# Patient Record
Sex: FEMALE | Race: WHITE | NOT HISPANIC OR LATINO | Employment: OTHER | URBAN - METROPOLITAN AREA
[De-identification: names, ages, dates, MRNs, and addresses within clinical notes are randomized per-mention and may not be internally consistent; named-entity substitution may affect disease eponyms.]

---

## 2017-01-06 DIAGNOSIS — E11.9 TYPE 2 DIABETES MELLITUS WITHOUT COMPLICATION: ICD-10-CM

## 2017-03-08 ENCOUNTER — TELEPHONE (OUTPATIENT)
Dept: INTERNAL MEDICINE | Facility: CLINIC | Age: 82
End: 2017-03-08

## 2017-03-08 NOTE — TELEPHONE ENCOUNTER
----- Message from Swati Joshi sent at 3/8/2017  1:56 PM CST -----  Contact: Ms Bert Lama/   son  Patient is calling for a RX refill of Atorvastatin 80mg called into [facility] pharmacy at [phone]

## 2017-03-29 ENCOUNTER — TELEPHONE (OUTPATIENT)
Dept: INTERNAL MEDICINE | Facility: CLINIC | Age: 82
End: 2017-03-29

## 2017-03-29 RX ORDER — ATORVASTATIN CALCIUM 40 MG/1
40 TABLET, FILM COATED ORAL DAILY
Qty: 90 TABLET | Refills: 3 | Status: SHIPPED | OUTPATIENT
Start: 2017-03-29 | End: 2018-10-11 | Stop reason: SDUPTHER

## 2017-03-29 NOTE — TELEPHONE ENCOUNTER
----- Message from Tonia Austin sent at 3/29/2017 10:34 AM CDT -----  Contact: Bert/ son  [phone]  Pt's son called and is very upset because his mother has been out of Lipitor x 2 weeks and said that he has left three messages and also sent a text to . Pt was told to start getting Lipitor from [facility]'s [address] [phone]. Please take care of this today and notify son when this has been ordered.

## 2017-03-29 NOTE — TELEPHONE ENCOUNTER
Spoke to patients son, Tori. Wants to know why patient was taken off of Lipitor.   If this was a mistake, can we send in a refill to [facility] pharmacy, please advise. Thank you.

## 2017-06-18 RX ORDER — LOSARTAN POTASSIUM 100 MG/1
TABLET ORAL
Qty: 90 TABLET | Refills: 1 | Status: SHIPPED | OUTPATIENT
Start: 2017-06-18 | End: 2017-12-26 | Stop reason: SDUPTHER

## 2017-07-05 RX ORDER — METFORMIN HYDROCHLORIDE 500 MG/1
TABLET, EXTENDED RELEASE ORAL
Qty: 90 TABLET | Refills: 1 | Status: SHIPPED | OUTPATIENT
Start: 2017-07-05 | End: 2018-05-31 | Stop reason: SDUPTHER

## 2017-08-13 ENCOUNTER — TELEPHONE (OUTPATIENT)
Dept: INTERNAL MEDICINE | Facility: CLINIC | Age: 82
End: 2017-08-13

## 2017-08-13 RX ORDER — AMLODIPINE BESYLATE 10 MG/1
TABLET ORAL
Qty: 90 TABLET | Refills: 1 | Status: SHIPPED | OUTPATIENT
Start: 2017-08-13 | End: 2018-10-11 | Stop reason: SDUPTHER

## 2017-08-13 RX ORDER — MIRTAZAPINE 15 MG/1
TABLET, FILM COATED ORAL
Qty: 90 TABLET | Refills: 1 | Status: SHIPPED | OUTPATIENT
Start: 2017-08-13 | End: 2018-06-18 | Stop reason: SDUPTHER

## 2017-08-14 NOTE — TELEPHONE ENCOUNTER
----- Message from Tonia Austin sent at 8/14/2017  2:00 PM CDT -----  Contact: SHARON  Someone called to advise pt she should come in for a visit but she is feeling fine and doesn't want to schedule at this time.

## 2017-12-26 RX ORDER — LOSARTAN POTASSIUM 100 MG/1
TABLET ORAL
Qty: 90 TABLET | Refills: 3 | Status: SHIPPED | OUTPATIENT
Start: 2017-12-26 | End: 2018-10-11 | Stop reason: SDUPTHER

## 2017-12-26 RX ORDER — METOPROLOL SUCCINATE 50 MG/1
TABLET, EXTENDED RELEASE ORAL
Qty: 90 TABLET | Refills: 3 | Status: SHIPPED | OUTPATIENT
Start: 2017-12-26 | End: 2018-10-11 | Stop reason: SDUPTHER

## 2018-02-06 ENCOUNTER — OFFICE VISIT (OUTPATIENT)
Dept: DERMATOLOGY | Facility: CLINIC | Age: 83
End: 2018-02-06
Payer: MEDICARE

## 2018-02-06 VITALS — BODY MASS INDEX: 23.86 KG/M2 | WEIGHT: 139 LBS

## 2018-02-06 DIAGNOSIS — D48.5 NEOPLASM OF UNCERTAIN BEHAVIOR OF SKIN: Primary | ICD-10-CM

## 2018-02-06 DIAGNOSIS — Z85.828 HISTORY OF SKIN CANCER: ICD-10-CM

## 2018-02-06 DIAGNOSIS — L57.0 ACTINIC KERATOSIS: ICD-10-CM

## 2018-02-06 PROCEDURE — 99213 OFFICE O/P EST LOW 20 MIN: CPT | Mod: 25,S$PBB,, | Performed by: DERMATOLOGY

## 2018-02-06 PROCEDURE — 1159F MED LIST DOCD IN RCRD: CPT | Mod: ,,, | Performed by: DERMATOLOGY

## 2018-02-06 PROCEDURE — 17000 DESTRUCT PREMALG LESION: CPT | Mod: PBBFAC,PO | Performed by: DERMATOLOGY

## 2018-02-06 PROCEDURE — 88305 TISSUE EXAM BY PATHOLOGIST: CPT | Mod: 26,,, | Performed by: PATHOLOGY

## 2018-02-06 PROCEDURE — 88305 TISSUE EXAM BY PATHOLOGIST: CPT | Performed by: PATHOLOGY

## 2018-02-06 PROCEDURE — 17000 DESTRUCT PREMALG LESION: CPT | Mod: S$PBB,,, | Performed by: DERMATOLOGY

## 2018-02-06 PROCEDURE — 11100 PR BIOPSY OF SKIN LESION: CPT | Mod: 59,S$PBB,, | Performed by: DERMATOLOGY

## 2018-02-06 PROCEDURE — 99213 OFFICE O/P EST LOW 20 MIN: CPT | Mod: PBBFAC,PO | Performed by: DERMATOLOGY

## 2018-02-06 PROCEDURE — 11100 PR BIOPSY OF SKIN LESION: CPT | Mod: 59,PBBFAC,PO | Performed by: DERMATOLOGY

## 2018-02-06 PROCEDURE — 99999 PR PBB SHADOW E&M-EST. PATIENT-LVL III: CPT | Mod: PBBFAC,,, | Performed by: DERMATOLOGY

## 2018-02-06 PROCEDURE — 1125F AMNT PAIN NOTED PAIN PRSNT: CPT | Mod: ,,, | Performed by: DERMATOLOGY

## 2018-02-06 NOTE — PROGRESS NOTES
Subjective:       Patient ID:  Audrey Lama is a 89 y.o. female who presents for   Chief Complaint   Patient presents with    Lesion     right hand     History of Present Illness: The patient presents with chief complaint of spot.  Location: right hand  Duration: several years  Signs/Symptoms: painful, uses cane in her right hand and is sore    Prior treatments: none    See previous note 2016           Review of Systems     Objective:    Physical Exam   Constitutional: She appears well-developed and well-nourished. No distress.   Neurological: She is alert and oriented to person, place, and time. She is not disoriented.   Psychiatric: She has a normal mood and affect.   Skin:   Areas Examined (abnormalities noted in diagram):   Head / Face Inspection Performed  Neck Inspection Performed  Chest / Axilla Inspection Performed  RUE Inspected  LUE Inspection Performed  Nails and Digits Inspection Performed                   Diagram Legend     Erythematous scaling macule/papule c/w actinic keratosis       Vascular papule c/w angioma      Pigmented verrucoid papule/plaque c/w seborrheic keratosis      Yellow umbilicated papule c/w sebaceous hyperplasia      Irregularly shaped tan macule c/w lentigo     1-2 mm smooth white papules consistent with Milia      Movable subcutaneous cyst with punctum c/w epidermal inclusion cyst      Subcutaneous movable cyst c/w pilar cyst      Firm pink to brown papule c/w dermatofibroma      Pedunculated fleshy papule(s) c/w skin tag(s)      Evenly pigmented macule c/w junctional nevus     Mildly variegated pigmented, slightly irregular-bordered macule c/w mildly atypical nevus      Flesh colored to evenly pigmented papule c/w intradermal nevus       Pink pearly papule/plaque c/w basal cell carcinoma      Erythematous hyperkeratotic cursted plaque c/w SCC      Surgical scar with no sign of skin cancer recurrence      Open and closed comedones      Inflammatory papules and pustules       Verrucoid papule consistent consistent with wart     Erythematous eczematous patches and plaques     Dystrophic onycholytic nail with subungual debris c/w onychomycosis     Umbilicated papule    Erythematous-base heme-crusted tan verrucoid plaque consistent with inflamed seborrheic keratosis     Erythematous Silvery Scaling Plaque c/w Psoriasis     See annotation      Assessment / Plan:      Pathology Orders:     Normal Orders This Visit    Tissue Specimen To Pathology, Dermatology     Questions:    Directional Terms:  Other(comment)    Clinical information:  r/o scc    Specific Site:  right hand        Neoplasm of uncertain behavior of skin  -     Tissue Specimen To Pathology, Dermatology  Shave biopsy performed after verbal consent including risk of infection, scar, recurrence, need for additional treatment of site. Area prepped with alcohol, anesthetized with approximately 1.0cc of 1% lidocaine with epinephrine. . Hemostasis achieved with  hyfrecation. No complications. Dressing applied. Wound care explained. Will need further rx if + ca  See photo on chart 8/2016:      Actinic keratosi     s Cryosurgery Procedure Note    Verbal consent from the patient is obtained and the patient is aware of the precancerous quality and need for treatment of these lesions. Liquid nitrogen cryosurgery is applied to the 1 actinic keratoses, as detailed in the physical exam, to produce a freeze injury.      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

## 2018-04-11 ENCOUNTER — PROCEDURE VISIT (OUTPATIENT)
Dept: DERMATOLOGY | Facility: CLINIC | Age: 83
End: 2018-04-11
Payer: MEDICARE

## 2018-04-11 VITALS
HEART RATE: 59 BPM | DIASTOLIC BLOOD PRESSURE: 56 MMHG | HEIGHT: 64 IN | SYSTOLIC BLOOD PRESSURE: 152 MMHG | WEIGHT: 140 LBS | BODY MASS INDEX: 23.9 KG/M2

## 2018-04-11 DIAGNOSIS — C44.622 SQUAMOUS CELL CARCINOMA OF SKIN OF RIGHT HAND AND FINGERS: Primary | ICD-10-CM

## 2018-04-11 PROCEDURE — 17312 MOHS ADDL STAGE: CPT | Mod: PBBFAC | Performed by: DERMATOLOGY

## 2018-04-11 PROCEDURE — 17311 MOHS 1 STAGE H/N/HF/G: CPT | Mod: PBBFAC | Performed by: DERMATOLOGY

## 2018-04-11 PROCEDURE — 17312 MOHS ADDL STAGE: CPT | Mod: S$PBB,,, | Performed by: DERMATOLOGY

## 2018-04-11 PROCEDURE — 17311 MOHS 1 STAGE H/N/HF/G: CPT | Mod: S$PBB,,, | Performed by: DERMATOLOGY

## 2018-04-11 PROCEDURE — 13132 CMPLX RPR F/C/C/M/N/AX/G/H/F: CPT | Mod: PBBFAC | Performed by: DERMATOLOGY

## 2018-04-11 PROCEDURE — 99499 UNLISTED E&M SERVICE: CPT | Mod: S$PBB,,, | Performed by: DERMATOLOGY

## 2018-04-11 PROCEDURE — 13132 CMPLX RPR F/C/C/M/N/AX/G/H/F: CPT | Mod: 51,S$PBB,, | Performed by: DERMATOLOGY

## 2018-04-11 RX ORDER — OXYCODONE AND ACETAMINOPHEN 5; 325 MG/1; MG/1
1 TABLET ORAL
Qty: 20 TABLET | Refills: 0 | Status: SHIPPED | OUTPATIENT
Start: 2018-04-11 | End: 2018-04-30

## 2018-04-11 RX ORDER — CEPHALEXIN 500 MG/1
500 CAPSULE ORAL 3 TIMES DAILY
Qty: 30 CAPSULE | Refills: 0 | Status: SHIPPED | OUTPATIENT
Start: 2018-04-11 | End: 2018-04-21

## 2018-04-11 NOTE — PROGRESS NOTES
PROCEDURE: Mohs' Micrographic Surgery    INDICATION: Biopsy-proven skin cancer of cosmetically and functionally important areas, including head, neck, genital, hand, foot, or areas known for having difficulty in healing, such as the lower anterior legs. Tumors with aggressive clinical behavior (rapidly growing, greater than 1 cm in diameter). Tumor with ill-defined borders.    REFERRING MD: Imelda Schmidt M.D.    CASE NUMBER:     ANESTHETIC: 8 cc 0.5% Lidocaine with Epi 1:200,000 mixed 1:1 with 0.5% Bupivacaine    SURGICAL PREP: Hibiclens    SURGEON: Otis Loaiza MD    ASSISTANTS: Kierra Teran PA-C, Edilia Jain MA and Hafsa Babin, Surg Tech    PREOPERATIVE DIAGNOSIS: squamous cell carcinoma    POSTOPERATIVE DIAGNOSIS: squamous cell carcinoma    PATHOLOGIC DIAGNOSIS: squamous cell carcinoma- invasive, infiltrating, moderately differentiated; SCCIS    HISTOLOGY OF SPECIMENS IN FIRST STAGE:   Tumor Type: Tumor seen. Squamous cell carcinoma in situ: Epidermis with full-thickness atypia and variable epidermal maturation.  Invasive squamous cell carcinoma: Proliferation of squamous cells exhibiting atypia and infiltrating within the dermis.  Moderately-differentiated squamous cell carcinoma: Proliferation of squamous cells exhibiting atypia of moderate differentiation and infiltrating within the dermis.   Depth of Invasion: epidermis and dermis  Perineural Invasion: No    HISTOLOGY OF SPECIMENS IN SUBSEQUENT STAGES:  Tumor Type: Tumor seen. Same as in first stage.   Depth of Invasion: epidermis and dermis  Perineural Invasion: No    STAGES OF MOHS' SURGERY PERFORMED: 3    TUMOR-FREE PLANE ACHIEVED: Yes    HEMOSTASIS: electrocoagulation     SPECIMENS: 7 (5 in stage A, 1 in stage B and 1 in stage C)    LOCATION: right hand. Patient verified location. (Web space between thumb and index finger)    INITIAL LESION SIZE: 1.5 x 1.7 cm    FINAL DEFECT SIZE: 2.0 x 3.6 cm    WOUND REPAIR/DISPOSITION: The patient tolerated  "Mohs' Micrographic Surgery for a squamous cell carcinoma very well. When the tumor was completely removed, a repair of the surgical defect was undertaken.      PROCEDURE: Complex Linear Repair    INDICATION: Status post Mohs' Micrographic Surgery for squamous cell carcinoma.    CASE NUMBER:     SURGEON: Otis Loaiza MD    ASSISTANTS: Kierra Teran PA-C and Hafsa Babin Surg Tech    ANESTHETIC: 3 cc 1% Lidocaine with Epinephrine 1:100,000    SURGICAL PREP: Hibiclens    LOCATION: right hand    DEFECT SIZE: 2.0 x 3.6 cm    WOUND REPAIR/DISPOSITION:  After the patient's carcinoma had been completely removed with Mohs' Micrographic Surgery, a repair of the surgical defect was undertaken. The patient was returned to the operating suite where the area of right hand was prepped, draped, and anesthetized in the usual sterile fashion. The wound was widely undermined in all directions. Then, electrocoagulation was used to obtain meticulous hemostasis. 4-0 Vicryl buried vertical mattress sutures were placed into the subcutaneous and dermal plane to close the wound and armaan the cutaneous wound edge. Bilateral dog ears were identified and were removed by a standard Burow's triangle technique. The cutaneous wound edges were closed using interrupted 4-0 Prolene suture.    The patient tolerated the procedure well.    The area was cleaned and dressed appropriately and the patient was given wound care instructions, as well as appointment for follow-up evaluation. Patient was placed on Percocet 5 prn postop pain and Keflex 500 mg TID x 10 days.    LENGTH OF REPAIR: 5.5 cm    Vitals:    04/11/18 0729 04/11/18 1155   BP: 129/67 (!) 152/56   BP Location: Left arm    Patient Position: Sitting    BP Method: Medium (Automatic)    Pulse: 68 (!) 59   Weight: 63.5 kg (140 lb)    Height: 5' 4" (1.626 m)          "

## 2018-04-24 ENCOUNTER — TELEPHONE (OUTPATIENT)
Dept: DERMATOLOGY | Facility: CLINIC | Age: 83
End: 2018-04-24

## 2018-04-24 NOTE — TELEPHONE ENCOUNTER
Called pt who had surgery on R hand for SCC on 4/11 with CLC, pt's daughter called stating the area looks white with a foul odor. I called the number she left on my answering machine and I spoke to the pt who denied any pain or swelling in the area. I am assuming because the area looks white/grey with odor it is getting too moist. I advised the patient to continue wound care but leave the bandage off to let the area dry somewhat and to call if she has any issues. She expressed verbal understanding.

## 2018-04-25 ENCOUNTER — TELEPHONE (OUTPATIENT)
Dept: DERMATOLOGY | Facility: CLINIC | Age: 83
End: 2018-04-25

## 2018-04-25 NOTE — TELEPHONE ENCOUNTER
Spoke with Melina and she stated she removed the bandage and it does not look like it is infected. As I stated before when I spoke to Ms Ventura the patient to remove the bandage, clean with soap and water and keep moist with a thin layer of Aquaphor or vaseline. She expressed verbal understanding.

## 2018-04-25 NOTE — TELEPHONE ENCOUNTER
----- Message from Lisa Fuentes sent at 4/25/2018  8:21 AM CDT -----  Contact:    [phone]-Melina-please call patient friend said patient can not talk area from procedure stitches not looking good please call number in message need to speak with the nurse

## 2018-04-30 ENCOUNTER — OFFICE VISIT (OUTPATIENT)
Dept: DERMATOLOGY | Facility: CLINIC | Age: 83
End: 2018-04-30
Payer: MEDICARE

## 2018-04-30 DIAGNOSIS — Z09 POSTOP CHECK: Primary | ICD-10-CM

## 2018-04-30 PROCEDURE — 99213 OFFICE O/P EST LOW 20 MIN: CPT | Mod: PBBFAC | Performed by: DERMATOLOGY

## 2018-04-30 PROCEDURE — 99999 PR PBB SHADOW E&M-EST. PATIENT-LVL III: CPT | Mod: PBBFAC,,, | Performed by: DERMATOLOGY

## 2018-04-30 PROCEDURE — 99024 POSTOP FOLLOW-UP VISIT: CPT | Mod: POP,,, | Performed by: DERMATOLOGY

## 2018-04-30 NOTE — PROGRESS NOTES
89 y.o. female patient is here for suture removal following Mohs' surgery.    Patient reports no problems with right hand and fingers.    WOUND PE:  The right hand and fingers sutures intact. Wound healing well. Good skin edges. No signs or symptoms of infection.    IMPRESSION:  Healing operative site from Mohs' surgery SCC, right hand and fingers s/p Mohs with CLC, postop day # 19..    PLAN:  Sutures removed today. Steri-strips applied.  Continue wound care.  Keep moist with Aquaphor.    RTC:  In 3-6 months with Imelda Schmidt M.D. for skin check or sooner if new concern arises.

## 2018-05-31 RX ORDER — METFORMIN HYDROCHLORIDE 500 MG/1
500 TABLET, EXTENDED RELEASE ORAL NIGHTLY
Qty: 90 TABLET | Refills: 3 | Status: SHIPPED | OUTPATIENT
Start: 2018-05-31 | End: 2018-10-11 | Stop reason: SDUPTHER

## 2018-05-31 NOTE — TELEPHONE ENCOUNTER
----- Message from Kimberly Tai sent at 5/31/2018 12:24 PM CDT -----  Contact: Son/Bert [phone]  Is this a refill or new RX:  Refill    RX name and strength: metformin (GLUCOPHAGE-XR) 500 MG 24 hr tablet     Pharmacy name and phone # [facility] - [address] [phone] (Phone) [phone] (Fax)

## 2018-06-05 ENCOUNTER — NURSE TRIAGE (OUTPATIENT)
Dept: ADMINISTRATIVE | Facility: CLINIC | Age: 83
End: 2018-06-05

## 2018-06-06 NOTE — TELEPHONE ENCOUNTER
"  Reason for Disposition   [1] Shingles rash (matches SYMPTOMS) AND [2] weak immune system (e.g., HIV positive,  cancer chemotherapy, chronic steroid treatment, splenectomy) AND [3] NOT taking antiviral medication    Answer Assessment - Initial Assessment Questions  1. APPEARANCE of RASH: "Describe the rash."       Pinkish color with clear blisters, she has been scratching  2. LOCATION: "Where is the rash located?"       Middle of the lower back  3. ONSET: "When did the rash start?"       today  4. ITCHING: "Does the rash itch?" If so, ask: "How bad is the itch?"  (Scale 1-10; or mild, moderate, severe)      no  5. PAIN: "Does the rash hurt?" If so, ask: "How bad is the pain?"  (Scale 1-10; or mild, moderate, severe)      Burns, 5/10  6. OTHER SYMPTOMS: "Do you have any other symptoms?" (e.g., fever)      no  7. PREGNANCY: "Is there any chance you are pregnant?" "When was your last menstrual period?"      na    Protocols used: [facility]INGLES-MARTHA    "

## 2018-06-18 RX ORDER — MIRTAZAPINE 15 MG/1
TABLET, FILM COATED ORAL
Qty: 90 TABLET | Refills: 1 | Status: SHIPPED | OUTPATIENT
Start: 2018-06-18 | End: 2018-10-11 | Stop reason: SDUPTHER

## 2018-09-27 ENCOUNTER — DOCUMENTATION ONLY (OUTPATIENT)
Dept: AUDIOLOGY | Facility: CLINIC | Age: 83
End: 2018-09-27

## 2018-09-27 NOTE — PROGRESS NOTES
[employer]  Purchase date: 11/12/15     [id number]  : M2L  L/D Exp 12/12/16  Repair Exp 12/12/17

## 2018-10-04 ENCOUNTER — HOSPITAL ENCOUNTER (EMERGENCY)
Facility: HOSPITAL | Age: 83
Discharge: HOME | End: 2018-10-04
Attending: EMERGENCY MEDICINE
Payer: MEDICARE

## 2018-10-04 VITALS
DIASTOLIC BLOOD PRESSURE: 81 MMHG | TEMPERATURE: 98 F | WEIGHT: 140 LBS | SYSTOLIC BLOOD PRESSURE: 127 MMHG | HEIGHT: 64 IN | OXYGEN SATURATION: 100 % | HEART RATE: 71 BPM | RESPIRATION RATE: 16 BRPM | BODY MASS INDEX: 23.9 KG/M2

## 2018-10-04 DIAGNOSIS — S61.412A LACERATION OF LEFT HAND WITHOUT COMPLICATION, EXCLUDING FINGERS, INITIAL ENCOUNTER: ICD-10-CM

## 2018-10-04 DIAGNOSIS — M79.639 FOREARM PAIN: ICD-10-CM
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
## 2018-10-04 DIAGNOSIS — M25.539 WRIST PAIN: ICD-10-CM

## 2018-10-04 DIAGNOSIS — S50.819A FOREARM ABRASION: ICD-10-CM

## 2018-10-04 DIAGNOSIS — S52.501A CLOSED FRACTURE OF DISTAL END OF RIGHT RADIUS, UNSPECIFIED FRACTURE MORPHOLOGY, INITIAL ENCOUNTER: ICD-10-CM

## 2018-10-04 PROCEDURE — 12001 RPR S/N/AX/GEN/TRNK 2.5CM/<: CPT

## 2018-10-04 PROCEDURE — 99284 EMERGENCY DEPT VISIT MOD MDM: CPT | Mod: 25

## 2018-10-04 PROCEDURE — 25000003 PHARM REV CODE 250: Performed by: NURSE PRACTITIONER

## 2018-10-04 RX ORDER — CEPHALEXIN 500 MG/1
500 CAPSULE ORAL 4 TIMES DAILY
Qty: 28 CAPSULE | Refills: 0 | Status: SHIPPED | OUTPATIENT
Start: 2018-10-04 | End: 2018-10-11

## 2018-10-04 RX ORDER — HYDROCODONE BITARTRATE AND ACETAMINOPHEN 5; 325 MG/1; MG/1
1 TABLET ORAL
Status: COMPLETED | OUTPATIENT
Start: 2018-10-04 | End: 2018-10-04

## 2018-10-04 RX ORDER — LIDOCAINE HYDROCHLORIDE 10 MG/ML
10 INJECTION INFILTRATION; PERINEURAL
Status: COMPLETED | OUTPATIENT
Start: 2018-10-04 | End: 2018-10-04

## 2018-10-04 RX ORDER — TRAMADOL HYDROCHLORIDE 50 MG/1
50 TABLET ORAL EVERY 6 HOURS PRN
Qty: 12 TABLET | Refills: 0 | Status: SHIPPED | OUTPATIENT
Start: 2018-10-04 | End: 2018-10-14

## 2018-10-04 RX ADMIN — LIDOCAINE HYDROCHLORIDE 10 ML: 10 INJECTION, SOLUTION INFILTRATION; PERINEURAL at 03:10

## 2018-10-04 RX ADMIN — HYDROCODONE BITARTRATE AND ACETAMINOPHEN 1 TABLET: 5; 325 TABLET ORAL at 01:10

## 2018-10-04 NOTE — ED NOTES
"Patient presents to ED after falling from the front steps of her home. Daughter at bedside. Patient is A/O, answers questions appropriately. Patient states that she "isn't sure if she hit her head or not." Denies LOC. Skin abrasions/tears noted to posterior left hand and posterior left forearm. Small amounts of blood noted to be coming from wounds, bleeding controlled. Bruising and small skin tear noted to right elbow- no active bleeding noted. Small skin tear noted to frontal part of head- no active bleeding noted. Patient states that her grandson and grandson's girlfriend live at home with her full time but were not at her residence at time of fall. Patient is ambulatory with rolling walker at home. Patient denies any pain or discomfort at this time. No other apparent acute distress noted at this time. Will continue to monitor patient.   "

## 2018-10-04 NOTE — DISCHARGE INSTRUCTIONS
Please f/u with ortho in one week. Keep splint on until you follow-up with ortho. Return to ED or follow up with PCP in 7-10 days for suture removal.  Keep area dry.  Return to ED sooner if symptoms worsen or change, such as any fever, redness, swelling, warmth, or drainage from wound.

## 2018-10-04 NOTE — ED PROVIDER NOTES
"Encounter Date: 10/4/2018       History     Chief Complaint   Patient presents with    Fall     Fell at home off porch, Laceration to L hand, tear to L forearm, tear and bruising to R forearm.      Patient is a 89-year-old female past medical history of diabetes, hypertension, macular degeneration, and squamous cell carcinoma who presents to ED s/p unwitnessed fall earlier today.  Patient reports that she fell from the front steps of her home and is not sure "if I hit my head or not."  Takes daily aspirin.  Patient complain of pain and abrasions/skin tears to bilateral forearms and left hand.  Bleeding controlled.  Patient up-to-date on tetanus.  Patient reports that she normally gets around with a rolling walker at home.  Daughter reports that patient also fell about 3 weeks ago, injured right wrist but did not want to go to the hospital get it looked at.  Patient denies any alleviating or exacerbating factors.  Denies fever, chills, neck pain/stiffness, dizziness, SOB, chest pain, nausea, vomiting, diarrhea, and abdominal pain. Denies any other complaints at this time.      The history is provided by the patient and a relative.     Review of patient's allergies indicates:   Allergen Reactions    Celexa [citalopram]      Past Medical History:   Diagnosis Date    Diabetes mellitus     Hypertension     Macular degeneration     Squamous cell carcinoma      Past Surgical History:   Procedure Laterality Date    CATARACT EXTRACTION       Family History   Problem Relation Age of Onset    Amblyopia Sister     Hypertension Sister     Cancer Son     Heart attack Son     Blindness Neg Hx     Cataracts Neg Hx     Diabetes Neg Hx     Glaucoma Neg Hx     Macular degeneration Neg Hx     Retinal detachment Neg Hx     Strabismus Neg Hx     Stroke Neg Hx     Thyroid disease Neg Hx      Social History     Tobacco Use    Smoking status: Never Smoker    Smokeless tobacco: Never Used   Substance Use Topics    " Alcohol use: Yes    Drug use: Not on file     Review of Systems   Constitutional: Negative for fever.   Respiratory: Negative for shortness of breath.    Cardiovascular: Negative for chest pain.   Gastrointestinal: Negative for abdominal pain, diarrhea, nausea and vomiting.   Genitourinary: Negative for dysuria.   Musculoskeletal: Positive for arthralgias (left forearm, left hand, and right forearm). Negative for back pain, gait problem, neck pain and neck stiffness.   Skin: Positive for wound (skin tears/abrasions to left hand, left forearm, and right forearm ).   Neurological: Negative for dizziness, syncope, facial asymmetry, speech difficulty, weakness, light-headedness, numbness and headaches.   Hematological: Does not bruise/bleed easily.   Psychiatric/Behavioral: The patient is nervous/anxious.    All other systems reviewed and are negative.      Physical Exam     Initial Vitals [10/04/18 1124]   BP Pulse Resp Temp SpO2   133/62 93 16 98.4 °F (36.9 °C) 96 %      MAP       --         Physical Exam    Vitals reviewed.  Constitutional: She appears well-developed and well-nourished. She is not diaphoretic. She is cooperative.  Non-toxic appearance. She does not have a sickly appearance.   Ambulates with rolling walker at home.   HENT:   Head: Atraumatic.   No signs of basilar fracture.            Neck: Normal range of motion and full passive range of motion without pain. Neck supple.   Cardiovascular: Regular rhythm.   No swelling or tender calves noted.  Negative Hair's sign.     Pulmonary/Chest: No respiratory distress.   Abdominal: Soft. Bowel sounds are normal.   Musculoskeletal:   Non-tender midline CTL.  Pelvis stable.  TTP to right wrist with moderate swelling.  No redness or drainage.   Sensory-motor equal bilaterally.  Radial pulses equal bilaterally.             Neurological: She is alert and oriented to person, place, and time. She has normal strength. No sensory deficit. GCS eye subscore is 4. GCS  verbal subscore is 5. GCS motor subscore is 6.   At neurological baseline per daughter at bedside.    Skin: Skin is warm and dry. Abrasion and laceration noted.   Skin tears noted to left and right forearms and to frontal aspect of head with ecchymosis noted to right forearm.   Jagged laceration noted to left hand.   Bleeding controlled.     Psychiatric: Her mood appears anxious.         ED Course   Lac Repair  Date/Time: 10/4/2018 9:15 PM  Performed by: Chevy Valdez NP  Authorized by: Kemal Golden MD   Body area: upper extremity  Location details: left hand  Laceration length: 2 cm  Foreign bodies: no foreign bodies  Tendon involvement: none  Nerve involvement: none    Anesthesia:  Local Anesthetic: lidocaine 1% without epinephrine  Irrigation solution: saline  Amount of cleaning: standard  Debridement: none  Degree of undermining: none  Skin closure: 5-0 Prolene  Number of sutures: 7  Technique: simple  Approximation: close  Approximation difficulty: simple  Dressing: dressing applied  Patient tolerance: Patient tolerated the procedure well with no immediate complications        Labs Reviewed - No data to display       Imaging Results          X-Ray Wrist Complete Right (Final result)  Result time 10/04/18 14:36:35    Final result by Lloyd Ferrer Jr., MD (10/04/18 14:36:35)                 Impression:      Impacted comminuted fracture of the distal radius extending into the joint space.  There does appear to be some DISI of the lunate.      Electronically signed by: Lloyd Ferrer MD  Date:    10/04/2018  Time:    14:36             Narrative:    EXAMINATION:  XR WRIST COMPLETE 3 VIEWS RIGHT    CLINICAL HISTORY:  Pain in unspecified wrist    TECHNIQUE:  PA, lateral, and oblique views of the right wrist were performed.    COMPARISON:  None    FINDINGS:  Bones are significantly demineralized.  There is an impacted comminuted fracture of the distal radius extending into the radiocarpal joint.  Mild  DISI of the lunate.  Soft tissue swelling noted.  Degenerative change at the 1st carpometacarpal joint.                               CT Cervical Spine Without Contrast (Final result)  Result time 10/04/18 13:46:27    Final result by Vikash Wynn MD (10/04/18 13:46:27)                 Impression:      1. Degenerative changes throughout the cervical spine noting limited evaluation given motion artifact.  Vacuum disc phenomenon at C6-C7 is suspected to be on the basis of degenerative change noting no adjacent edema.  No convincing acute displaced fracture or dislocation, please see above for details.  2. Hypoattenuating left thyroid lesion, nonspecific, ultrasound could be performed if not previously performed as warranted.  3. Additional findings above.      Electronically signed by: Vikash Wynn MD  Date:    10/04/2018  Time:    13:46             Narrative:    EXAMINATION:  CT CERVICAL SPINE WITHOUT CONTRAST    CLINICAL HISTORY:  Polytrauma, critical, head/C-spine inj suspected;    TECHNIQUE:  Low dose axial images, sagittal and coronal reformations were performed though the cervical spine.  Contrast was not administered.    COMPARISON:  None    FINDINGS:  Examination is limited secondary to motion artifact.    The visualized lung apices are grossly clear.  There is a hypoattenuating lesion within the left thyroid measuring 2 cm, this could be further evaluated with ultrasound as warranted.  There are several additional subcentimeter low attenuating lesions scattered throughout the thyroid.  The soft tissues of the neck are otherwise grossly unremarkable.  No significant paraspinous or hypopharyngeal soft tissue abnormality.    Lateral imaging demonstrates grossly adequate alignment of the cervical spine.  There is osseous fusion of C2 and C3, with partial fusion of C1 with the skullbase.  There is multilevel severe disc space height loss, primarily involving C5-C6 and C6-C7.  There is vacuum disc  phenomenon at C6-C7.  There are multiple anterior bridging osteophytes involving the proximal thoracic spine as well as C4, C5, and C6.  No definite osteophyte fracture.  Gas within the disc space of C6-C7 is suspected to be on the basis of degenerative change noting no adjacent edema.  The facet joints are aligned.  There is multilevel facet arthropathy.  There is multilevel moderate to severe neuroforaminal narrowing and spinal canal stenosis.                               CT Head Without Contrast (Final result)  Result time 10/04/18 13:39:33    Final result by Vikash Wynn MD (10/04/18 13:39:33)                 Impression:      1. No acute intracranial abnormalities.  2. Sinus disease.  3. Stable sequela of chronic microvascular ischemic change and senescent change.      Electronically signed by: Vikash Wynn MD  Date:    10/04/2018  Time:    13:39             Narrative:    EXAMINATION:  CT HEAD WITHOUT CONTRAST    CLINICAL HISTORY:  Ataxia, after head trauma (<24 hours);    TECHNIQUE:  Low dose axial images were obtained through the head.  Coronal and sagittal reformations were also performed. Contrast was not administered.    COMPARISON:  04/25/2015    FINDINGS:  There is generalized cerebral volume loss.  There is hypoattenuation in a periventricular fashion, likely sequela of chronic microvascular ischemic change.  There is no evidence of acute major vascular territory infarct, hemorrhage, or mass.  There is no hydrocephalus.  There are no abnormal extra-axial fluid collections.  There is mild mucous membrane thickening of the left maxillary sinus, and mucous retention cysts or polyps within the left sphenoid sinus, otherwise the visualized paranasal sinuses and mastoid air cells are clear, and there is no evidence of calvarial fracture.  The visualized soft tissues are unremarkable.                               X-Ray Hand 3 View Left (Final result)  Result time 10/04/18 13:29:30    Final result by  Vikash Wynn MD (10/04/18 13:29:30)                 Impression:      1. No convincing acute displaced fracture or dislocation of the hand, noting limitation related to osteopenia and degenerative change.      Electronically signed by: Vikash Wynn MD  Date:    10/04/2018  Time:    13:29             Narrative:    EXAMINATION:  XR HAND COMPLETE 3 VIEW LEFT    CLINICAL HISTORY:  hand pain;.    TECHNIQUE:  PA, lateral, and oblique views of the left hand were performed.    COMPARISON:  None    FINDINGS:  Three views.    There is osteopenia.  Degenerative changes are noted of the PIP and DIP joints.  No convincing acute displaced fracture or dislocation of the hand.  No significant edema.                               X-Ray Forearm Left (Final result)  Result time 10/04/18 13:30:41    Final result by Vikash Wynn MD (10/04/18 13:30:41)                 Impression:      1. No acute displaced fracture or dislocation of the forearm noting possible soft tissue injury involving the mid to distal forearm.      Electronically signed by: Vikash Wynn MD  Date:    10/04/2018  Time:    13:30             Narrative:    EXAMINATION:  XR FOREARM LEFT    CLINICAL HISTORY:  Abrasion of unspecified forearm, initial encounter    TECHNIQUE:  AP and lateral views of the left forearm were performed.    COMPARISON:  None    FINDINGS:  Two views.    Degenerative changes are noted of the wrist and elbow.  There is osteopenia.  No convincing acute displaced fracture or dislocation of the forearm.  No radiopaque foreign body.  There may be soft tissue injury involving the proximal anterior aspect of the forearm.                               X-Ray Forearm Right (Final result)  Result time 10/04/18 13:31:48    Final result by Vikash Wynn MD (10/04/18 13:31:48)                 Impression:      1. Irregularity of the distal radius, noting there is some edema in the region, dedicated wrist radiography is recommended for further  evaluation.      Electronically signed by: Vikash Wynn MD  Date:    10/04/2018  Time:    13:31             Narrative:    EXAMINATION:  XR FOREARM RIGHT    CLINICAL HISTORY:  Pain in unspecified forearm    TECHNIQUE:  AP and lateral views of the right forearm were performed.    COMPARISON:  None    FINDINGS:  Two views.    The elbow appears intact.  Degenerative changes are noted of the wrist.  There is osteopenia.  There is some cortical irregularity involving the distal radius, fracture not excluded, consider dedicated right wrist radiography.  No radiopaque foreign body.                                 Medical Decision Making:   History:   I obtained history from: someone other than patient.       <> Summary of History: Daughter   Initial Assessment:   Patient presents to ED s/p unwitnessed fall earlier today.  Appears anxious and nontoxic.  Afebrile.  At neurological baseline per daughter at bedside. Nontender midline CTL. Pelvis stable.    Differential Diagnosis:   Sprain, strain, fracture, intracranial bleed  Clinical Tests:   Radiological Study: Ordered and Reviewed  ED Management:  CT head and neck, Xrays, PO norco, sutures, velcro splint  Other:   I have discussed this case with another health care provider.       <> Summary of the Discussion: 1450- Called Dr. Lopez, ortho for consult and he said to that the fracture looked old and is trying to heal and to place patient velcro splint and have her f/u with him or hand surgeon of choice within one week.                7 sutures placed, see notes. CT head and neck normal. Xray wrist reveals impacted comminuted fracture of the distal radius extending into the joint space.  There does appear to be some DISI of the lunate. Dr. Lopez, orthopedic consulted. No signs of CVA.  Patient is stable will be DC home.  Patient placed in Velcro splint and instructed to follow up with Ortho within the week.  Patient instructed to keep splint on until she does follow-up  with Ortho.  Patient instructed to return here or to go to PCP in 7-10 days for suture removal and to keep area dry.  Patient instructed to return to ED for any concerns, such as fever, warmth, redness, swelling, or drainage from wound.  Patient and daughter verbalize DC instructions and are in compliance and agreement with treatment plan.                      Clinical Impression:   The primary encounter diagnosis was Fall, initial encounter. Diagnoses of Forearm pain, Forearm abrasion, Wrist pain, Closed fracture of distal end of right radius, unspecified fracture morphology, initial encounter, and Laceration of left hand without complication, excluding fingers, initial encounter were also pertinent to this visit.                             Chevy Valdez, JADEN  10/04/18 5319

## 2018-10-08 ENCOUNTER — PATIENT OUTREACH (OUTPATIENT)
Dept: ADMINISTRATIVE | Facility: HOSPITAL | Age: 83
End: 2018-10-08

## 2018-10-08 DIAGNOSIS — E11.69 CONTROLLED TYPE 2 DIABETES MELLITUS WITH OTHER SPECIFIED COMPLICATION, UNSPECIFIED WHETHER LONG TERM INSULIN USE: Primary | ICD-10-CM

## 2018-10-08 DIAGNOSIS — E11.8 CONTROLLED DIABETES MELLITUS TYPE 2 WITH COMPLICATIONS, UNSPECIFIED WHETHER LONG TERM INSULIN USE: ICD-10-CM

## 2018-10-08 NOTE — PROGRESS NOTES
VM left for son as reminder of pt upcoming PCP appt 10-11-18 and inst to call our office to schedule her Diabetic Eye Photo Exam after her PCP visit. Unable to discuss vaccines.

## 2018-10-11 ENCOUNTER — TELEPHONE (OUTPATIENT)
Dept: INTERNAL MEDICINE | Facility: CLINIC | Age: 83
End: 2018-10-11

## 2018-10-11 ENCOUNTER — OFFICE VISIT (OUTPATIENT)
Dept: INTERNAL MEDICINE | Facility: CLINIC | Age: 83
End: 2018-10-11
Payer: MEDICARE

## 2018-10-11 ENCOUNTER — IMMUNIZATION (OUTPATIENT)
Dept: INTERNAL MEDICINE | Facility: CLINIC | Age: 83
End: 2018-10-11
Payer: MEDICARE

## 2018-10-11 ENCOUNTER — LAB VISIT (OUTPATIENT)
Dept: LAB | Facility: HOSPITAL | Age: 83
End: 2018-10-11
Attending: INTERNAL MEDICINE
Payer: MEDICARE

## 2018-10-11 VITALS
SYSTOLIC BLOOD PRESSURE: 144 MMHG | HEART RATE: 59 BPM | HEIGHT: 64 IN | DIASTOLIC BLOOD PRESSURE: 58 MMHG | WEIGHT: 157.19 LBS | OXYGEN SATURATION: 97 % | BODY MASS INDEX: 26.84 KG/M2

## 2018-10-11 DIAGNOSIS — E78.00 PURE HYPERCHOLESTEROLEMIA: ICD-10-CM

## 2018-10-11 DIAGNOSIS — I10 ESSENTIAL HYPERTENSION: ICD-10-CM

## 2018-10-11 DIAGNOSIS — E11.22 CONTROLLED TYPE 2 DIABETES MELLITUS WITH STAGE 2 CHRONIC KIDNEY DISEASE, WITHOUT LONG-TERM CURRENT USE OF INSULIN: ICD-10-CM

## 2018-10-11 DIAGNOSIS — E11.22 CONTROLLED TYPE 2 DIABETES MELLITUS WITH STAGE 2 CHRONIC KIDNEY DISEASE, WITHOUT LONG-TERM CURRENT USE OF INSULIN: Primary | ICD-10-CM

## 2018-10-11 DIAGNOSIS — N18.2 CONTROLLED TYPE 2 DIABETES MELLITUS WITH STAGE 2 CHRONIC KIDNEY DISEASE, WITHOUT LONG-TERM CURRENT USE OF INSULIN: ICD-10-CM

## 2018-10-11 DIAGNOSIS — F32.0 CURRENT MILD EPISODE OF MAJOR DEPRESSIVE DISORDER, UNSPECIFIED WHETHER RECURRENT: ICD-10-CM

## 2018-10-11 DIAGNOSIS — S41.112D LACERATION OF LEFT UPPER EXTREMITY, SUBSEQUENT ENCOUNTER: ICD-10-CM

## 2018-10-11 DIAGNOSIS — N18.2 CONTROLLED TYPE 2 DIABETES MELLITUS WITH STAGE 2 CHRONIC KIDNEY DISEASE, WITHOUT LONG-TERM CURRENT USE OF INSULIN: Primary | ICD-10-CM

## 2018-10-11 LAB
ALBUMIN SERPL BCP-MCNC: 3.4 G/DL
ALP SERPL-CCNC: 106 U/L
ALT SERPL W/O P-5'-P-CCNC: 12 U/L
ANION GAP SERPL CALC-SCNC: 8 MMOL/L
AST SERPL-CCNC: 14 U/L
BASOPHILS # BLD AUTO: 0.02 K/UL
BASOPHILS NFR BLD: 0.2 %
BILIRUB SERPL-MCNC: 0.6 MG/DL
BUN SERPL-MCNC: 17 MG/DL
CALCIUM SERPL-MCNC: 9.7 MG/DL
CHLORIDE SERPL-SCNC: 106 MMOL/L
CHOLEST SERPL-MCNC: 224 MG/DL
CHOLEST/HDLC SERPL: 5.1 {RATIO}
CO2 SERPL-SCNC: 25 MMOL/L
CREAT SERPL-MCNC: 0.9 MG/DL
DIFFERENTIAL METHOD: ABNORMAL
EOSINOPHIL # BLD AUTO: 0 K/UL
EOSINOPHIL NFR BLD: 0.2 %
ERYTHROCYTE [DISTWIDTH] IN BLOOD BY AUTOMATED COUNT: 14.2 %
EST. GFR  (AFRICAN AMERICAN): >60 ML/MIN/1.73 M^2
EST. GFR  (NON AFRICAN AMERICAN): 55 ML/MIN/1.73 M^2
ESTIMATED AVG GLUCOSE: 105 MG/DL
GLUCOSE SERPL-MCNC: 148 MG/DL
HBA1C MFR BLD HPLC: 5.3 %
HCT VFR BLD AUTO: 40.3 %
HDLC SERPL-MCNC: 44 MG/DL
HDLC SERPL: 19.6 %
HGB BLD-MCNC: 13.4 G/DL
LDLC SERPL CALC-MCNC: 139.8 MG/DL
LYMPHOCYTES # BLD AUTO: 1.6 K/UL
LYMPHOCYTES NFR BLD: 15.4 %
MCH RBC QN AUTO: 29.4 PG
MCHC RBC AUTO-ENTMCNC: 33.3 G/DL
MCV RBC AUTO: 88 FL
MONOCYTES # BLD AUTO: 0.7 K/UL
MONOCYTES NFR BLD: 6.6 %
NEUTROPHILS # BLD AUTO: 8.2 K/UL
NEUTROPHILS NFR BLD: 77.3 %
NONHDLC SERPL-MCNC: 180 MG/DL
PLATELET # BLD AUTO: 211 K/UL
PMV BLD AUTO: 10.3 FL
POTASSIUM SERPL-SCNC: 4.6 MMOL/L
PROT SERPL-MCNC: 6.8 G/DL
RBC # BLD AUTO: 4.56 M/UL
SODIUM SERPL-SCNC: 139 MMOL/L
TRIGL SERPL-MCNC: 201 MG/DL
WBC # BLD AUTO: 10.55 K/UL

## 2018-10-11 PROCEDURE — 80061 LIPID PANEL: CPT

## 2018-10-11 PROCEDURE — 99213 OFFICE O/P EST LOW 20 MIN: CPT | Mod: PBBFAC,25 | Performed by: INTERNAL MEDICINE

## 2018-10-11 PROCEDURE — 90732 PPSV23 VACC 2 YRS+ SUBQ/IM: CPT | Mod: PBBFAC

## 2018-10-11 PROCEDURE — 99999 PR PBB SHADOW E&M-EST. PATIENT-LVL III: CPT | Mod: PBBFAC,,, | Performed by: INTERNAL MEDICINE

## 2018-10-11 PROCEDURE — 99215 OFFICE O/P EST HI 40 MIN: CPT | Mod: S$PBB,,, | Performed by: INTERNAL MEDICINE

## 2018-10-11 PROCEDURE — 90662 IIV NO PRSV INCREASED AG IM: CPT | Mod: PBBFAC

## 2018-10-11 PROCEDURE — 80053 COMPREHEN METABOLIC PANEL: CPT

## 2018-10-11 PROCEDURE — 36415 COLL VENOUS BLD VENIPUNCTURE: CPT

## 2018-10-11 PROCEDURE — 85025 COMPLETE CBC W/AUTO DIFF WBC: CPT

## 2018-10-11 PROCEDURE — 83036 HEMOGLOBIN GLYCOSYLATED A1C: CPT

## 2018-10-11 RX ORDER — LOSARTAN POTASSIUM 100 MG/1
100 TABLET ORAL DAILY
Qty: 90 TABLET | Refills: 3 | Status: SHIPPED | OUTPATIENT
Start: 2018-10-11 | End: 2019-05-28 | Stop reason: SDUPTHER

## 2018-10-11 RX ORDER — MIRTAZAPINE 15 MG/1
15 TABLET, FILM COATED ORAL NIGHTLY
Qty: 90 TABLET | Refills: 3 | Status: SHIPPED | OUTPATIENT
Start: 2018-10-11 | End: 2019-05-28 | Stop reason: SDUPTHER

## 2018-10-11 RX ORDER — METOPROLOL SUCCINATE 50 MG/1
50 TABLET, EXTENDED RELEASE ORAL DAILY
Qty: 90 TABLET | Refills: 3 | Status: SHIPPED | OUTPATIENT
Start: 2018-10-11 | End: 2019-05-28 | Stop reason: SDUPTHER

## 2018-10-11 RX ORDER — AMLODIPINE BESYLATE 10 MG/1
10 TABLET ORAL DAILY
Qty: 90 TABLET | Refills: 3 | Status: SHIPPED | OUTPATIENT
Start: 2018-10-11 | End: 2019-05-28 | Stop reason: SDUPTHER

## 2018-10-11 RX ORDER — ATORVASTATIN CALCIUM 40 MG/1
40 TABLET, FILM COATED ORAL DAILY
Qty: 90 TABLET | Refills: 3 | Status: SHIPPED | OUTPATIENT
Start: 2018-10-11 | End: 2019-05-28 | Stop reason: SDUPTHER

## 2018-10-11 RX ORDER — METFORMIN HYDROCHLORIDE 500 MG/1
500 TABLET, EXTENDED RELEASE ORAL NIGHTLY
Qty: 90 TABLET | Refills: 3 | Status: SHIPPED | OUTPATIENT
Start: 2018-10-11 | End: 2019-05-28

## 2018-10-11 NOTE — PROGRESS NOTES
Subjective:      Patient ID: Audrey Lama is a 89 y.o. female.    Chief Complaint: Wound Check (left arm and hand) and Follow-up (diabetes, hypertension)    HPI:  HPI   Patient comes in and has had a fall resulting in a tear to the left arm and 7 stitches to the left 3rd finger. The area has not been cleaned well but there is not evidence of infection      She is under considerable stress since the death of her son.    Diabetes Management Status    Statin: Taking  ACE/ARB: Taking    Screening or Prevention Patient's value Goal Complete/Controlled?   HgA1C Testing and Control   Lab Results   Component Value Date    HGBA1C 5.3 10/11/2018      Annually/Less than 8% No   Lipid profile : 06/29/2016 Annually No   LDL control Lab Results   Component Value Date    LDLCALC 139.8 10/11/2018    Annually/Less than 100 mg/dl  No   Nephropathy screening Lab Results   Component Value Date    LABMICR 3.9 04/23/2009     Lab Results   Component Value Date    PROTEINUA Negative 04/15/2010    Annually No   Blood pressure BP Readings from Last 1 Encounters:   10/11/18 (!) 144/58    Less than 140/90 No   Dilated retinal exam : 11/15/2016 Annually Yes   Foot exam   Most Recent Foot Exam Date: Not Found Annually No       Patient Active Problem List   Diagnosis    Macular degeneration - Both Eyes    Type II diabetes mellitus    Hypertension    Hyperlipidemia    Ovarian cancer    Pseudophakia    Posterior vitreous detachment    Depression    Diabetes type 2, controlled     Past Medical History:   Diagnosis Date    Diabetes mellitus     Hypertension     Macular degeneration     Squamous cell carcinoma      Past Surgical History:   Procedure Laterality Date    CATARACT EXTRACTION       Family History   Problem Relation Age of Onset    Amblyopia Sister     Hypertension Sister     Cancer Son     Heart attack Son     Blindness Neg Hx     Cataracts Neg Hx     Diabetes Neg Hx     Glaucoma Neg Hx     Macular degeneration  "Neg Hx     Retinal detachment Neg Hx     Strabismus Neg Hx     Stroke Neg Hx     Thyroid disease Neg Hx      Review of Systems   Constitutional: Negative for chills, fever and unexpected weight change.   HENT: Negative for trouble swallowing.    Respiratory: Negative for cough, shortness of breath and wheezing.    Cardiovascular: Negative for chest pain and palpitations.   Gastrointestinal: Negative for abdominal distention, abdominal pain, blood in stool and vomiting.   Musculoskeletal: Negative for back pain.     Objective:     Vitals:    10/11/18 1032   BP: (!) 144/58   Pulse: (!) 59   SpO2: 97%   Weight: 71.3 kg (157 lb 3 oz)   Height: 5' 4" (1.626 m)   PainSc: 0-No pain     Body mass index is 26.98 kg/m².  Physical Exam   Constitutional: She appears well-developed and well-nourished.   Neck: No JVD present. No thyromegaly present.   Cardiovascular: Normal rate, normal heart sounds and intact distal pulses.   Pulmonary/Chest: Effort normal and breath sounds normal. No respiratory distress.   Musculoskeletal:   Left arm: no evidence of infection, there is a skin tear which is still bleeding    Left hand: 7 stitches in a V about the 3rd finger     Assessment:     1. Controlled type 2 diabetes mellitus with stage 2 chronic kidney disease, without long-term current use of insulin    2. Laceration of left upper extremity, subsequent encounter    3. Current mild episode of major depressive disorder, unspecified whether recurrent    4. Pure hypercholesterolemia    5. Essential hypertension      Plan:   Audrey was seen today for wound check and follow-up.    Diagnoses and all orders for this visit:    Controlled type 2 diabetes mellitus with stage 2 chronic kidney disease, without long-term current use of insulin  Comments:  On meds but no lab  Labs  Orders:  -     CBC auto differential; Future  -     Comprehensive metabolic panel; Future  -     Hemoglobin A1c; Future  -     Lipid panel; Future    Laceration of left " upper extremity, subsequent encounter  Comments:  No evidence of infection, too early for stitches removal, cleaned  FU next week    Current mild episode of major depressive disorder, unspecified whether recurrent  Comments:  Symptoms worse since death of son  Same meds    Pure hypercholesterolemia  Comments:  Monitored  Labs requested of patient    Essential hypertension  Comments:  Well controlled  Same meds    Other orders  -     atorvastatin (LIPITOR) 40 MG tablet; Take 1 tablet (40 mg total) by mouth once daily.  -     amLODIPine (NORVASC) 10 MG tablet; Take 1 tablet (10 mg total) by mouth once daily.  -     losartan (COZAAR) 100 MG tablet; Take 1 tablet (100 mg total) by mouth once daily.  -     metFORMIN (GLUCOPHAGE-XR) 500 MG 24 hr tablet; Take 1 tablet (500 mg total) by mouth every evening.  -     mirtazapine (REMERON) 15 MG tablet; Take 1 tablet (15 mg total) by mouth every evening.  -     metoprolol succinate (TOPROL XL) 50 MG 24 hr tablet; Take 1 tablet (50 mg total) by mouth once daily.        Problem List Items Addressed This Visit     Hypertension    Hyperlipidemia    Depression    Diabetes type 2, controlled - Primary    Relevant Medications    metFORMIN (GLUCOPHAGE-XR) 500 MG 24 hr tablet    Other Relevant Orders    CBC auto differential (Completed)    Comprehensive metabolic panel (Completed)    Hemoglobin A1c (Completed)    Lipid panel (Completed)      Other Visit Diagnoses     Laceration of left upper extremity, subsequent encounter        No evidence of infection, too early for stitches removal, cleaned  FU next week        Orders Placed This Encounter   Procedures    CBC auto differential     Standing Status:   Future     Number of Occurrences:   1     Standing Expiration Date:   12/10/2018    Comprehensive metabolic panel     Standing Status:   Future     Number of Occurrences:   1     Standing Expiration Date:   12/10/2018    Hemoglobin A1c     Standing Status:   Future     Number of  Occurrences:   1     Standing Expiration Date:   12/10/2018    Lipid panel     Standing Status:   Future     Number of Occurrences:   1     Standing Expiration Date:   12/10/2018     Follow-up in about 1 week (around 10/18/2018) for Follow up laceration.     Medication List           Accurate as of 10/11/18  6:18 PM. If you have any questions, ask your nurse or doctor.               CHANGE how you take these medications    amLODIPine 10 MG tablet  Commonly known as:  NORVASC  Take 1 tablet (10 mg total) by mouth once daily.  What changed:  See the new instructions.  Changed by:  Jia Borjas MD     losartan 100 MG tablet  Commonly known as:  COZAAR  Take 1 tablet (100 mg total) by mouth once daily.  What changed:  See the new instructions.  Changed by:  Jia Borjas MD     metoprolol succinate 50 MG 24 hr tablet  Commonly known as:  TOPROL XL  Take 1 tablet (50 mg total) by mouth once daily.  What changed:  See the new instructions.  Changed by:  Jia Borjas MD     mirtazapine 15 MG tablet  Commonly known as:  REMERON  Take 1 tablet (15 mg total) by mouth every evening.  What changed:  See the new instructions.  Changed by:  Jia Borjas MD        CONTINUE taking these medications    ANTIOXIDANT ORAL     aspirin 81 mg Tab     atorvastatin 40 MG tablet  Commonly known as:  LIPITOR  Take 1 tablet (40 mg total) by mouth once daily.     FLUCELVAX QUAD [id number] (PF) 60 mcg (15 mcg x 4)/0.5 mL Syrg vaccine  Generic drug:  influenza     fluorouracil 5 % cream  Commonly known as:  EFUDEX  AAA on left arm bid x 2-4 weeks     LUTEIN ORAL     metFORMIN 500 MG 24 hr tablet  Commonly known as:  GLUCOPHAGE-XR  Take 1 tablet (500 mg total) by mouth every evening.     traMADol 50 mg tablet  Commonly known as:  ULTRAM  Take 1 tablet (50 mg total) by mouth every 6 (six) hours as needed for Pain.        STOP taking these medications    cephALEXin 500 MG capsule  Commonly known as:  KEFLEX  Stopped by:  Jia Borjas  MD           Where to Get Your Medications      These medications were sent to [facility] - [place], [place] - [address]  [address] [address]    Phone:  [phone]   · amLODIPine 10 MG tablet  · atorvastatin 40 MG tablet  · losartan 100 MG tablet  · metFORMIN 500 MG 24 hr tablet  · metoprolol succinate 50 MG 24 hr tablet  · mirtazapine 15 MG tablet

## 2018-10-18 ENCOUNTER — OFFICE VISIT (OUTPATIENT)
Dept: INTERNAL MEDICINE | Facility: CLINIC | Age: 83
End: 2018-10-18
Payer: MEDICARE

## 2018-10-18 VITALS — HEIGHT: 64 IN | OXYGEN SATURATION: 97 % | HEART RATE: 63 BPM | BODY MASS INDEX: 26.8 KG/M2 | WEIGHT: 157 LBS

## 2018-10-18 DIAGNOSIS — E78.00 PURE HYPERCHOLESTEROLEMIA: ICD-10-CM

## 2018-10-18 DIAGNOSIS — S51.812D SKIN TEAR OF LEFT FOREARM WITHOUT COMPLICATION, SUBSEQUENT ENCOUNTER: Primary | ICD-10-CM

## 2018-10-18 DIAGNOSIS — E11.22 CONTROLLED TYPE 2 DIABETES MELLITUS WITH STAGE 3 CHRONIC KIDNEY DISEASE, WITHOUT LONG-TERM CURRENT USE OF INSULIN: ICD-10-CM

## 2018-10-18 DIAGNOSIS — N18.30 CONTROLLED TYPE 2 DIABETES MELLITUS WITH STAGE 3 CHRONIC KIDNEY DISEASE, WITHOUT LONG-TERM CURRENT USE OF INSULIN: ICD-10-CM

## 2018-10-18 DIAGNOSIS — I10 ESSENTIAL HYPERTENSION: ICD-10-CM

## 2018-10-18 DIAGNOSIS — T14.8XXA LACERATION INVOLVING TENDON: ICD-10-CM

## 2018-10-18 PROCEDURE — 99213 OFFICE O/P EST LOW 20 MIN: CPT | Mod: PBBFAC | Performed by: INTERNAL MEDICINE

## 2018-10-18 PROCEDURE — 99214 OFFICE O/P EST MOD 30 MIN: CPT | Mod: S$PBB,,, | Performed by: INTERNAL MEDICINE

## 2018-10-18 PROCEDURE — 99999 PR PBB SHADOW E&M-EST. PATIENT-LVL III: CPT | Mod: PBBFAC,,, | Performed by: INTERNAL MEDICINE

## 2018-10-19 NOTE — PROGRESS NOTES
Subjective:      Patient ID: Audrey Lama is a 89 y.o. female.    Chief Complaint: Follow-up    HPI:  HPI   Follow up left forearm tear and will need removal of stitches 7 from the middle finger that it was determined to be too soon.    Labs reviewed    Diabetes Management Status    Statin: Taking  ACE/ARB: Taking    Screening or Prevention Patient's value Goal Complete/Controlled?   HgA1C Testing and Control   Lab Results   Component Value Date    HGBA1C 5.3 10/11/2018      Annually/Less than 8% Yes   Lipid profile : 10/11/2018 Annually Yes   LDL control Lab Results   Component Value Date    LDLCALC 139.8 10/11/2018    Annually/Less than 100 mg/dl  No   Nephropathy screening Lab Results   Component Value Date    LABMICR 3.9 04/23/2009     Lab Results   Component Value Date    PROTEINUA Negative 04/15/2010    Annually No   Blood pressure BP Readings from Last 1 Encounters:   10/11/18 (!) 144/58    Less than 140/90 No   Dilated retinal exam : 11/15/2016 Annually Yes   Foot exam   Most Recent Foot Exam Date: Not Found Annually No       Patient Active Problem List   Diagnosis    Macular degeneration - Both Eyes    Type II diabetes mellitus    Hypertension    Hyperlipidemia    Ovarian cancer    Pseudophakia    Posterior vitreous detachment    Depression    Diabetes type 2, controlled     Past Medical History:   Diagnosis Date    Diabetes mellitus     Hypertension     Macular degeneration     Squamous cell carcinoma      Past Surgical History:   Procedure Laterality Date    CATARACT EXTRACTION       Family History   Problem Relation Age of Onset    Amblyopia Sister     Hypertension Sister     Cancer Son     Heart attack Son     Blindness Neg Hx     Cataracts Neg Hx     Diabetes Neg Hx     Glaucoma Neg Hx     Macular degeneration Neg Hx     Retinal detachment Neg Hx     Strabismus Neg Hx     Stroke Neg Hx     Thyroid disease Neg Hx      Review of Systems  Objective:     Vitals:    10/18/18  "1316   Pulse: 63   SpO2: 97%   Weight: 71.2 kg (157 lb)   Height: 5' 4" (1.626 m)   PainSc: 0-No pain     Body mass index is 26.95 kg/m².  Physical Exam   Constitutional: She appears well-developed and well-nourished.   Neck: No JVD present. No thyromegaly present.   Cardiovascular: Normal rate, normal heart sounds and intact distal pulses.   Pulmonary/Chest: Effort normal and breath sounds normal. No respiratory distress.   Musculoskeletal:   Left forearm healing  Stitches removed     Assessment:     1. Skin tear of left forearm without complication, subsequent encounter    2. Laceration involving tendon    3. Controlled type 2 diabetes mellitus with stage 3 chronic kidney disease, without long-term current use of insulin    4. Pure hypercholesterolemia    5. Essential hypertension      Plan:   Audrey was seen today for follow-up.    Diagnoses and all orders for this visit:    Skin tear of left forearm without complication, subsequent encounter: improving, medihoney and wrap    Laceration involving tendon: stitches remove finger    Controlled type 2 diabetes mellitus with stage 3 chronic kidney disease, without long-term current use of insulin: review of lab: same meds    Pure hypercholesterolemia: review of labs same meds    Essential hypertension: well controlled same labs        Problem List Items Addressed This Visit     Hypertension    Hyperlipidemia    Diabetes type 2, controlled      Other Visit Diagnoses     Skin tear of left forearm without complication, subsequent encounter    -  Primary    Laceration involving tendon            No orders of the defined types were placed in this encounter.    No Follow-up on file.     Medication List           Accurate as of 10/18/18 11:59 PM. If you have any questions, ask your nurse or doctor.               CONTINUE taking these medications    amLODIPine 10 MG tablet  Commonly known as:  NORVASC  Take 1 tablet (10 mg total) by mouth once daily.     ANTIOXIDANT ORAL   "   aspirin 81 mg Tab     atorvastatin 40 MG tablet  Commonly known as:  LIPITOR  Take 1 tablet (40 mg total) by mouth once daily.     FLUCELVAX QUAD 7892-9893 (PF) 60 mcg (15 mcg x 4)/0.5 mL Syrg vaccine  Generic drug:  influenza     fluorouracil 5 % cream  Commonly known as:  EFUDEX  AAA on left arm bid x 2-4 weeks     losartan 100 MG tablet  Commonly known as:  COZAAR  Take 1 tablet (100 mg total) by mouth once daily.     LUTEIN ORAL     metFORMIN 500 MG 24 hr tablet  Commonly known as:  GLUCOPHAGE-XR  Take 1 tablet (500 mg total) by mouth every evening.     metoprolol succinate 50 MG 24 hr tablet  Commonly known as:  TOPROL XL  Take 1 tablet (50 mg total) by mouth once daily.     mirtazapine 15 MG tablet  Commonly known as:  REMERON  Take 1 tablet (15 mg total) by mouth every evening.

## 2018-10-30 ENCOUNTER — TELEPHONE (OUTPATIENT)
Dept: INTERNAL MEDICINE | Facility: CLINIC | Age: 83
End: 2018-10-30

## 2018-10-30 DIAGNOSIS — H91.90 DECREASED HEARING, UNSPECIFIED LATERALITY: Primary | ICD-10-CM

## 2018-10-30 NOTE — TELEPHONE ENCOUNTER
----- Message from Carri Lester sent at 10/30/2018 10:42 AM CDT -----  Contact: Noemy/Daughter/[phone]  Patient would like to get a referral.  Does the patient already have the specialty clinic appointment scheduled:    If yes, what date is the appointment scheduled:   no  Referral to what specialty:  Hearing aid  Reason (be specific):  Hearing aid broke.  Did you confirm the insurance currently in Epic is correct (important for a referral):    Does the patient want the referral with a specific physician:  SANDY Madsen  [facility]  Comments:  Thank you

## 2018-10-30 NOTE — TELEPHONE ENCOUNTER
I put a consult in to Audiology:    There also may also be more of a store where you don't need a consult.

## 2018-12-10 ENCOUNTER — OFFICE VISIT (OUTPATIENT)
Dept: INTERNAL MEDICINE | Facility: CLINIC | Age: 83
End: 2018-12-10
Payer: MEDICARE

## 2018-12-10 VITALS
DIASTOLIC BLOOD PRESSURE: 60 MMHG | WEIGHT: 160.94 LBS | BODY MASS INDEX: 27.48 KG/M2 | OXYGEN SATURATION: 98 % | TEMPERATURE: 98 F | SYSTOLIC BLOOD PRESSURE: 124 MMHG | HEART RATE: 70 BPM | HEIGHT: 64 IN

## 2018-12-10 DIAGNOSIS — M79.602 LEFT ARM PAIN: Primary | ICD-10-CM

## 2018-12-10 PROCEDURE — 99213 OFFICE O/P EST LOW 20 MIN: CPT | Mod: S$PBB,,, | Performed by: NURSE PRACTITIONER

## 2018-12-10 PROCEDURE — 99215 OFFICE O/P EST HI 40 MIN: CPT | Mod: PBBFAC | Performed by: NURSE PRACTITIONER

## 2018-12-10 PROCEDURE — 99999 PR PBB SHADOW E&M-EST. PATIENT-LVL V: CPT | Mod: PBBFAC,,, | Performed by: NURSE PRACTITIONER

## 2018-12-10 NOTE — PROGRESS NOTES
Subjective:       Patient ID: Audrey Lama is a 89 y.o. female.    Chief Complaint: Back Pain and Arm Pain    Ms. Lama presents today for pain in her left arm and left upper back x 2 weeks. Prior to onset of the pain, she reports that she quickly folded 6 loads of laundry. Her daughter gave her 2 doses of aleve last week, which helped a lot with the pain. Heating pad has also helped.       Review of Systems   Constitutional: Negative for fever.   HENT: Negative for facial swelling.    Eyes: Negative for visual disturbance.   Respiratory: Negative for cough, chest tightness and shortness of breath.    Cardiovascular: Negative for chest pain.   Gastrointestinal: Negative for constipation.   Genitourinary: Negative for dysuria.   Musculoskeletal: Positive for arthralgias and back pain.   Skin: Negative for rash.   Neurological: Negative for headaches.   Psychiatric/Behavioral: Negative for confusion.       Objective:      Physical Exam   Constitutional: She appears well-developed and well-nourished. No distress.   Cardiovascular: Normal rate, regular rhythm and normal heart sounds.   Pulmonary/Chest: Effort normal and breath sounds normal. No stridor. No respiratory distress. She has no wheezes. She has no rales.   Musculoskeletal:        Left shoulder: She exhibits pain. She exhibits normal range of motion, no tenderness, no bony tenderness, no swelling, no effusion, no crepitus, no deformity, no laceration, no spasm, normal pulse and normal strength.        Left elbow: She exhibits normal range of motion, no swelling, no effusion, no deformity and no laceration. No tenderness found.        Back:    Skin: She is not diaphoretic.   Nursing note and vitals reviewed.      Assessment:       1. Left arm pain        Plan:   1. Left arm pain  - Tylenol Arthritis q 8 PRN.  - Warm moist heat.   - Avoid Aleve unless Tylenol is ineffective due to stage 3 CKD.   - Defer imaging. If no improvement with Tylenol with get  shoulder/thoracic back xrays      Pt has been given instructions populated from [employer] database and has verbalized understanding of the after visit summary and information contained wherein.    Follow up with a primary care provider. May go to ER for acute shortness of breath, lightheadedness, fever, or any other emergent complaints or changes in condition.

## 2019-01-28 ENCOUNTER — TELEPHONE (OUTPATIENT)
Dept: INTERNAL MEDICINE | Facility: CLINIC | Age: 84
End: 2019-01-28

## 2019-01-28 DIAGNOSIS — R54 AGE-RELATED PHYSICAL DEBILITY: Primary | ICD-10-CM

## 2019-01-28 DIAGNOSIS — Z79.899 MEDICATION MANAGEMENT: ICD-10-CM

## 2019-01-28 NOTE — TELEPHONE ENCOUNTER
----- Message from Meryl Gloria sent at 1/28/2019  9:18 AM CST -----  Contact: PT daughter Zoya [phone]  PT daughter is requesting a call back in reference to getting pt set up with Home Health. Pt daughter states pt can not care for her self properly with out assistance. Please call and advise.

## 2019-01-28 NOTE — TELEPHONE ENCOUNTER
We will need to clarify how to help this patient in order to send an order    Questions  Nurse for medication management  PT for deconditioning  Aide to help bather    Is this what they feel is necessary or any other concerns.

## 2019-01-28 NOTE — TELEPHONE ENCOUNTER
Dr Borjas ,pt's daughter is requesting Home Health to help     assess pt's medical status( she has some swelling in her     lower extremity, decreased diet intake, little hygiene     care. Pt may need social service or case management     assistance.  A family member and girlfriend lives with pt.     Pt is alert  And able to receive assistance. Please advise.

## 2019-01-30 NOTE — TELEPHONE ENCOUNTER
Please tell the caller that I have put in orders for home health but if MS. Ventura refuses they will not initiate the service. Dr. Borjas

## 2019-02-04 ENCOUNTER — TELEPHONE (OUTPATIENT)
Dept: INTERNAL MEDICINE | Facility: CLINIC | Age: 84
End: 2019-02-04

## 2019-02-04 NOTE — TELEPHONE ENCOUNTER
----- Message from Hardy Mercado sent at 1/31/2019  4:02 PM CST -----  Contact: Abraham/[facility]/[phone]  Nurse is stating that they've received home health orders. Pt schedule to be seen on tomorrow but pt's daughter wants to be present so she will be there for admissions on Sunday. Please advise.          Thank You

## 2019-02-04 NOTE — TELEPHONE ENCOUNTER
----- Message from Louisa Murillo sent at 2/4/2019 12:48 PM CST -----  Contact: daughter Noemy  [phone]  Patient;s daughter is requesting a letter stating that patient is both Hard hearing and macular degenerative; she's trying to get a free phone for her mother. She's asking if you can email it to her email  [email or web address]    Thank you

## 2019-02-06 ENCOUNTER — TELEPHONE (OUTPATIENT)
Dept: AUDIOLOGY | Facility: CLINIC | Age: 84
End: 2019-02-06

## 2019-02-07 ENCOUNTER — CLINICAL SUPPORT (OUTPATIENT)
Dept: AUDIOLOGY | Facility: CLINIC | Age: 84
End: 2019-02-07
Payer: MEDICARE

## 2019-02-07 DIAGNOSIS — H90.3 SENSORINEURAL HEARING LOSS, BILATERAL: Primary | ICD-10-CM

## 2019-02-07 PROCEDURE — 99499 NO LOS: ICD-10-PCS | Mod: S$GLB,,, | Performed by: OTOLARYNGOLOGY

## 2019-02-07 PROCEDURE — 92557 COMPREHENSIVE HEARING TEST: CPT | Mod: PBBFAC | Performed by: AUDIOLOGIST-HEARING AID FITTER

## 2019-02-07 PROCEDURE — 99499 UNLISTED E&M SERVICE: CPT | Mod: S$GLB,,, | Performed by: OTOLARYNGOLOGY

## 2019-02-07 NOTE — PROGRESS NOTES
Audrey Lama was seen in the clinic today for a hearing aid follow-up. She currently wears a left Widex Unique 220 fusion hearing aid.     The hearing aid was programmed based on the results of today's hearing test. There was no significant decrease in hearing since her last hearing test in 2015. Her dome was switched to a small power. Acclimatization was set to 2. User preference was decreased two clicks towards comfort to help with ambient noise. She was counseled on the importance of daily, consistent hearing aid use. Ms. Lama's daughter decided to order a matching hearing aid for the right ear. The contract was signed.     An appointment was scheduled for Ms. Lama to return to the clinic to  the right hearing aid.

## 2019-02-07 NOTE — PROGRESS NOTES
Audrey Lama was seen in the clinic today for a hearing evaluation. Ms. Lama was accompanied by her daughter. She currently wears a [employer]ster Unique 220 fusion hearing aid in the left ear. Her last hearing test was in 2015.     Otoscopy was unremarkable. Audiological testing revealed a moderate to moderately severe sensorineural hearing loss in the right ear and a moderate to severe sensorineural hearing loss in the left ear. A speech reception threshold was obtained at 55 dBHL, bilaterally. Speech recognition was 52% in the right ear and 56% in the left ear.    Recommendations:  1. Otologic evaluation  2. Annual hearing evaluation  3. Continued use of hearing aid/Hearing aid for the right ear

## 2019-02-08 ENCOUNTER — TELEPHONE (OUTPATIENT)
Dept: INTERNAL MEDICINE | Facility: CLINIC | Age: 84
End: 2019-02-08

## 2019-02-08 NOTE — TELEPHONE ENCOUNTER
----- Message from Jordyn Madsen sent at 2/8/2019  1:34 PM CST -----  Contact: Kimberly/[facility]/ [phone]  Would like a nurse to go out tomorrow to collect a urine specimen. Patient daughter called to say the her mother urine dark with a slight odor to it. Please call and advise. Fax # [phone], or call for verbal orders.

## 2019-02-09 ENCOUNTER — TELEPHONE (OUTPATIENT)
Dept: INTERNAL MEDICINE | Facility: CLINIC | Age: 84
End: 2019-02-09

## 2019-02-09 ENCOUNTER — LAB VISIT (OUTPATIENT)
Dept: LAB | Facility: HOSPITAL | Age: 84
End: 2019-02-09
Attending: INTERNAL MEDICINE
Payer: MEDICARE

## 2019-02-09 DIAGNOSIS — E11.9 DIABETES MELLITUS WITHOUT COMPLICATION: Primary | ICD-10-CM

## 2019-02-09 DIAGNOSIS — N39.0 URINARY TRACT INFECTION, SITE NOT SPECIFIED: ICD-10-CM

## 2019-02-09 LAB
BILIRUB UR QL STRIP: NEGATIVE
CLARITY UR REFRACT.AUTO: CLEAR
COLOR UR AUTO: YELLOW
GLUCOSE UR QL STRIP: NEGATIVE
HGB UR QL STRIP: NEGATIVE
KETONES UR QL STRIP: NEGATIVE
LEUKOCYTE ESTERASE UR QL STRIP: ABNORMAL
MICROSCOPIC COMMENT: ABNORMAL
NITRITE UR QL STRIP: NEGATIVE
PH UR STRIP: 7 [PH] (ref 5–8)
PROT UR QL STRIP: NEGATIVE
RBC #/AREA URNS AUTO: 1 /HPF (ref 0–4)
SP GR UR STRIP: 1.02 (ref 1–1.03)
SQUAMOUS #/AREA URNS AUTO: 2 /HPF
URN SPEC COLLECT METH UR: ABNORMAL
WBC #/AREA URNS AUTO: 28 /HPF (ref 0–5)

## 2019-02-09 PROCEDURE — 87086 URINE CULTURE/COLONY COUNT: CPT

## 2019-02-09 PROCEDURE — 87186 SC STD MICRODIL/AGAR DIL: CPT

## 2019-02-09 PROCEDURE — 87088 URINE BACTERIA CULTURE: CPT

## 2019-02-09 PROCEDURE — 87077 CULTURE AEROBIC IDENTIFY: CPT

## 2019-02-09 PROCEDURE — 81001 URINALYSIS AUTO W/SCOPE: CPT

## 2019-02-12 ENCOUNTER — TELEPHONE (OUTPATIENT)
Dept: INTERNAL MEDICINE | Facility: CLINIC | Age: 84
End: 2019-02-12

## 2019-02-12 LAB — BACTERIA UR CULT: NORMAL

## 2019-02-12 RX ORDER — NITROFURANTOIN 25; 75 MG/1; MG/1
100 CAPSULE ORAL 2 TIMES DAILY
Qty: 14 CAPSULE | Refills: 0 | Status: SHIPPED | OUTPATIENT
Start: 2019-02-12 | End: 2019-02-26

## 2019-02-12 NOTE — TELEPHONE ENCOUNTER
----- Message from Nisha Guzmán sent at 2/11/2019  5:08 PM CST -----  Contact: DALY Styles [phone]  Requesting order for Lab due to altered mental status and endemia. Depression screen requested.    Please call and advise  Thank you

## 2019-02-12 NOTE — TELEPHONE ENCOUNTER
Does the nurse do a depression screen: if not she needs to come in or see Neurology    They can draw a cbc and cmp, B12 and tsh: dementia code

## 2019-02-12 NOTE — TELEPHONE ENCOUNTER
----- Message from Kimberly Tai sent at 2/12/2019  2:11 PM CST -----  Contact: Daughter/ Noemy [phone]  Patient is returning a phone call.  Who left a message for the patient: Fiordaliza  Does patient know what this is regarding:  No

## 2019-02-12 NOTE — TELEPHONE ENCOUNTER
Please tell responsible family member that phoned in the concerns of [employer] that macrobid was sent to the [facility].

## 2019-02-13 ENCOUNTER — DOCUMENTATION ONLY (OUTPATIENT)
Dept: AUDIOLOGY | Facility: CLINIC | Age: 84
End: 2019-02-13

## 2019-02-13 ENCOUNTER — TELEPHONE (OUTPATIENT)
Dept: AUDIOLOGY | Facility: CLINIC | Age: 84
End: 2019-02-13

## 2019-02-13 NOTE — PROGRESS NOTES
Patient decided on returning hearing aids due to not being able to remove them easily. Patient is aware of the $250 charge for restocking fee.

## 2019-02-26 ENCOUNTER — LAB VISIT (OUTPATIENT)
Dept: LAB | Facility: HOSPITAL | Age: 84
End: 2019-02-26
Attending: INTERNAL MEDICINE
Payer: MEDICARE

## 2019-02-26 ENCOUNTER — OFFICE VISIT (OUTPATIENT)
Dept: INTERNAL MEDICINE | Facility: CLINIC | Age: 84
End: 2019-02-26
Payer: MEDICARE

## 2019-02-26 ENCOUNTER — TELEPHONE (OUTPATIENT)
Dept: INTERNAL MEDICINE | Facility: CLINIC | Age: 84
End: 2019-02-26

## 2019-02-26 VITALS
WEIGHT: 160 LBS | HEIGHT: 64 IN | SYSTOLIC BLOOD PRESSURE: 138 MMHG | DIASTOLIC BLOOD PRESSURE: 74 MMHG | HEART RATE: 69 BPM | OXYGEN SATURATION: 96 % | BODY MASS INDEX: 27.31 KG/M2

## 2019-02-26 DIAGNOSIS — E11.22 CONTROLLED TYPE 2 DIABETES MELLITUS WITH STAGE 2 CHRONIC KIDNEY DISEASE, WITHOUT LONG-TERM CURRENT USE OF INSULIN: ICD-10-CM

## 2019-02-26 DIAGNOSIS — I10 ESSENTIAL HYPERTENSION: Primary | ICD-10-CM

## 2019-02-26 DIAGNOSIS — N18.2 CONTROLLED TYPE 2 DIABETES MELLITUS WITH STAGE 2 CHRONIC KIDNEY DISEASE, WITHOUT LONG-TERM CURRENT USE OF INSULIN: ICD-10-CM

## 2019-02-26 DIAGNOSIS — E78.00 PURE HYPERCHOLESTEROLEMIA: ICD-10-CM

## 2019-02-26 DIAGNOSIS — Z09 NEED FOR FOLLOW-UP BY HOME HEALTH SERVICE: ICD-10-CM

## 2019-02-26 LAB
ALBUMIN SERPL BCP-MCNC: 3.6 G/DL
ALP SERPL-CCNC: 105 U/L
ALT SERPL W/O P-5'-P-CCNC: 17 U/L
ANION GAP SERPL CALC-SCNC: 6 MMOL/L
AST SERPL-CCNC: 19 U/L
BASOPHILS # BLD AUTO: 0.02 K/UL
BASOPHILS NFR BLD: 0.2 %
BILIRUB SERPL-MCNC: 0.5 MG/DL
BUN SERPL-MCNC: 15 MG/DL
CALCIUM SERPL-MCNC: 9.5 MG/DL
CHLORIDE SERPL-SCNC: 111 MMOL/L
CO2 SERPL-SCNC: 27 MMOL/L
CREAT SERPL-MCNC: 1 MG/DL
DIFFERENTIAL METHOD: ABNORMAL
EOSINOPHIL # BLD AUTO: 0.1 K/UL
EOSINOPHIL NFR BLD: 0.5 %
ERYTHROCYTE [DISTWIDTH] IN BLOOD BY AUTOMATED COUNT: 14 %
EST. GFR  (AFRICAN AMERICAN): 56 ML/MIN/1.73 M^2
EST. GFR  (NON AFRICAN AMERICAN): 48 ML/MIN/1.73 M^2
ESTIMATED AVG GLUCOSE: 114 MG/DL
GLUCOSE SERPL-MCNC: 110 MG/DL
HBA1C MFR BLD HPLC: 5.6 %
HCT VFR BLD AUTO: 38.8 %
HGB BLD-MCNC: 12.2 G/DL
LYMPHOCYTES # BLD AUTO: 2 K/UL
LYMPHOCYTES NFR BLD: 20.1 %
MCH RBC QN AUTO: 28.8 PG
MCHC RBC AUTO-ENTMCNC: 31.4 G/DL
MCV RBC AUTO: 92 FL
MONOCYTES # BLD AUTO: 0.5 K/UL
MONOCYTES NFR BLD: 5.5 %
NEUTROPHILS # BLD AUTO: 7.2 K/UL
NEUTROPHILS NFR BLD: 73.6 %
PLATELET # BLD AUTO: 202 K/UL
PMV BLD AUTO: 10.3 FL
POTASSIUM SERPL-SCNC: 4 MMOL/L
PROT SERPL-MCNC: 6.9 G/DL
RBC # BLD AUTO: 4.24 M/UL
SODIUM SERPL-SCNC: 144 MMOL/L
WBC # BLD AUTO: 9.81 K/UL

## 2019-02-26 PROCEDURE — 99214 OFFICE O/P EST MOD 30 MIN: CPT | Mod: S$PBB,,, | Performed by: INTERNAL MEDICINE

## 2019-02-26 PROCEDURE — 99214 OFFICE O/P EST MOD 30 MIN: CPT | Mod: PBBFAC | Performed by: INTERNAL MEDICINE

## 2019-02-26 PROCEDURE — 36415 COLL VENOUS BLD VENIPUNCTURE: CPT

## 2019-02-26 PROCEDURE — 99999 PR PBB SHADOW E&M-EST. PATIENT-LVL IV: ICD-10-PCS | Mod: PBBFAC,,, | Performed by: INTERNAL MEDICINE

## 2019-02-26 PROCEDURE — 80053 COMPREHEN METABOLIC PANEL: CPT

## 2019-02-26 PROCEDURE — 83036 HEMOGLOBIN GLYCOSYLATED A1C: CPT

## 2019-02-26 PROCEDURE — 99999 PR PBB SHADOW E&M-EST. PATIENT-LVL IV: CPT | Mod: PBBFAC,,, | Performed by: INTERNAL MEDICINE

## 2019-02-26 PROCEDURE — 85025 COMPLETE CBC W/AUTO DIFF WBC: CPT

## 2019-02-26 PROCEDURE — 99214 PR OFFICE/OUTPT VISIT, EST, LEVL IV, 30-39 MIN: ICD-10-PCS | Mod: S$PBB,,, | Performed by: INTERNAL MEDICINE

## 2019-02-26 NOTE — PROGRESS NOTES
Subjective:      Patient ID: Audrey Lama is a 89 y.o. female.    Chief Complaint: Follow-up    HPI:  HPI   Patient comes in for follow up of medical problems and also assessment of Home Health which is helping. Her daughter brings her in today.  Diabetes Management Status    Statin: Taking  ACE/ARB: Taking    Screening or Prevention Patient's value Goal Complete/Controlled?   HgA1C Testing and Control   Lab Results   Component Value Date    HGBA1C 5.6 02/26/2019      Annually/Less than 8% Yes   Lipid profile : 10/11/2018 Annually Yes   LDL control Lab Results   Component Value Date    LDLCALC 139.8 10/11/2018    Annually/Less than 100 mg/dl  No   Nephropathy screening Lab Results   Component Value Date    LABMICR 3.9 04/23/2009     Lab Results   Component Value Date    PROTEINUA Negative 02/09/2019    Annually Yes   Blood pressure BP Readings from Last 1 Encounters:   02/26/19 138/74    Less than 140/90 Yes   Dilated retinal exam : 11/15/2016 Annually Yes   Foot exam   Most Recent Foot Exam Date: Not Found Annually No       Patient Active Problem List   Diagnosis    Macular degeneration - Both Eyes    Type II diabetes mellitus    Hypertension    Hyperlipidemia    Ovarian cancer    Pseudophakia    Posterior vitreous detachment    Depression    Diabetes type 2, controlled     Past Medical History:   Diagnosis Date    Diabetes mellitus     Hypertension     Macular degeneration     Squamous cell carcinoma      Past Surgical History:   Procedure Laterality Date    CATARACT EXTRACTION       Family History   Problem Relation Age of Onset    Amblyopia Sister     Hypertension Sister     Cancer Son     Heart attack Son     Blindness Neg Hx     Cataracts Neg Hx     Diabetes Neg Hx     Glaucoma Neg Hx     Macular degeneration Neg Hx     Retinal detachment Neg Hx     Strabismus Neg Hx     Stroke Neg Hx     Thyroid disease Neg Hx      Review of Systems   Constitutional: Negative for chills, fever  "and unexpected weight change.   HENT: Negative for trouble swallowing.    Respiratory: Negative for cough, shortness of breath and wheezing.    Cardiovascular: Negative for chest pain and palpitations.   Gastrointestinal: Negative for abdominal distention, abdominal pain, blood in stool and vomiting.   Musculoskeletal: Negative for back pain.     Objective:     Vitals:    02/26/19 1502   BP: 138/74   Pulse: 69   SpO2: 96%   Weight: 72.6 kg (160 lb)   Height: 5' 4" (1.626 m)   PainSc: 0-No pain     Body mass index is 27.46 kg/m².  Physical Exam   Constitutional: She is oriented to person, place, and time. She appears well-developed and well-nourished. No distress.   Neck: Carotid bruit is not present. No thyromegaly present.   Cardiovascular: Normal rate, regular rhythm and normal heart sounds. PMI is not displaced.   Pulmonary/Chest: Effort normal and breath sounds normal. No respiratory distress.   Abdominal: Soft. Bowel sounds are normal. She exhibits no distension. There is no tenderness.   Musculoskeletal: She exhibits no edema.   Neurological: She is alert and oriented to person, place, and time.     Assessment:     1. Essential hypertension    2. Pure hypercholesterolemia    3. Controlled type 2 diabetes mellitus with stage 2 chronic kidney disease, without long-term current use of insulin    4. Need for follow-up by home health service      Plan:   Audrey was seen today for follow-up.    Diagnoses and all orders for this visit:    Essential hypertension  Comments:  Same medications, monitor every 6 months    Pure hypercholesterolemia  Comments:  On statin     Controlled type 2 diabetes mellitus with stage 2 chronic kidney disease, without long-term current use of insulin  Comments:  Monitor every 6 months, acceptable control  Orders:  -     CBC auto differential; Future  -     Comprehensive metabolic panel; Future  -     Hemoglobin A1c; Future    Need for follow-up by home health service  Comments:  Home health " follow up and benefitting from care        Problem List Items Addressed This Visit     Hypertension - Primary    Hyperlipidemia    Diabetes type 2, controlled    Relevant Orders    CBC auto differential (Completed)    Comprehensive metabolic panel (Completed)    Hemoglobin A1c (Completed)      Other Visit Diagnoses     Need for follow-up by home health service        Home health follow up and benefitting from care        Orders Placed This Encounter   Procedures    CBC auto differential     Standing Status:   Future     Number of Occurrences:   1     Standing Expiration Date:   4/27/2019    Comprehensive metabolic panel     Standing Status:   Future     Number of Occurrences:   1     Standing Expiration Date:   4/27/2019    Hemoglobin A1c     Standing Status:   Future     Number of Occurrences:   1     Standing Expiration Date:   4/27/2019     Follow-up in about 6 months (around 8/26/2019) for Follow up.     Medication List           Accurate as of 2/26/19 11:59 PM. If you have any questions, ask your nurse or doctor.               CONTINUE taking these medications    amLODIPine 10 MG tablet  Commonly known as:  NORVASC  Take 1 tablet (10 mg total) by mouth once daily.     ANTIOXIDANT ORAL     aspirin 81 mg Tab     atorvastatin 40 MG tablet  Commonly known as:  LIPITOR  Take 1 tablet (40 mg total) by mouth once daily.     fluorouracil 5 % cream  Commonly known as:  EFUDEX  AAA on left arm bid x 2-4 weeks     losartan 100 MG tablet  Commonly known as:  COZAAR  Take 1 tablet (100 mg total) by mouth once daily.     LUTEIN ORAL     metFORMIN 500 MG 24 hr tablet  Commonly known as:  GLUCOPHAGE-XR  Take 1 tablet (500 mg total) by mouth every evening.     metoprolol succinate 50 MG 24 hr tablet  Commonly known as:  TOPROL XL  Take 1 tablet (50 mg total) by mouth once daily.     mirtazapine 15 MG tablet  Commonly known as:  REMERON  Take 1 tablet (15 mg total) by mouth every evening.        STOP taking these medications     nitrofurantoin (macrocrystal-monohydrate) 100 MG capsule  Commonly known as:  MACROBID  Stopped by:  Jia Borjas MD

## 2019-02-28 ENCOUNTER — TELEPHONE (OUTPATIENT)
Dept: ADMINISTRATIVE | Facility: CLINIC | Age: 84
End: 2019-02-28

## 2019-03-12 ENCOUNTER — PATIENT MESSAGE (OUTPATIENT)
Dept: INTERNAL MEDICINE | Facility: CLINIC | Age: 84
End: 2019-03-12

## 2019-03-13 ENCOUNTER — PATIENT MESSAGE (OUTPATIENT)
Dept: INTERNAL MEDICINE | Facility: CLINIC | Age: 84
End: 2019-03-13

## 2019-03-16 ENCOUNTER — PATIENT MESSAGE (OUTPATIENT)
Dept: INTERNAL MEDICINE | Facility: CLINIC | Age: 84
End: 2019-03-16

## 2019-05-07 ENCOUNTER — PATIENT MESSAGE (OUTPATIENT)
Dept: INTERNAL MEDICINE | Facility: CLINIC | Age: 84
End: 2019-05-07

## 2019-05-07 DIAGNOSIS — Z11.1 TUBERCULOSIS SCREENING: Primary | ICD-10-CM

## 2019-05-08 ENCOUNTER — TELEPHONE (OUTPATIENT)
Dept: INTERNAL MEDICINE | Facility: CLINIC | Age: 84
End: 2019-05-08

## 2019-05-08 NOTE — TELEPHONE ENCOUNTER
Spoke with POA---didn't received paperwork from [facility]---will get them to refax paperwork.

## 2019-05-08 NOTE — TELEPHONE ENCOUNTER
----- Message from Tonia Van sent at 5/8/2019  2:38 PM CDT -----  Contact: [facility]/[place]/[phone]  Pt power of  called in regards to checking the status of her [employer] paper work. It was faxed over on yesterday. They need it back ASAP.       Please advise

## 2019-05-09 ENCOUNTER — TELEPHONE (OUTPATIENT)
Dept: INTERNAL MEDICINE | Facility: CLINIC | Age: 84
End: 2019-05-09

## 2019-05-09 NOTE — TELEPHONE ENCOUNTER
Paperwork have been completed, signed and faxed to [facility]    Vaibhav Padgett. Left message for pt to call the office back.

## 2019-05-09 NOTE — TELEPHONE ENCOUNTER
----- Message from Nisha Guzmán sent at 5/9/2019  9:10 AM CDT -----  Contact: Christianne Padgett [phone]  Have you received the latest fax from yesterday after 3:00pm    When can response be expected?   Patient is scheduled to be admitted tomorrow.    Requesting a call back

## 2019-05-15 ENCOUNTER — PATIENT MESSAGE (OUTPATIENT)
Dept: INTERNAL MEDICINE | Facility: CLINIC | Age: 84
End: 2019-05-15

## 2019-05-17 ENCOUNTER — TELEPHONE (OUTPATIENT)
Dept: INTERNAL MEDICINE | Facility: CLINIC | Age: 84
End: 2019-05-17

## 2019-05-17 NOTE — TELEPHONE ENCOUNTER
----- Message from Sena Sprague sent at 5/17/2019 11:17 AM CDT -----  Contact: Fiordaliza [phone]  Fiordaliza with [facility] is requesting update on  history and physical forms for pt that were faxed on May 14 to move into assisted living.     Fax [phone]

## 2019-05-17 NOTE — TELEPHONE ENCOUNTER
Left message for Fiordaliza at [facility] assisted living to return office call.

## 2019-05-21 ENCOUNTER — TELEPHONE (OUTPATIENT)
Dept: INTERNAL MEDICINE | Facility: CLINIC | Age: 84
End: 2019-05-21

## 2019-05-21 NOTE — TELEPHONE ENCOUNTER
----- Message from Blue Diaz sent at 5/21/2019 12:33 PM CDT -----  Contact: Fiordaliza  [phone]  Type: Returning a call    Who left a message? Fiordaliza     When did the practice call? 05/21    Comments: please call and advise, Thanks

## 2019-05-21 NOTE — TELEPHONE ENCOUNTER
----- Message from Blue Diaz sent at 5/21/2019 12:33 PM CDT -----  Contact: Fiordaliza  [phone]  Type: Returning a call    Who left a message? Fiordaliza     When did the practice call? 05/21    Comments: please call and advise, Thanks

## 2019-05-22 NOTE — TELEPHONE ENCOUNTER
Faxed over the requested paperwork including the last clinical note and snapshot to [facility]

## 2019-05-23 ENCOUNTER — TELEPHONE (OUTPATIENT)
Dept: INTERNAL MEDICINE | Facility: CLINIC | Age: 84
End: 2019-05-23

## 2019-05-23 NOTE — TELEPHONE ENCOUNTER
----- Message from Hardy Mercado sent at 5/23/2019  9:59 AM CDT -----  Contact: Fiordaliza/[facility]/[phone]  Office is calling about the forms filled out for the pt. They state one of them has no signature at all. They will be faxing it over again for the signatures. Please call and advise.      Thank You

## 2019-05-27 ENCOUNTER — OFFICE VISIT (OUTPATIENT)
Dept: INTERNAL MEDICINE | Facility: CLINIC | Age: 84
End: 2019-05-27
Payer: MEDICARE

## 2019-05-27 ENCOUNTER — PATIENT MESSAGE (OUTPATIENT)
Dept: INTERNAL MEDICINE | Facility: CLINIC | Age: 84
End: 2019-05-27

## 2019-05-27 VITALS
DIASTOLIC BLOOD PRESSURE: 60 MMHG | OXYGEN SATURATION: 96 % | HEIGHT: 64 IN | BODY MASS INDEX: 26.91 KG/M2 | HEART RATE: 68 BPM | SYSTOLIC BLOOD PRESSURE: 154 MMHG | WEIGHT: 157.63 LBS

## 2019-05-27 DIAGNOSIS — E78.00 PURE HYPERCHOLESTEROLEMIA: ICD-10-CM

## 2019-05-27 DIAGNOSIS — E11.22 CONTROLLED TYPE 2 DIABETES MELLITUS WITH STAGE 3 CHRONIC KIDNEY DISEASE, WITHOUT LONG-TERM CURRENT USE OF INSULIN: ICD-10-CM

## 2019-05-27 DIAGNOSIS — I10 ESSENTIAL HYPERTENSION: Primary | ICD-10-CM

## 2019-05-27 DIAGNOSIS — N18.30 CONTROLLED TYPE 2 DIABETES MELLITUS WITH STAGE 3 CHRONIC KIDNEY DISEASE, WITHOUT LONG-TERM CURRENT USE OF INSULIN: ICD-10-CM

## 2019-05-27 DIAGNOSIS — F32.0 CURRENT MILD EPISODE OF MAJOR DEPRESSIVE DISORDER WITHOUT PRIOR EPISODE: ICD-10-CM

## 2019-05-27 PROBLEM — E11.9 CONTROLLED TYPE 2 DIABETES MELLITUS, WITHOUT LONG-TERM CURRENT USE OF INSULIN: Status: ACTIVE | Noted: 2019-05-27

## 2019-05-27 PROCEDURE — 99214 OFFICE O/P EST MOD 30 MIN: CPT | Mod: S$PBB,,, | Performed by: INTERNAL MEDICINE

## 2019-05-27 PROCEDURE — 99999 PR PBB SHADOW E&M-EST. PATIENT-LVL III: CPT | Mod: PBBFAC,,, | Performed by: INTERNAL MEDICINE

## 2019-05-27 PROCEDURE — 99999 PR PBB SHADOW E&M-EST. PATIENT-LVL III: ICD-10-PCS | Mod: PBBFAC,,, | Performed by: INTERNAL MEDICINE

## 2019-05-27 PROCEDURE — 99214 PR OFFICE/OUTPT VISIT, EST, LEVL IV, 30-39 MIN: ICD-10-PCS | Mod: S$PBB,,, | Performed by: INTERNAL MEDICINE

## 2019-05-27 PROCEDURE — 99213 OFFICE O/P EST LOW 20 MIN: CPT | Mod: PBBFAC | Performed by: INTERNAL MEDICINE

## 2019-05-27 NOTE — PROGRESS NOTES
Subjective:      Patient ID: Audrey Lama is a 89 y.o. female.    Chief Complaint: Follow-up    HPI:  HPI   Patient is here to fill out a form for independent living at [facility].  Form filled out with the patient and her daughter.  Medications reviewed and adjusted  Diabetes Management Status    Statin: Taking  ACE/ARB: Taking    Screening or Prevention Patient's value Goal Complete/Controlled?   HgA1C Testing and Control   Lab Results   Component Value Date    HGBA1C 5.6 02/26/2019      Annually/Less than 8% Yes   Lipid profile : 10/11/2018 Annually Yes   LDL control Lab Results   Component Value Date    LDLCALC 139.8 10/11/2018    Annually/Less than 100 mg/dl  No   Nephropathy screening Lab Results   Component Value Date    LABMICR 3.9 04/23/2009     Lab Results   Component Value Date    PROTEINUA Negative 02/09/2019    Annually Yes   Blood pressure BP Readings from Last 1 Encounters:   05/27/19 (!) 154/60    Less than 140/90 No   Dilated retinal exam : 11/15/2016 Annually Yes   Foot exam   Most Recent Foot Exam Date: Not Found Annually No       Patient Active Problem List   Diagnosis    Macular degeneration - Both Eyes    Hypertension    Hyperlipidemia    Ovarian cancer    Pseudophakia    Posterior vitreous detachment    Depression    Controlled type 2 diabetes mellitus, without long-term current use of insulin     Past Medical History:   Diagnosis Date    Diabetes mellitus     Hypertension     Macular degeneration     Squamous cell carcinoma      Past Surgical History:   Procedure Laterality Date    CATARACT EXTRACTION       Family History   Problem Relation Age of Onset    Amblyopia Sister     Hypertension Sister     Cancer Son     Heart attack Son     Blindness Neg Hx     Cataracts Neg Hx     Diabetes Neg Hx     Glaucoma Neg Hx     Macular degeneration Neg Hx     Retinal detachment Neg Hx     Strabismus Neg Hx     Stroke Neg Hx     Thyroid disease Neg Hx      Review of  "Systems   Constitutional: Positive for activity change. Negative for unexpected weight change.   HENT: Positive for hearing loss. Negative for rhinorrhea and trouble swallowing.    Eyes: Positive for visual disturbance. Negative for discharge.   Respiratory: Negative for chest tightness and wheezing.    Cardiovascular: Positive for palpitations. Negative for chest pain.   Gastrointestinal: Positive for constipation and diarrhea. Negative for blood in stool and vomiting.   Endocrine: Positive for polyuria. Negative for polydipsia.   Genitourinary: Negative for difficulty urinating, dysuria, hematuria and menstrual problem.   Musculoskeletal: Positive for arthralgias. Negative for joint swelling and neck pain.   Neurological: Positive for weakness and headaches.   Psychiatric/Behavioral: Positive for confusion and dysphoric mood.   Reviewed, no new problems  Objective:     Vitals:    05/27/19 1314   BP: (!) 154/60   Pulse: 68   SpO2: 96%   Weight: 71.5 kg (157 lb 10.1 oz)   Height: 5' 4" (1.626 m)   PainSc: 0-No pain     Body mass index is 27.06 kg/m².  Physical Exam   Constitutional: She is oriented to person, place, and time. She appears well-developed and well-nourished. No distress.   Neck: Carotid bruit is not present. No thyromegaly present.   Cardiovascular: Normal rate, regular rhythm and normal heart sounds. PMI is not displaced.   Pulmonary/Chest: Effort normal and breath sounds normal. No respiratory distress.   Abdominal: Soft. Bowel sounds are normal. She exhibits no distension. There is no tenderness.   Musculoskeletal: She exhibits no edema.   Neurological: She is alert and oriented to person, place, and time.     Assessment:     1. Essential hypertension    2. Controlled type 2 diabetes mellitus with stage 3 chronic kidney disease, without long-term current use of insulin    3. Current mild episode of major depressive disorder without prior episode    4. Pure hypercholesterolemia      Plan:   Audrey was " seen today for follow-up.    Diagnoses and all orders for this visit:  Patient is most significant problems 10 to be her eyesight as well as her hearing.  I do believe she is appropriate for independent living.    Essential hypertension:  After recheck blood pressure I believe it is appropriate for this age.    Controlled type 2 diabetes mellitus with stage 3 chronic kidney disease, without long-term current use of insulin:  I have reduced the patient's metformin by 1/2    Current mild episode of major depressive disorder without prior episode:  Continue Remeron    Pure hypercholesterolemia:  Continue statin        Problem List Items Addressed This Visit     Hypertension - Primary    Hyperlipidemia    Depression    Controlled type 2 diabetes mellitus, without long-term current use of insulin        No orders of the defined types were placed in this encounter.    No follow-ups on file.     Medication List           Accurate as of 5/27/19  1:33 PM. If you have any questions, ask your nurse or doctor.               CHANGE how you take these medications    metFORMIN 500 MG 24 hr tablet  Commonly known as:  GLUCOPHAGE-XR  Take 1 tablet (500 mg total) by mouth every evening.  What changed:  how much to take        CONTINUE taking these medications    amLODIPine 10 MG tablet  Commonly known as:  NORVASC  Take 1 tablet (10 mg total) by mouth once daily.     ANTIOXIDANT ORAL     aspirin 81 mg Tab     atorvastatin 40 MG tablet  Commonly known as:  LIPITOR  Take 1 tablet (40 mg total) by mouth once daily.     fluorouracil 5 % cream  Commonly known as:  EFUDEX  AAA on left arm bid x 2-4 weeks     losartan 100 MG tablet  Commonly known as:  COZAAR  Take 1 tablet (100 mg total) by mouth once daily.     LUTEIN ORAL     metoprolol succinate 50 MG 24 hr tablet  Commonly known as:  TOPROL XL  Take 1 tablet (50 mg total) by mouth once daily.     mirtazapine 15 MG tablet  Commonly known as:  REMERON  Take 1 tablet (15 mg total) by  mouth every evening.

## 2019-05-28 ENCOUNTER — PATIENT MESSAGE (OUTPATIENT)
Dept: INTERNAL MEDICINE | Facility: CLINIC | Age: 84
End: 2019-05-28

## 2019-05-28 RX ORDER — METFORMIN HYDROCHLORIDE 500 MG/1
250 TABLET ORAL
Qty: 15 TABLET | Refills: 12 | Status: SHIPPED | OUTPATIENT
Start: 2019-05-28 | End: 2020-06-18 | Stop reason: SDUPTHER

## 2019-05-28 RX ORDER — MIRTAZAPINE 15 MG/1
15 TABLET, FILM COATED ORAL NIGHTLY
Qty: 30 TABLET | Refills: 12 | Status: SHIPPED | OUTPATIENT
Start: 2019-05-28 | End: 2020-06-18 | Stop reason: SDUPTHER

## 2019-05-28 RX ORDER — ATORVASTATIN CALCIUM 40 MG/1
40 TABLET, FILM COATED ORAL NIGHTLY
Qty: 30 TABLET | Refills: 12 | Status: SHIPPED | OUTPATIENT
Start: 2019-05-28 | End: 2020-06-18 | Stop reason: SDUPTHER

## 2019-05-28 RX ORDER — METOPROLOL SUCCINATE 50 MG/1
50 TABLET, EXTENDED RELEASE ORAL DAILY
Qty: 30 TABLET | Refills: 12 | Status: SHIPPED | OUTPATIENT
Start: 2019-05-28 | End: 2020-06-18 | Stop reason: SDUPTHER

## 2019-05-28 RX ORDER — AMLODIPINE BESYLATE 10 MG/1
10 TABLET ORAL DAILY
Qty: 30 TABLET | Refills: 12 | Status: SHIPPED | OUTPATIENT
Start: 2019-05-28 | End: 2020-06-18 | Stop reason: SDUPTHER

## 2019-05-28 RX ORDER — LOSARTAN POTASSIUM 100 MG/1
100 TABLET ORAL DAILY
Qty: 30 TABLET | Refills: 12 | Status: SHIPPED | OUTPATIENT
Start: 2019-05-28 | End: 2020-06-18 | Stop reason: SDUPTHER

## 2019-05-28 RX ORDER — METOPROLOL SUCCINATE 50 MG/1
50 TABLET, EXTENDED RELEASE ORAL DAILY
Qty: 90 TABLET | Refills: 3 | Status: SHIPPED | OUTPATIENT
Start: 2019-05-28 | End: 2019-05-28 | Stop reason: SDUPTHER

## 2019-05-28 NOTE — TELEPHONE ENCOUNTER
Please call [facility]    Please ask if the patients lutein , aspirin and beta carotene have to ordered through [facility].    If it does I will need to know the mg amount of lutein, and the precise oral antioxidant that is being used.

## 2019-05-29 RX ORDER — LUTEIN 10 MG
1 TABLET ORAL DAILY
Qty: 30 EACH | Refills: 12 | COMMUNITY
Start: 2019-05-29 | End: 2023-01-30 | Stop reason: CLARIF

## 2019-05-29 RX ORDER — LOPERAMIDE HYDROCHLORIDE 2 MG/1
2 CAPSULE ORAL 2 TIMES DAILY PRN
Qty: 10 CAPSULE | Refills: 1 | Status: SHIPPED | OUTPATIENT
Start: 2019-05-29 | End: 2020-12-07

## 2019-05-29 RX ORDER — NAPROXEN SODIUM 220 MG
220 TABLET ORAL 2 TIMES DAILY WITH MEALS
Qty: 60 TABLET | Refills: 0 | COMMUNITY
Start: 2019-05-29 | End: 2022-11-09

## 2019-05-29 RX ORDER — ASPIRIN 81 MG/1
81 TABLET ORAL DAILY
Qty: 30 TABLET | Refills: 12 | COMMUNITY
Start: 2019-05-29 | End: 2020-05-28

## 2019-05-29 NOTE — TELEPHONE ENCOUNTER
Spoke with Nurse Chairez at [facility] and stated yes the lutein, aspirin, and oral antioxidant have to go through [facility]. Nurse stated the paperwork the daughter gave them did not have mg of Lutein and no precise amount of the oral antioxidant.  Dex requested PRN for pain, diarrhea, and headaches in case pt needs it.     I called pt daughter and she stated she does not have the bottles any more and asked if it can be the standard Rxs.  Also an prescription of aleeve for when pt hip is hurting her.

## 2019-11-14 ENCOUNTER — PATIENT MESSAGE (OUTPATIENT)
Dept: INTERNAL MEDICINE | Facility: CLINIC | Age: 84
End: 2019-11-14

## 2019-11-18 ENCOUNTER — PATIENT MESSAGE (OUTPATIENT)
Dept: INTERNAL MEDICINE | Facility: CLINIC | Age: 84
End: 2019-11-18

## 2019-11-19 ENCOUNTER — TELEPHONE (OUTPATIENT)
Dept: OPHTHALMOLOGY | Facility: CLINIC | Age: 84
End: 2019-11-19

## 2019-12-09 ENCOUNTER — PATIENT MESSAGE (OUTPATIENT)
Dept: OPHTHALMOLOGY | Facility: CLINIC | Age: 84
End: 2019-12-09

## 2019-12-13 ENCOUNTER — PATIENT OUTREACH (OUTPATIENT)
Dept: ADMINISTRATIVE | Facility: OTHER | Age: 84
End: 2019-12-13

## 2019-12-13 DIAGNOSIS — N18.30 CONTROLLED TYPE 2 DIABETES MELLITUS WITH STAGE 3 CHRONIC KIDNEY DISEASE, WITHOUT LONG-TERM CURRENT USE OF INSULIN: Primary | ICD-10-CM

## 2019-12-13 DIAGNOSIS — E11.22 CONTROLLED TYPE 2 DIABETES MELLITUS WITH STAGE 3 CHRONIC KIDNEY DISEASE, WITHOUT LONG-TERM CURRENT USE OF INSULIN: Primary | ICD-10-CM

## 2019-12-16 ENCOUNTER — OFFICE VISIT (OUTPATIENT)
Dept: OPHTHALMOLOGY | Facility: CLINIC | Age: 84
End: 2019-12-16
Payer: MEDICARE

## 2019-12-16 VITALS — HEART RATE: 64 BPM | DIASTOLIC BLOOD PRESSURE: 59 MMHG | SYSTOLIC BLOOD PRESSURE: 135 MMHG

## 2019-12-16 DIAGNOSIS — H43.813 PVD (POSTERIOR VITREOUS DETACHMENT), BOTH EYES: ICD-10-CM

## 2019-12-16 DIAGNOSIS — H35.3134 ADVANCED ATROPHIC NONEXUDATIVE AGE-RELATED MACULAR DEGENERATION OF BOTH EYES WITH SUBFOVEAL INVOLVEMENT: Primary | ICD-10-CM

## 2019-12-16 PROCEDURE — 92225 PR SPECIAL EYE EXAM, INITIAL: CPT | Mod: 50,PBBFAC | Performed by: OPHTHALMOLOGY

## 2019-12-16 PROCEDURE — 92004 COMPRE OPH EXAM NEW PT 1/>: CPT | Mod: S$PBB,,, | Performed by: OPHTHALMOLOGY

## 2019-12-16 PROCEDURE — 99999 PR PBB SHADOW E&M-EST. PATIENT-LVL III: CPT | Mod: PBBFAC,,, | Performed by: OPHTHALMOLOGY

## 2019-12-16 PROCEDURE — 92004 PR EYE EXAM, NEW PATIENT,COMPREHESV: ICD-10-PCS | Mod: S$PBB,,, | Performed by: OPHTHALMOLOGY

## 2019-12-16 PROCEDURE — 99999 PR PBB SHADOW E&M-EST. PATIENT-LVL III: ICD-10-PCS | Mod: PBBFAC,,, | Performed by: OPHTHALMOLOGY

## 2019-12-16 PROCEDURE — 92225 PR SPECIAL EYE EXAM, INITIAL: CPT | Mod: 50,S$PBB,, | Performed by: OPHTHALMOLOGY

## 2019-12-16 PROCEDURE — 92134 POSTERIOR SEGMENT OCT RETINA (OCULAR COHERENCE TOMOGRAPHY)-BOTH EYES: ICD-10-PCS | Mod: 26,S$PBB,, | Performed by: OPHTHALMOLOGY

## 2019-12-16 PROCEDURE — 99213 OFFICE O/P EST LOW 20 MIN: CPT | Mod: PBBFAC | Performed by: OPHTHALMOLOGY

## 2019-12-16 PROCEDURE — 92134 CPTRZ OPH DX IMG PST SGM RTA: CPT | Mod: PBBFAC | Performed by: OPHTHALMOLOGY

## 2019-12-16 PROCEDURE — 92225 PR SPECIAL EYE EXAM, INITIAL: ICD-10-PCS | Mod: 50,S$PBB,, | Performed by: OPHTHALMOLOGY

## 2019-12-17 NOTE — PROGRESS NOTES
Subjective:       Patient ID: Audrey Lama is a 89 y.o. female      Chief Complaint   Patient presents with    Macular Degeneration     History of Present Illness  HPI     89 y.o female pt here today for over due fu for macula degeneration  DLS   09/05/2013 by Dr. Andino    Pt along with daughter Noemy states that she is here to re establish care.    Pt states that the last time she saw the doctor was told there is nothing   they can do for the eyes.   No pains,flashes,floaters, or double vision.    No new distortions    Eye med(s) - lutein    Last edited by Farhan Wilson MD on 12/16/2019 10:43 AM. (History)        Imaging:    See report    Assessment/Plan:     1. 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 PVD (posterior vitreous detachment), both eyes  RD precautions    LV eval at [facility]    Follow up in about 1 year (around 12/16/2020), or if symptoms worsen or fail to improve, for Comprehensive Examination, OCT Mac.

## 2019-12-28 ENCOUNTER — PATIENT MESSAGE (OUTPATIENT)
Dept: INTERNAL MEDICINE | Facility: CLINIC | Age: 84
End: 2019-12-28

## 2019-12-28 ENCOUNTER — PATIENT MESSAGE (OUTPATIENT)
Dept: OPHTHALMOLOGY | Facility: CLINIC | Age: 84
End: 2019-12-28

## 2020-01-19 ENCOUNTER — PATIENT MESSAGE (OUTPATIENT)
Dept: INTERNAL MEDICINE | Facility: CLINIC | Age: 85
End: 2020-01-19

## 2020-01-19 ENCOUNTER — PATIENT MESSAGE (OUTPATIENT)
Dept: OPHTHALMOLOGY | Facility: CLINIC | Age: 85
End: 2020-01-19

## 2020-03-08 ENCOUNTER — PATIENT MESSAGE (OUTPATIENT)
Dept: OPHTHALMOLOGY | Facility: CLINIC | Age: 85
End: 2020-03-08

## 2020-05-21 ENCOUNTER — OFFICE VISIT (OUTPATIENT)
Dept: INTERNAL MEDICINE | Facility: CLINIC | Age: 85
End: 2020-05-21
Payer: MEDICARE

## 2020-05-21 ENCOUNTER — TELEPHONE (OUTPATIENT)
Dept: ORTHOPEDICS | Facility: CLINIC | Age: 85
End: 2020-05-21

## 2020-05-21 ENCOUNTER — PATIENT MESSAGE (OUTPATIENT)
Dept: WOUND CARE | Facility: CLINIC | Age: 85
End: 2020-05-21

## 2020-05-21 ENCOUNTER — PATIENT MESSAGE (OUTPATIENT)
Dept: INTERNAL MEDICINE | Facility: CLINIC | Age: 85
End: 2020-05-21

## 2020-05-21 ENCOUNTER — TELEPHONE (OUTPATIENT)
Dept: INTERNAL MEDICINE | Facility: CLINIC | Age: 85
End: 2020-05-21

## 2020-05-21 VITALS
WEIGHT: 169.75 LBS | BODY MASS INDEX: 28.98 KG/M2 | SYSTOLIC BLOOD PRESSURE: 124 MMHG | HEIGHT: 64 IN | DIASTOLIC BLOOD PRESSURE: 78 MMHG | OXYGEN SATURATION: 97 %

## 2020-05-21 DIAGNOSIS — Z51.89 VISIT FOR WOUND CARE: Primary | ICD-10-CM

## 2020-05-21 DIAGNOSIS — N18.2 CONTROLLED TYPE 2 DIABETES MELLITUS WITH STAGE 2 CHRONIC KIDNEY DISEASE, WITHOUT LONG-TERM CURRENT USE OF INSULIN: Primary | ICD-10-CM

## 2020-05-21 DIAGNOSIS — L98.499 SKIN ULCER, UNSPECIFIED ULCER STAGE: Primary | ICD-10-CM

## 2020-05-21 DIAGNOSIS — E11.22 CONTROLLED TYPE 2 DIABETES MELLITUS WITH STAGE 2 CHRONIC KIDNEY DISEASE, WITHOUT LONG-TERM CURRENT USE OF INSULIN: Primary | ICD-10-CM

## 2020-05-21 PROCEDURE — 99215 OFFICE O/P EST HI 40 MIN: CPT | Mod: PBBFAC | Performed by: INTERNAL MEDICINE

## 2020-05-21 PROCEDURE — 99999 PR PBB SHADOW E&M-EST. PATIENT-LVL V: CPT | Mod: PBBFAC,,, | Performed by: INTERNAL MEDICINE

## 2020-05-21 PROCEDURE — 99999 PR PBB SHADOW E&M-EST. PATIENT-LVL V: ICD-10-PCS | Mod: PBBFAC,,, | Performed by: INTERNAL MEDICINE

## 2020-05-21 PROCEDURE — 99213 OFFICE O/P EST LOW 20 MIN: CPT | Mod: S$PBB,,, | Performed by: INTERNAL MEDICINE

## 2020-05-21 PROCEDURE — 99213 PR OFFICE/OUTPT VISIT, EST, LEVL III, 20-29 MIN: ICD-10-PCS | Mod: S$PBB,,, | Performed by: INTERNAL MEDICINE

## 2020-05-21 NOTE — PROGRESS NOTES
Subjective:       Patient ID: Audrey Lama is a 89 y.o. female.    Chief Complaint: Arm Pain    HPI   Here with daughter.  Lives at [facility].  She has skin lesion LUE, that won't heal.  Present for 2-3 years (?).   She has a h/o skin cancer and daughter was worried that this might be a skin cancer also.    Review of Systems   Constitutional: Negative for fever and unexpected weight change.   HENT: Positive for hearing loss. Negative for congestion and postnasal drip.    Eyes: Negative for pain, discharge and visual disturbance.   Respiratory: Negative for cough, chest tightness, shortness of breath and wheezing.    Cardiovascular: Negative for chest pain and leg swelling.   Gastrointestinal: Negative for abdominal pain, constipation, diarrhea and nausea.   Genitourinary: Negative for difficulty urinating, dysuria and hematuria.   Skin: Positive for wound. Negative for rash.   Neurological: Negative for headaches.   Psychiatric/Behavioral: Negative for dysphoric mood and sleep disturbance. The patient is not nervous/anxious.        Objective:      Physical Exam    2.5 x 2.5 superficial ulcerated area r forearm, without drainage.          Assessment:       1. Skin ulcer, unspecified ulcer stage        Plan:       Audrey was seen today for arm pain.    Diagnoses and all orders for this visit:    Skin ulcer, unspecified ulcer stage  -     Ambulatory referral/consult to Wound Clinic; Future

## 2020-05-21 NOTE — TELEPHONE ENCOUNTER
Spoke to patient's mother and they did not call for a Spine appointment, this message was sent erroneously by phone staff.    ----- Message from Kathie Singh sent at 5/21/2020 12:24 PM CDT -----  Please contact a for a appointment for a appointment for Spine Surgery.

## 2020-05-21 NOTE — TELEPHONE ENCOUNTER
Daughter parker stated she hasn't seen the pt feet in 30 days since the quarantine. Stated pt is due for a foot exam and wanted her nails clipped being that she has diabetes she wanted someone to check her feet. Daughter is ok with sending the NP out to [place].

## 2020-05-21 NOTE — TELEPHONE ENCOUNTER
----- Message from Luz Maria Johnston sent at 5/21/2020  3:18 PM CDT -----  Contact: Daughter - Zoya Jasso is requesting orders for home health for her mother, Audrey.    She would like someone to visit my mother at [facility] possibly with a home health nurse to check out her feet.    Please advise if a Podiatry consult is needed as well.    Ms. Jasso can be reached at [phone].  Ms. Lama can be reached at [phone].    Thanks.

## 2020-05-21 NOTE — TELEPHONE ENCOUNTER
Can you call and see what the problem with the feet is.    I cannot send a podiatrist although they may have one that goes to the facility    I am able to send a nurse practitioner.

## 2020-05-22 NOTE — TELEPHONE ENCOUNTER
Spoke with daughter parker. Informed her that Dr. Borjas put a referral in to podiatry and also she can checked with the facility to see if they have a podiatrist who comes to the facility. Daughter verbally understood and stated she will check with the facility.

## 2020-05-27 ENCOUNTER — PATIENT MESSAGE (OUTPATIENT)
Dept: INTERNAL MEDICINE | Facility: CLINIC | Age: 85
End: 2020-05-27

## 2020-06-01 ENCOUNTER — TELEPHONE (OUTPATIENT)
Dept: INTERNAL MEDICINE | Facility: CLINIC | Age: 85
End: 2020-06-01

## 2020-06-01 PROCEDURE — G0180 PR HOME HEALTH MD CERTIFICATION: ICD-10-PCS | Mod: ,,, | Performed by: INTERNAL MEDICINE

## 2020-06-01 PROCEDURE — G0180 MD CERTIFICATION HHA PATIENT: HCPCS | Mod: ,,, | Performed by: INTERNAL MEDICINE

## 2020-06-01 NOTE — TELEPHONE ENCOUNTER
----- Message from Jordyn Madsen sent at 6/1/2020  9:47 AM CDT -----  Contact: [place]/[facility]/ [phone]  Evaluation  for wound care is  Cleanse with normal cleanser,cover with border gauze ,  and change it twice a week, wound care , skin tear.

## 2020-06-12 ENCOUNTER — TELEPHONE (OUTPATIENT)
Dept: INTERNAL MEDICINE | Facility: CLINIC | Age: 85
End: 2020-06-12

## 2020-06-12 NOTE — TELEPHONE ENCOUNTER
----- Message from Ninoska Boggs sent at 6/12/2020 12:40 PM CDT -----  Contact: Candy/[facility] [phone]  Per Medicare guidelines patient will need a face to face with you before 07/01/2020. Visit must address the wound to the patients left arm.    Please call and advise.    Thank You

## 2020-06-22 ENCOUNTER — DOCUMENT SCAN (OUTPATIENT)
Dept: HOME HEALTH SERVICES | Facility: HOSPITAL | Age: 85
End: 2020-06-22
Payer: MEDICARE

## 2020-06-22 ENCOUNTER — EXTERNAL HOME HEALTH (OUTPATIENT)
Dept: HOME HEALTH SERVICES | Facility: HOSPITAL | Age: 85
End: 2020-06-22
Payer: MEDICARE

## 2020-06-24 ENCOUNTER — TELEPHONE (OUTPATIENT)
Dept: INTERNAL MEDICINE | Facility: CLINIC | Age: 85
End: 2020-06-24

## 2020-06-24 DIAGNOSIS — C44.609 SKIN CANCER OF ARM, LEFT: Primary | ICD-10-CM

## 2020-06-24 NOTE — TELEPHONE ENCOUNTER
I spoke with Zoya who is the caretaker of her son who has had a serious hospitalization with complication. She is unlikely to be able to be with her mother at the appt. Her mother did not feel home health was helping her.    Malgorzata went to speak with Ms. Ventura, and we will do a facetime tomorrow at 10:00 am. It is suspected that she may have had an underlying BCC or SCC per the notes of 2016 ( of note she did not use the effudex).    My suspicion is that she will need wound care and a biopsy of the area. If biopsy positive: surgery. Wound care wound be just to control the symptoms.      Derm  The story is long: the basics    Patient has had a history of  potential BCC /SCC left arm per Derm that were never treated with the effudex prescribed in 2016    5/21/2020 patient was seen by Sandra Cummings : picture available wound on left arm  Wound care and appt with me canceled by patient and family  Home Care and Home health went out: ultimately dismissed by the patient    I am doing a facetime tomorrow but suspect she will need a biopsy    Is anyone in your system able to do a biopsy of the area before I involve wound care? The  will help get the patient to the appt. She still has all her faculties, diminished hearing but is able to make her own decisions.    I will only need date, time , physician and site.  5/21 note of Dr. Cummings has a picture. I am told much worse now but will see tomorrow.    Thank you ,  Jia

## 2020-06-25 ENCOUNTER — PATIENT OUTREACH (OUTPATIENT)
Dept: ADMINISTRATIVE | Facility: OTHER | Age: 85
End: 2020-06-25

## 2020-06-25 ENCOUNTER — TELEPHONE (OUTPATIENT)
Dept: DERMATOLOGY | Facility: CLINIC | Age: 85
End: 2020-06-25

## 2020-06-25 NOTE — PROGRESS NOTES
Requested updates within Care Everywhere.  Patient's chart was reviewed for overdue ROMULO topics.  Immunizations reconciled.    Orders placed: n/a  Tasked appts: n/a  Labs Linked: n/a

## 2020-06-26 ENCOUNTER — OFFICE VISIT (OUTPATIENT)
Dept: DERMATOLOGY | Facility: CLINIC | Age: 85
End: 2020-06-26
Payer: MEDICARE

## 2020-06-26 ENCOUNTER — TELEPHONE (OUTPATIENT)
Dept: DERMATOLOGY | Facility: CLINIC | Age: 85
End: 2020-06-26

## 2020-06-26 DIAGNOSIS — C44.609 SKIN CANCER OF ARM, LEFT: ICD-10-CM

## 2020-06-26 DIAGNOSIS — D48.5 NEOPLASM OF UNCERTAIN BEHAVIOR OF SKIN: Primary | ICD-10-CM

## 2020-06-26 PROCEDURE — 99212 PR OFFICE/OUTPT VISIT, EST, LEVL II, 10-19 MIN: ICD-10-PCS | Mod: 95,,, | Performed by: DERMATOLOGY

## 2020-06-26 PROCEDURE — 99212 OFFICE O/P EST SF 10 MIN: CPT | Mod: 95,,, | Performed by: DERMATOLOGY

## 2020-06-26 RX ORDER — SILVER SULFADIAZINE 10 G/1000G
CREAM TOPICAL 2 TIMES DAILY
Qty: 50 G | Refills: 2 | Status: SHIPPED | OUTPATIENT
Start: 2020-06-26 | End: 2021-05-11

## 2020-06-26 NOTE — LETTER
June 26, 2020      Jia Borjas MD  [address]  [place] [address]           [facility] Dermatology  [address]  [place] [address]  Phone: [phone]  Fax: [phone]          Patient: Audrey Lama   MR Number: [id number]   YOB: 1925   Date of Visit: 6/26/2020       Dear Dr. Jia Borjas:    Thank you for referring Audrey Lama to me for evaluation. Attached you will find relevant portions of my assessment and plan of care.    If you have questions, please do not hesitate to call me. I look forward to following Audrey Lama along with you.    Sincerely,    Karin Cifuentes MD    Enclosure  CC:  No Recipients    If you would like to receive this communication electronically, please contact [email or web address] or [phone] to request more information on [facility] [employer] access.    For providers and/or their staff who would like to refer a patient to [facility], please contact us through our one-stop-shop provider referral line, [facility] [place], at [phone].    If you feel you have received this communication in error or would no longer like to receive these types of communications, please e-mail [email or web address]

## 2020-06-26 NOTE — PROGRESS NOTES
Subjective:       Patient ID:  Audrey Lama is a 89 y.o. female who presents for No chief complaint on file.    HPI  The patient location is: home  The chief complaint leading to consultation is: skin cancer    Visit type: audiovisual    Face to Face time with patient: 10 min  12 minutes of total time spent on the encounter, which includes face to face time and non-face to face time preparing to see the patient (eg, review of tests), Obtaining and/or reviewing separately obtained history, Documenting clinical information in the electronic or other health record, Independently interpreting results (not separately reported) and communicating results to the patient/family/caregiver, or Care coordination (not separately reported).       Each patient to whom he or she provides medical services by telemedicine is:  (1) informed of the relationship between the physician and patient and the respective role of any other health care provider with respect to management of the patient; and (2) notified that he or she may decline to receive medical services by telemedicine and may withdraw from such care at any time.    Notes: Pt presents today via video visit during Covid-19 pandemic for non-healing lesion on L forearm. She is accompanied by Malgorzata with [facility].  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 It is unclear from the chart if the site was ever treated, and pt can't recall using the Efudex.  Pt states a small black spot remained at the site after she was seen in 2016 which stayed dormant for years but really started to grow over the last 6 months. Oozing started over the last month.  Lesion leaks fluid. No pain or bleeding.     Review of Systems   Constitutional: Negative for fever and chills.   Skin: Positive for rash. Negative for itching.        Objective:    Physical Exam   Constitutional: She appears well-developed and  well-nourished. No distress.   Neurological: She is alert and oriented to person, place, and time. She is not disoriented.   Psychiatric: She has a normal mood and affect.   Skin:   Areas Examined (abnormalities noted in diagram):   LUE Inspection Performed                     Diagram Legend     Erythematous scaling macule/papule c/w actinic keratosis       Vascular papule c/w angioma      Pigmented verrucoid papule/plaque c/w seborrheic keratosis      Yellow umbilicated papule c/w sebaceous hyperplasia      Irregularly shaped tan macule c/w lentigo     1-2 mm smooth white papules consistent with Milia      Movable subcutaneous cyst with punctum c/w epidermal inclusion cyst      Subcutaneous movable cyst c/w pilar cyst      Firm pink to brown papule c/w dermatofibroma      Pedunculated fleshy papule(s) c/w skin tag(s)      Evenly pigmented macule c/w junctional nevus     Mildly variegated pigmented, slightly irregular-bordered macule c/w mildly atypical nevus      Flesh colored to evenly pigmented papule c/w intradermal nevus       Pink pearly papule/plaque c/w basal cell carcinoma      Erythematous hyperkeratotic cursted plaque c/w SCC      Surgical scar with no sign of skin cancer recurrence      Open and closed comedones      Inflammatory papules and pustules      Verrucoid papule consistent consistent with wart     Erythematous eczematous patches and plaques     Dystrophic onycholytic nail with subungual debris c/w onychomycosis     Umbilicated papule    Erythematous-base heme-crusted tan verrucoid plaque consistent with inflamed seborrheic keratosis     Erythematous Silvery Scaling Plaque c/w Psoriasis     See annotation      Assessment / Plan:        Neoplasm of uncertain behavior of skin  Counseled pt and caregiver that the skin cancer likely recurred in this area.  At this point, I'd recommend coming in for a biopsy to see if it is still just located in the top layer of skin or if it is deeper as treatment  recommendations would differ based on this info.  Pt is willing to come in for this - will schedule on a Mon or Wed as this is when she has transportation. They recommended calling [phone] to set up the appt with transportation.  In the meantime, will treat with topical antibiotic in the event that a bacterial superinfection is causing the oozing.  -     silver sulfADIAZINE 1% (SILVADENE) 1 % cream; Apply topically 2 (two) times daily.  Dispense: 50 g; Refill: 2    rtc for biopsy

## 2020-06-26 NOTE — TELEPHONE ENCOUNTER
----- Message from Mihaela Rutledge MD sent at 6/26/2020 10:43 AM CDT -----  Contact: Jia Borjas  K José Needs appt for biopsy. See below. Any provider. REBA  ----- Message -----  From: Jia Borjas MD  Sent: 6/25/2020   2:53 PM CDT  To: Mihaela Rutledge MD    I had a facetime with the patient,, the facility director and nurse at the facility  this morning and she clearly agreed to the biopsy and listening to what needed to be done. In her mind Dr. Loaiza can help her but I told her that the process would be to see the Dermatologist first. I will communicate directly with the head of the facility. She is very hard of hearing but  clear of thought. If there is a date and time I will make sure she is there.     Thank you,  Jia  ----- Message -----  From: Mihaela Rutledge MD  Sent: 6/25/2020   9:52 AM CDT  To: MD Alfa Lowery,    Read over pt's derm interactions and agree area looks like a skin cancer, so can't imagine wound care being of any benefit. I think that before we move [facility] and earth to get this 89 year old cognitively-intact patient into clinic for a biopsy, we need to discern is she is even willing for treatment as she has declined treatment in the past. Let me know. We are happy to see her. REBA  ----- Message -----  From: Jia Borjas MD  Sent: 6/24/2020   5:31 PM CDT  To: Mihaela Rutledge MD    The story is long: the basics    Patient has had a history of  potential BCC /SCC left arm per Derm that were never treated with the effudex prescribed in 2016    5/21/2020 patient was seen by Sandra Cummings : picture available wound on left arm  Wound care and appt with me canceled by patient and family  Home Care and Home health went out: ultimately dismissed by the patient    I am doing a facetime tomorrow but suspect she will need a biopsy    Is anyone in your system able to do a biopsy of the area before I involve wound care? The  will help get the patient to the  donna. She still has all her faculties, diminished hearing but is able to make her own decisions.    I will only need date, time , physician and site.  5/21 note of Dr. Cummings has a picture. I am told much worse now but will see tomorrow.    Thank you ,  Jia Borjas

## 2020-06-26 NOTE — TELEPHONE ENCOUNTER
Called patient to schedule an appointment with Dermatology, per message. No answer. Message was left asking patient to call the office back for an appointment.

## 2020-07-08 ENCOUNTER — OFFICE VISIT (OUTPATIENT)
Dept: DERMATOLOGY | Facility: CLINIC | Age: 85
End: 2020-07-08
Payer: MEDICARE

## 2020-07-08 DIAGNOSIS — D48.5 NEOPLASM OF UNCERTAIN BEHAVIOR OF SKIN: Primary | ICD-10-CM

## 2020-07-08 DIAGNOSIS — Z85.828 HISTORY OF MALIGNANT NEOPLASM OF SKIN: ICD-10-CM

## 2020-07-08 DIAGNOSIS — L57.0 ACTINIC KERATOSES: ICD-10-CM

## 2020-07-08 PROCEDURE — 11104 PUNCH BX SKIN SINGLE LESION: CPT | Mod: PBBFAC | Performed by: STUDENT IN AN ORGANIZED HEALTH CARE EDUCATION/TRAINING PROGRAM

## 2020-07-08 PROCEDURE — 88305 TISSUE EXAM BY PATHOLOGIST: ICD-10-PCS | Mod: 26,,, | Performed by: PATHOLOGY

## 2020-07-08 PROCEDURE — 99999 PR PBB SHADOW E&M-EST. PATIENT-LVL III: ICD-10-PCS | Mod: PBBFAC,,,

## 2020-07-08 PROCEDURE — 99213 OFFICE O/P EST LOW 20 MIN: CPT | Mod: PBBFAC,25

## 2020-07-08 PROCEDURE — 11105 PR PUNCH BIOPSY, SKIN, EA ADDTL LESION: ICD-10-PCS | Mod: 59,S$PBB,GC, | Performed by: STUDENT IN AN ORGANIZED HEALTH CARE EDUCATION/TRAINING PROGRAM

## 2020-07-08 PROCEDURE — 88305 TISSUE EXAM BY PATHOLOGIST: CPT | Mod: 26,,, | Performed by: PATHOLOGY

## 2020-07-08 PROCEDURE — 11105 PUNCH BX SKIN EA SEP/ADDL: CPT | Mod: 59,PBBFAC | Performed by: STUDENT IN AN ORGANIZED HEALTH CARE EDUCATION/TRAINING PROGRAM

## 2020-07-08 PROCEDURE — 11105 PUNCH BX SKIN EA SEP/ADDL: CPT | Mod: 59,S$PBB,GC, | Performed by: STUDENT IN AN ORGANIZED HEALTH CARE EDUCATION/TRAINING PROGRAM

## 2020-07-08 PROCEDURE — 99213 PR OFFICE/OUTPT VISIT, EST, LEVL III, 20-29 MIN: ICD-10-PCS | Mod: 25,S$PBB,, | Performed by: DERMATOLOGY

## 2020-07-08 PROCEDURE — 99999 PR PBB SHADOW E&M-EST. PATIENT-LVL III: CPT | Mod: PBBFAC,,,

## 2020-07-08 PROCEDURE — 11104 PR PUNCH BIOPSY, SKIN, SINGLE LESION: ICD-10-PCS | Mod: S$PBB,GC,, | Performed by: STUDENT IN AN ORGANIZED HEALTH CARE EDUCATION/TRAINING PROGRAM

## 2020-07-08 PROCEDURE — 11104 PUNCH BX SKIN SINGLE LESION: CPT | Mod: S$PBB,GC,, | Performed by: STUDENT IN AN ORGANIZED HEALTH CARE EDUCATION/TRAINING PROGRAM

## 2020-07-08 PROCEDURE — 88305 TISSUE EXAM BY PATHOLOGIST: CPT | Performed by: PATHOLOGY

## 2020-07-08 PROCEDURE — 99213 OFFICE O/P EST LOW 20 MIN: CPT | Mod: 25,S$PBB,, | Performed by: DERMATOLOGY

## 2020-07-08 NOTE — PROGRESS NOTES
Subjective:       Patient ID:  Audrey Lama is a 89 y.o. 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 It is unclear from chart if the site was ever treated, and pt can't recall using the Efudex. Says the spot has grown significantly over the last 6 months. It will bleed and ooze x 1 month. Not painful.    She also complains of a new, nonhealing, scaly spot to left dorsal forearm x several months    Review of Systems   Constitutional: Negative for fever, chills and fatigue.        Objective:    Physical Exam   Constitutional: She is in a wheelchair.  She appears cachectic.   Neurological: She is alert and oriented to person, place, and time.   Skin:   Areas Examined (abnormalities noted in diagram):   Head / Face Inspection Performed  Neck Inspection Performed  RUE Inspected  LUE Inspection Performed                  Diagram Legend     Erythematous scaling macule/papule c/w actinic keratosis       Vascular papule c/w angioma      Pigmented verrucoid papule/plaque c/w seborrheic keratosis      Yellow umbilicated papule c/w sebaceous hyperplasia      Irregularly shaped tan macule c/w lentigo     1-2 mm smooth white papules consistent with Milia      Movable subcutaneous cyst with punctum c/w epidermal inclusion cyst      Subcutaneous movable cyst c/w pilar cyst      Firm pink to brown papule c/w dermatofibroma      Pedunculated fleshy papule(s) c/w skin tag(s)      Evenly pigmented macule c/w junctional nevus     Mildly variegated pigmented, slightly irregular-bordered macule c/w mildly atypical nevus      Flesh colored to evenly pigmented papule c/w intradermal nevus       Pink pearly papule/plaque c/w basal cell carcinoma      Erythematous hyperkeratotic  cursted plaque c/w SCC      Surgical scar with no sign of skin cancer recurrence      Open and closed comedones      Inflammatory papules and pustules      Verrucoid papule consistent consistent with wart     Erythematous eczematous patches and plaques     Dystrophic onycholytic nail with subungual debris c/w onychomycosis     Umbilicated papule    Erythematous-base heme-crusted tan verrucoid plaque consistent with inflamed seborrheic keratosis     Erythematous Silvery Scaling Plaque c/w Psoriasis     See annotation    Proximal left forearm (larger plaque) and distal left forearm (smaller plaque)  Both areas punch biopsied today    More photos below                        Assessment / Plan:      Pathology Orders:     Normal Orders This Visit    Specimen to Pathology, Dermatology     Questions:    Procedure Type: Dermatology and skin neoplasms    Number of Specimens: 1    ------------------------: -------------------------    Spec 1 Procedure: Biopsy    Spec 1 Clinical Impression: SCC vs other    Spec 1 Source: Proximal left forearm    Clinical Information: 2.5cm scale with heme crust on a 3cm erythematous base    Specimen to Pathology, Dermatology     Questions:    Procedure Type: Dermatology and skin neoplasms    Number of Specimens: 1    ------------------------: -------------------------    Spec 1 Procedure: Biopsy    Spec 1 Clinical Impression: SCC vs other    Spec 1 Source: distal left forearm    Clinical Information: 1cm circular scale on erythematous base          H/O NMSC, right dorsal hand  -  Sight of previous Mohs examined, no recurrence    Neoplasm of uncertain behavior of skin, Left dorsal forearm proximal and distal  -  Suspect SCC to both above described lesions  -  2x punch biopsies, note below  -  Wound care instructions reviewed    Punch biopsy procedure note:  Punch biopsy performed after verbal consent obtained. Area marked and prepped with alcohol. Approximately 1cc of 1% lidocaine with  epinephrine injected. 6 mm disposable punch used to remove lesion. Hemostasis obtained with gel foam. Wound care instructions reviewed with patient and handout given.    Actinic keratoses x 2, right dorsal hand and 3rd finger  -  Discussed lesions, will use Ln2 at follow-up visit in 2 weeks    RTC 2 weeks to review results and discuss plan and Ln2 to 2x AKs to right dorsal hand

## 2020-07-10 ENCOUNTER — PATIENT MESSAGE (OUTPATIENT)
Dept: INTERNAL MEDICINE | Facility: CLINIC | Age: 85
End: 2020-07-10

## 2020-07-10 RX ORDER — DEXTROMETHORPHAN HYDROBROMIDE, GUAIFENESIN 5; 100 MG/5ML; MG/5ML
650 LIQUID ORAL EVERY 8 HOURS
Qty: 30 TABLET | Refills: 0 | Status: SHIPPED | OUTPATIENT
Start: 2020-07-10 | End: 2023-01-31

## 2020-07-10 NOTE — PATIENT INSTRUCTIONS
"Punch Biopsy Wound Care    Your doctor has performed a punch biopsy today.  A band aid and antibiotic ointment has been placed over the site.  This should remain in place for 24 hours.  It is recommended that you keep the area dry for the first 24 hours.  After 24 hours, you may remove the band aid and wash the area with warm soap and water and apply Vaseline jelly.  Many patients prefer to use Neosporin or Bacitracin ointment.  This is acceptable; however know that you can develop an allergy to this medication even if you have used it safely for years.  It is important to keep the area moist.  Letting it dry out and get air slows healing time, will worsen the scar, and make it more difficult to remove the stitches if they were placed.  Band aid is optional after first 24 hours.      If you notice increasing redness, tenderness, pain, or yellow drainage at the biopsy or surgical site, please notify your doctor.  These are signs of an infection.    If your biopsy/surgical site is bleeding, apply firm pressure for 15 minutes straight.  Repeat for another 15 minutes, if it is still bleeding.   If the surgical site continues to bleed, then please contact your doctor.      For [facility] users:   You will receive a [facility] notification after the pathologist has finished reviewing your biopsy specimen. Pathology results, however, will not be released online so you will see a "no content" message. Once your dermatologist reviews and clinically correlates your biopsy results, you will either receive a letter in the mail with the results of a phone call from your doctor's office if further explanation or treatment is warranted.       [address] [place] [address]/ [phone] FAX/ [email or web address]         "

## 2020-07-15 LAB
FINAL PATHOLOGIC DIAGNOSIS: NORMAL
GROSS: NORMAL

## 2020-07-17 DIAGNOSIS — Z71.89 COMPLEX CARE COORDINATION: ICD-10-CM

## 2020-07-22 ENCOUNTER — CLINICAL SUPPORT (OUTPATIENT)
Dept: DERMATOLOGY | Facility: CLINIC | Age: 85
End: 2020-07-22
Payer: MEDICARE

## 2020-07-22 DIAGNOSIS — L57.0 ACTINIC KERATOSIS: ICD-10-CM

## 2020-07-22 DIAGNOSIS — C44.609 SKIN CANCER OF ARM, LEFT: Primary | ICD-10-CM

## 2020-07-22 DIAGNOSIS — D09.9 SQUAMOUS CELL CARCINOMA IN SITU: ICD-10-CM

## 2020-07-22 DIAGNOSIS — T14.8XXA WOUND INFECTION: ICD-10-CM

## 2020-07-22 DIAGNOSIS — L08.9 WOUND INFECTION: ICD-10-CM

## 2020-07-22 PROCEDURE — 99999 PR PBB SHADOW E&M-EST. PATIENT-LVL II: CPT | Mod: PBBFAC,,,

## 2020-07-22 PROCEDURE — 87070 CULTURE OTHR SPECIMN AEROBIC: CPT

## 2020-07-22 PROCEDURE — 99999 PR PBB SHADOW E&M-EST. PATIENT-LVL II: ICD-10-PCS | Mod: PBBFAC,,,

## 2020-07-22 PROCEDURE — 99212 OFFICE O/P EST SF 10 MIN: CPT | Mod: PBBFAC

## 2020-07-22 RX ORDER — CEPHALEXIN 500 MG/1
500 CAPSULE ORAL EVERY 12 HOURS
Qty: 14 CAPSULE | Refills: 0 | Status: SHIPPED | OUTPATIENT
Start: 2020-07-22 | End: 2021-05-11 | Stop reason: ALTCHOICE

## 2020-07-22 NOTE — PROGRESS NOTES
Subjective:       Patient ID:  Audrey Lama is a 89 y.o. female who presents for   Chief Complaint   Patient presents with    Infection     HPI  89yo woman returns today for concerns of nonhealing wounds at 2 sites of previous punch biopsies on right arm performed 2 weeks prior. Daughter is with patient today, says the nursing facility in which patient lives has not taken care of her wounds or cuts. Daughter is concerned about infection.     Proximal biopsy site is not painful or indurated and in fact, to daughter, appears better than before biopsy. However distal biopsy site to left dorsal forearm is swollen and weeping. Patient without fevers, chills, no tumor, dolor, rubor, calor to other parts of RUE. Pain limited to site of non-healing biopsy.     PATHOLOGY:  1.  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   - THE TUMOR EXTENDS TO A PERIPHERAL BIOPSY MARGIN.   MICROSCOPIC DESCRIPTION: Sections show epidermis with full-thickness atypia,   parakeratosis and variable epidermal maturation. Dermal involvement is not   seen.  There is underlying dermal fibrosis and a brisk associated   lymphoplasmacytic inflammatory infiltrate.   2.  Skin, distal left forearm, punch biopsy:   - ACTINIC KERATOSIS, INFLAMED.   MICROSCOPIC DESCRIPTION:  Sections show atypia and maturation disarray within   the lowermost epidermal layers associated with hyper- and parakeratosis and   solar elastosis. An inflammatory infiltrate is additionally seen.      Review of Systems   Constitutional: Negative for fever, chills and fatigue.   Skin: Negative for itching and rash.       Objective:    Physical Exam   Constitutional: She appears well-developed and well-nourished. She is in a wheelchair.    Neurological: She is alert and oriented to person, place, and time.   Skin:                Diagram Legend     Erythematous scaling macule/papule c/w actinic keratosis       Vascular papule c/w  angioma      Pigmented verrucoid papule/plaque c/w seborrheic keratosis      Yellow umbilicated papule c/w sebaceous hyperplasia      Irregularly shaped tan macule c/w lentigo     1-2 mm smooth white papules consistent with Milia      Movable subcutaneous cyst with punctum c/w epidermal inclusion cyst      Subcutaneous movable cyst c/w pilar cyst      Firm pink to brown papule c/w dermatofibroma      Pedunculated fleshy papule(s) c/w skin tag(s)      Evenly pigmented macule c/w junctional nevus     Mildly variegated pigmented, slightly irregular-bordered macule c/w mildly atypical nevus      Flesh colored to evenly pigmented papule c/w intradermal nevus       Pink pearly papule/plaque c/w basal cell carcinoma      Erythematous hyperkeratotic cursted plaque c/w SCC      Surgical scar with no sign of skin cancer recurrence      Open and closed comedones      Inflammatory papules and pustules      Verrucoid papule consistent consistent with wart     Erythematous eczematous patches and plaques     Dystrophic onycholytic nail with subungual debris c/w onychomycosis     Umbilicated papule    Erythematous-base heme-crusted tan verrucoid plaque consistent with inflamed seborrheic keratosis     Erythematous Silvery Scaling Plaque c/w Psoriasis     See annotation          Assessment / Plan:     SCCIS, proximal left arm - for Moh's   -  Biopsy site healing by secondary intention, discussed treatment options with patient including efudex, surgery. Patient does not receive wound care at facility - not a good candidate for efudex which would require specific application, extensive wound care, and surveillance. Patient and daughter opt for Moh's. Will refer to Dr. Fadia Brown AK  Wound infection  -  Culture taken today from distal biopsy site for aerobic/anaerobic  -  Start Keflex PO BID x 7 days    RTC 3 months

## 2020-07-23 ENCOUNTER — TELEPHONE (OUTPATIENT)
Dept: DERMATOLOGY | Facility: CLINIC | Age: 85
End: 2020-07-23

## 2020-07-23 NOTE — TELEPHONE ENCOUNTER
Contacted Ms. Jasso  (pt's daughter) Let her know that Dr. Loaiza is suggested that she uses efudex cream to treat the site. She states that the site is to large and Ms. Kelby skin is to paper thin to treat with Mohs at this time. She would like to have site shrink down by using the efudex and then re evaluate it in a few months. Ms. Jasso stated concerns for her mom using the efudex and it being painful for her. I explained that Dr. Loaiza feels as though this is the best way to treat it given it being a large area. Also let her know that Dr. Loaiza consulted with the resident who will be seeing her at her appt on 7/27/2020 and they are both on the same page with this treatment. She stated that she may want to try to get home kawme to assist her mom with applying the cream I let her know that she could take to the resident because I was not sure about the criteria regarding home health and this treatment.

## 2020-07-25 LAB — BACTERIA SPEC AEROBE CULT: NO GROWTH

## 2020-07-28 ENCOUNTER — TELEPHONE (OUTPATIENT)
Dept: DERMATOLOGY | Facility: CLINIC | Age: 85
End: 2020-07-28

## 2020-07-28 NOTE — TELEPHONE ENCOUNTER
Spoke with daughter of patient who is patient's primary care taker. She has spoken with patient regarding treatment options for below pathology including surgery vs effudex vs clinical monitoring.     For the SCCIS, patient prefers to defer treatment at this time and opts for clinical monitoring. Patient is unable to apply effudex to her arm and to perform appropriate wound care for the 6 weeks of treatment that this would require. Patient's assisted living facilities do not perform wound care. Daughter is unable to apply effudex and perform wound care for patient because patient's assisted living arrangements do not allow outside visitors at this time due to coronavirus. Any surgical intervention at this time would again require proper wound care and a recovery period - the first is impossible to perform for the above reasons and the latter is against patient's wishes at this time. Patient understands that the SCCIS can progress and worsen and chooses to clinically monitor.    All questions were answered and an informed discussion was held. Patient and daughter will f/u with Dr. Cifuentes next week for a dermatology virtual visit and schedule appropriate follow-up for SCCIS and AK monitoring.    PATHOLOGY:  1.  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   - THE TUMOR EXTENDS TO A PERIPHERAL BIOPSY MARGIN.   MICROSCOPIC DESCRIPTION: Sections show epidermis with full-thickness atypia,   parakeratosis and variable epidermal maturation. Dermal involvement is not   seen.  There is underlying dermal fibrosis and a brisk associated   lymphoplasmacytic inflammatory infiltrate.   2.  Skin, distal left forearm, punch biopsy:   - ACTINIC KERATOSIS, INFLAMED.   MICROSCOPIC DESCRIPTION:  Sections show atypia and maturation disarray within   the lowermost epidermal layers associated with hyper- and parakeratosis and   solar elastosis. An inflammatory infiltrate is additionally  seen.

## 2020-07-29 ENCOUNTER — PATIENT OUTREACH (OUTPATIENT)
Dept: ADMINISTRATIVE | Facility: OTHER | Age: 85
End: 2020-07-29

## 2020-07-29 NOTE — PROGRESS NOTES
Requested updates within Care Everywhere.  Patient's chart was reviewed for overdue ROMULO topics.  Immunizations reconciled.    Orders placed:n/a  Tasked appts:n/a  Labs Linked:n/a

## 2020-07-30 ENCOUNTER — OFFICE VISIT (OUTPATIENT)
Dept: DERMATOLOGY | Facility: CLINIC | Age: 85
End: 2020-07-30
Payer: MEDICARE

## 2020-07-30 DIAGNOSIS — L57.0 AK (ACTINIC KERATOSIS): ICD-10-CM

## 2020-07-30 DIAGNOSIS — D09.9 SQUAMOUS CELL CARCINOMA IN SITU (SCCIS): Primary | ICD-10-CM

## 2020-07-30 PROCEDURE — 99212 PR OFFICE/OUTPT VISIT, EST, LEVL II, 10-19 MIN: ICD-10-PCS | Mod: 95,,, | Performed by: DERMATOLOGY

## 2020-07-30 PROCEDURE — 99212 OFFICE O/P EST SF 10 MIN: CPT | Mod: 95,,, | Performed by: DERMATOLOGY

## 2020-07-30 RX ORDER — FLUOROURACIL 50 MG/G
CREAM TOPICAL
Qty: 40 G | Refills: 1 | Status: SHIPPED | OUTPATIENT
Start: 2020-07-30 | End: 2021-05-11 | Stop reason: ALTCHOICE

## 2020-07-30 NOTE — PROGRESS NOTES
Subjective:       Patient ID:  Audrey Lama is a 89 y.o. female who presents for   Chief Complaint   Patient presents with    Lesion     HPI  The patient location is: home  The chief complaint leading to consultation is: skin cancer    Visit type: audiovisual    Face to Face time with patient: 8 min  10 minutes of total time spent on the encounter, which includes face to face time and non-face to face time preparing to see the patient (eg, review of tests), Obtaining and/or reviewing separately obtained history, Documenting clinical information in the electronic or other health record, Independently interpreting results (not separately reported) and communicating results to the patient/family/caregiver, or Care coordination (not separately reported).         Each patient to whom he or she provides medical services by telemedicine is:  (1) informed of the relationship between the physician and patient and the respective role of any other health care provider with respect to management of the patient; and (2) notified that he or she may decline to receive medical services by telemedicine and may withdraw from such care at any time.    Notes:   Pt presents today via video visit during Covid-19 pandemic for discussion of skin cancer treatment. She was seen in our derm resident clinic on 7/8/2020 at which time two lesions were biopsied on her left forearm:     1.  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   - THE TUMOR EXTENDS TO A PERIPHERAL BIOPSY MARGIN.   2.  Skin, distal left forearm, punch biopsy:   - ACTINIC KERATOSIS, INFLAMED.     Mohs was initially recommended for the proximal lesion, but Dr. Loaiza suggested treating first with Efudex due to the size of the lesion and the pt's thin skin. Pt presents today with her caretaker to discuss treating these sites topically.    Review of Systems   Constitutional: Negative for fever and chills.   Skin:  Negative for itching and rash.        Objective:    Physical Exam   Constitutional: She appears well-developed and well-nourished. No distress.   Neurological: She is alert and oriented to person, place, and time. She is not disoriented.   Psychiatric: She has a normal mood and affect.   Skin:   Areas Examined (abnormalities noted in diagram):   LUE Inspection Performed              Diagram Legend     Erythematous scaling macule/papule c/w actinic keratosis       Vascular papule c/w angioma      Pigmented verrucoid papule/plaque c/w seborrheic keratosis      Yellow umbilicated papule c/w sebaceous hyperplasia      Irregularly shaped tan macule c/w lentigo     1-2 mm smooth white papules consistent with Milia      Movable subcutaneous cyst with punctum c/w epidermal inclusion cyst      Subcutaneous movable cyst c/w pilar cyst      Firm pink to brown papule c/w dermatofibroma      Pedunculated fleshy papule(s) c/w skin tag(s)      Evenly pigmented macule c/w junctional nevus     Mildly variegated pigmented, slightly irregular-bordered macule c/w mildly atypical nevus      Flesh colored to evenly pigmented papule c/w intradermal nevus       Pink pearly papule/plaque c/w basal cell carcinoma      Erythematous hyperkeratotic cursted plaque c/w SCC      Surgical scar with no sign of skin cancer recurrence      Open and closed comedones      Inflammatory papules and pustules      Verrucoid papule consistent consistent with wart     Erythematous eczematous patches and plaques     Dystrophic onycholytic nail with subungual debris c/w onychomycosis     Umbilicated papule    Erythematous-base heme-crusted tan verrucoid plaque consistent with inflamed seborrheic keratosis     Erythematous Silvery Scaling Plaque c/w Psoriasis     See annotation      Assessment / Plan:      AK (actinic keratosis) and Squamous cell carcinoma in situ (SCCIS)- L forearm  Counseled pt and caregiver about Efudex use. Recommended BID treatment x 4-6  weeks for site closer to elbow and BID treatment x 2-3 wks for site closer to wrist.  Counseled extensively regarding the proper use and side effects of efudex. Pt will discontinue use if areas begin to ulcerate, bleed, blister, etc.  -     fluorouraciL (EFUDEX) 5 % cream; AAA on L forearm BID x 4-6 weeks. Stop if blistered, oozing, or bleeding. Use daily sun protection.  Dispense: 40 g; Refill: 1    After tx, may use vaseline or Aquaphor while site heals.    Follow up in about 3 months (around 10/30/2020) for skin check or sooner for any concerns.    *ADDENDUM - after this visit, a msg was received from pt's daughter stating that she spoke with our derm resident prior to this virtual visit and they had decided to delay treatment for her mother's arm due to concerns about wound care at the nursing facility. Daughter is not able to go to the facility to help with Efudex due to Covid-19. Instead, they'd prefer to monitor the site and will return in 6 months or sooner if it worsens.

## 2020-07-30 NOTE — PATIENT INSTRUCTIONS
Here are some instructions for use of the topical cream to treat the left forearm. It is called Efudex (5-fluorouracil). You should apply it to the affected areas PLUS about a centimeter of surrounding normal skin twice daily for 4-6 wks for the area closer to the elbow and 2-3 weeks for the area closer to the wrist. The areas will look worse (more red and scaly) while using it, but this is expected. You should discontinue use (and let me know) if the areas begin to ulcerate, bleed, blister, or become very raw/painful. You should also wait until the biopsy sites have healed before starting it. Use sun protection because this cream will make you more sensitive to the sun. After treatment, it is recommended to use Vaseline or Aquaphor while the skin heals. If your skin is very irritated and these don't help, let me know and I can send a mild topical steroid for you to use to calm down the reaction. Please let me know if you have any questions. I'd like to see you again in about 3 months to re-check the skin.

## 2020-10-01 ENCOUNTER — PATIENT MESSAGE (OUTPATIENT)
Dept: OTHER | Facility: OTHER | Age: 85
End: 2020-10-01

## 2020-12-11 ENCOUNTER — PATIENT MESSAGE (OUTPATIENT)
Dept: OTHER | Facility: OTHER | Age: 85
End: 2020-12-11

## 2021-01-07 ENCOUNTER — PATIENT MESSAGE (OUTPATIENT)
Dept: INTERNAL MEDICINE | Facility: CLINIC | Age: 86
End: 2021-01-07

## 2021-02-07 ENCOUNTER — PATIENT MESSAGE (OUTPATIENT)
Dept: INTERNAL MEDICINE | Facility: CLINIC | Age: 86
End: 2021-02-07

## 2021-02-07 DIAGNOSIS — R39.9 LOWER URINARY TRACT SYMPTOMS: Primary | ICD-10-CM

## 2021-02-08 ENCOUNTER — LAB VISIT (OUTPATIENT)
Dept: LAB | Facility: HOSPITAL | Age: 86
End: 2021-02-08
Attending: INTERNAL MEDICINE
Payer: MEDICARE

## 2021-02-08 ENCOUNTER — PATIENT MESSAGE (OUTPATIENT)
Dept: INTERNAL MEDICINE | Facility: CLINIC | Age: 86
End: 2021-02-08

## 2021-02-08 DIAGNOSIS — R39.9 LOWER URINARY TRACT SYMPTOMS: ICD-10-CM

## 2021-02-08 LAB
BILIRUB UR QL STRIP: NEGATIVE
CLARITY UR REFRACT.AUTO: CLEAR
COLOR UR AUTO: YELLOW
GLUCOSE UR QL STRIP: NEGATIVE
HGB UR QL STRIP: NEGATIVE
KETONES UR QL STRIP: NEGATIVE
LEUKOCYTE ESTERASE UR QL STRIP: ABNORMAL
MICROSCOPIC COMMENT: NORMAL
NITRITE UR QL STRIP: NEGATIVE
PH UR STRIP: 6 [PH] (ref 5–8)
PROT UR QL STRIP: NEGATIVE
SP GR UR STRIP: 1.01 (ref 1–1.03)
URN SPEC COLLECT METH UR: ABNORMAL
UROBILINOGEN UR STRIP-ACNC: 1 EU/DL
WBC #/AREA URNS AUTO: 2 /HPF (ref 0–5)

## 2021-02-08 PROCEDURE — 87086 URINE CULTURE/COLONY COUNT: CPT | Mod: PO

## 2021-02-08 PROCEDURE — 81000 URINALYSIS NONAUTO W/SCOPE: CPT | Mod: PO

## 2021-02-09 LAB — BACTERIA UR CULT: NORMAL

## 2021-02-11 ENCOUNTER — OFFICE VISIT (OUTPATIENT)
Dept: INTERNAL MEDICINE | Facility: CLINIC | Age: 86
End: 2021-02-11
Payer: MEDICARE

## 2021-02-11 DIAGNOSIS — F32.0 CURRENT MILD EPISODE OF MAJOR DEPRESSIVE DISORDER WITHOUT PRIOR EPISODE: Primary | ICD-10-CM

## 2021-02-11 PROCEDURE — 99213 PR OFFICE/OUTPT VISIT, EST, LEVL III, 20-29 MIN: ICD-10-PCS | Mod: 95,,, | Performed by: INTERNAL MEDICINE

## 2021-02-11 PROCEDURE — 99213 OFFICE O/P EST LOW 20 MIN: CPT | Mod: 95,,, | Performed by: INTERNAL MEDICINE

## 2021-02-11 RX ORDER — SERTRALINE HYDROCHLORIDE 25 MG/1
25 TABLET, FILM COATED ORAL DAILY
Qty: 30 TABLET | Refills: 2 | Status: SHIPPED | OUTPATIENT
Start: 2021-02-11 | End: 2021-06-14 | Stop reason: SDUPTHER

## 2021-02-12 ENCOUNTER — TELEPHONE (OUTPATIENT)
Dept: INTERNAL MEDICINE | Facility: CLINIC | Age: 86
End: 2021-02-12

## 2021-02-13 ENCOUNTER — PATIENT MESSAGE (OUTPATIENT)
Dept: INTERNAL MEDICINE | Facility: CLINIC | Age: 86
End: 2021-02-13

## 2021-02-15 ENCOUNTER — PATIENT MESSAGE (OUTPATIENT)
Dept: INTERNAL MEDICINE | Facility: CLINIC | Age: 86
End: 2021-02-15

## 2021-03-18 ENCOUNTER — PES CALL (OUTPATIENT)
Dept: ADMINISTRATIVE | Facility: CLINIC | Age: 86
End: 2021-03-18

## 2021-04-01 ENCOUNTER — TELEPHONE (OUTPATIENT)
Dept: ADMINISTRATIVE | Facility: CLINIC | Age: 86
End: 2021-04-01

## 2021-04-05 ENCOUNTER — PATIENT MESSAGE (OUTPATIENT)
Dept: INTERNAL MEDICINE | Facility: CLINIC | Age: 86
End: 2021-04-05

## 2021-04-08 ENCOUNTER — TELEPHONE (OUTPATIENT)
Dept: ADMINISTRATIVE | Facility: CLINIC | Age: 86
End: 2021-04-08

## 2021-04-12 ENCOUNTER — PATIENT MESSAGE (OUTPATIENT)
Dept: INTERNAL MEDICINE | Facility: CLINIC | Age: 86
End: 2021-04-12

## 2021-04-15 ENCOUNTER — PATIENT MESSAGE (OUTPATIENT)
Dept: RESEARCH | Facility: HOSPITAL | Age: 86
End: 2021-04-15

## 2021-04-15 ENCOUNTER — PATIENT MESSAGE (OUTPATIENT)
Dept: INTERNAL MEDICINE | Facility: CLINIC | Age: 86
End: 2021-04-15

## 2021-04-28 ENCOUNTER — PATIENT MESSAGE (OUTPATIENT)
Dept: INTERNAL MEDICINE | Facility: CLINIC | Age: 86
End: 2021-04-28

## 2021-05-11 ENCOUNTER — OFFICE VISIT (OUTPATIENT)
Dept: HOME HEALTH SERVICES | Facility: CLINIC | Age: 86
End: 2021-05-11
Payer: MEDICARE

## 2021-05-11 VITALS
HEART RATE: 61 BPM | TEMPERATURE: 98 F | DIASTOLIC BLOOD PRESSURE: 58 MMHG | HEIGHT: 64 IN | OXYGEN SATURATION: 97 % | SYSTOLIC BLOOD PRESSURE: 146 MMHG | BODY MASS INDEX: 27.66 KG/M2 | WEIGHT: 162 LBS

## 2021-05-11 DIAGNOSIS — E78.00 PURE HYPERCHOLESTEROLEMIA: ICD-10-CM

## 2021-05-11 DIAGNOSIS — E11.22 CONTROLLED TYPE 2 DIABETES MELLITUS WITH STAGE 3 CHRONIC KIDNEY DISEASE, WITHOUT LONG-TERM CURRENT USE OF INSULIN: ICD-10-CM

## 2021-05-11 DIAGNOSIS — F32.0 CURRENT MILD EPISODE OF MAJOR DEPRESSIVE DISORDER WITHOUT PRIOR EPISODE: ICD-10-CM

## 2021-05-11 DIAGNOSIS — Z85.43 HISTORY OF OVARIAN CANCER: ICD-10-CM

## 2021-05-11 DIAGNOSIS — R26.9 ABNORMALITY OF GAIT AND MOBILITY: ICD-10-CM

## 2021-05-11 DIAGNOSIS — I70.0 CALCIFICATION OF AORTA: ICD-10-CM

## 2021-05-11 DIAGNOSIS — I10 ESSENTIAL HYPERTENSION: ICD-10-CM

## 2021-05-11 DIAGNOSIS — Z74.09 OTHER REDUCED MOBILITY: ICD-10-CM

## 2021-05-11 DIAGNOSIS — Z00.00 ENCOUNTER FOR PREVENTIVE HEALTH EXAMINATION: Primary | ICD-10-CM

## 2021-05-11 DIAGNOSIS — N18.30 CONTROLLED TYPE 2 DIABETES MELLITUS WITH STAGE 3 CHRONIC KIDNEY DISEASE, WITHOUT LONG-TERM CURRENT USE OF INSULIN: ICD-10-CM

## 2021-05-11 DIAGNOSIS — Z99.89 DEPENDENCE ON OTHER ENABLING MACHINES AND DEVICES: ICD-10-CM

## 2021-05-11 PROCEDURE — G0439 PR MEDICARE ANNUAL WELLNESS SUBSEQUENT VISIT: ICD-10-PCS | Mod: S$GLB,,, | Performed by: NURSE PRACTITIONER

## 2021-05-11 PROCEDURE — G0439 PPPS, SUBSEQ VISIT: HCPCS | Mod: S$GLB,,, | Performed by: NURSE PRACTITIONER

## 2021-05-12 ENCOUNTER — PATIENT MESSAGE (OUTPATIENT)
Dept: INTERNAL MEDICINE | Facility: CLINIC | Age: 86
End: 2021-05-12

## 2021-05-20 ENCOUNTER — PES CALL (OUTPATIENT)
Dept: ADMINISTRATIVE | Facility: CLINIC | Age: 86
End: 2021-05-20

## 2021-05-25 ENCOUNTER — PATIENT MESSAGE (OUTPATIENT)
Dept: INTERNAL MEDICINE | Facility: CLINIC | Age: 86
End: 2021-05-25

## 2021-05-26 ENCOUNTER — PATIENT MESSAGE (OUTPATIENT)
Dept: INTERNAL MEDICINE | Facility: CLINIC | Age: 86
End: 2021-05-26

## 2021-05-26 RX ORDER — SILVER SULFADIAZINE 10 G/1000G
CREAM TOPICAL DAILY
Qty: 25 G | Refills: 0 | Status: SHIPPED | OUTPATIENT
Start: 2021-05-26 | End: 2021-09-28

## 2021-05-26 RX ORDER — SILVER SULFADIAZINE 10 G/1000G
CREAM TOPICAL DAILY
Qty: 25 G | Status: SHIPPED | OUTPATIENT
Start: 2021-05-26 | End: 2021-05-26 | Stop reason: SDUPTHER

## 2021-06-14 RX ORDER — SERTRALINE HYDROCHLORIDE 25 MG/1
25 TABLET, FILM COATED ORAL DAILY
Qty: 30 TABLET | Refills: 2 | Status: SHIPPED | OUTPATIENT
Start: 2021-06-14 | End: 2021-10-05 | Stop reason: SDUPTHER

## 2021-10-05 RX ORDER — SERTRALINE HYDROCHLORIDE 25 MG/1
25 TABLET, FILM COATED ORAL DAILY
Qty: 30 TABLET | Refills: 6 | Status: SHIPPED | OUTPATIENT
Start: 2021-10-05 | End: 2022-12-12 | Stop reason: SDUPTHER

## 2021-12-23 ENCOUNTER — PATIENT MESSAGE (OUTPATIENT)
Dept: INTERNAL MEDICINE | Facility: CLINIC | Age: 86
End: 2021-12-23
Payer: MEDICARE

## 2021-12-23 DIAGNOSIS — N18.2 CONTROLLED TYPE 2 DIABETES MELLITUS WITH STAGE 2 CHRONIC KIDNEY DISEASE, WITHOUT LONG-TERM CURRENT USE OF INSULIN: ICD-10-CM

## 2021-12-23 DIAGNOSIS — I10 PRIMARY HYPERTENSION: ICD-10-CM

## 2021-12-23 DIAGNOSIS — E11.22 CONTROLLED TYPE 2 DIABETES MELLITUS WITH STAGE 2 CHRONIC KIDNEY DISEASE, WITHOUT LONG-TERM CURRENT USE OF INSULIN: ICD-10-CM

## 2021-12-23 DIAGNOSIS — R53.81 DEBILITY: Primary | ICD-10-CM

## 2021-12-28 ENCOUNTER — PATIENT MESSAGE (OUTPATIENT)
Dept: INTERNAL MEDICINE | Facility: CLINIC | Age: 86
End: 2021-12-28
Payer: MEDICARE

## 2022-01-04 ENCOUNTER — PES CALL (OUTPATIENT)
Dept: HOME HEALTH SERVICES | Facility: CLINIC | Age: 87
End: 2022-01-04
Payer: MEDICARE

## 2022-01-05 ENCOUNTER — PATIENT MESSAGE (OUTPATIENT)
Dept: INTERNAL MEDICINE | Facility: CLINIC | Age: 87
End: 2022-01-05
Payer: MEDICARE

## 2022-01-13 ENCOUNTER — PATIENT MESSAGE (OUTPATIENT)
Dept: INTERNAL MEDICINE | Facility: CLINIC | Age: 87
End: 2022-01-13
Payer: MEDICARE

## 2022-01-19 ENCOUNTER — CARE AT HOME (OUTPATIENT)
Dept: HOME HEALTH SERVICES | Facility: CLINIC | Age: 87
End: 2022-01-19
Payer: MEDICARE

## 2022-01-19 VITALS
HEART RATE: 78 BPM | HEIGHT: 64 IN | RESPIRATION RATE: 18 BRPM | OXYGEN SATURATION: 98 % | DIASTOLIC BLOOD PRESSURE: 85 MMHG | WEIGHT: 162 LBS | BODY MASS INDEX: 27.66 KG/M2 | SYSTOLIC BLOOD PRESSURE: 134 MMHG | TEMPERATURE: 98 F

## 2022-01-19 DIAGNOSIS — I10 PRIMARY HYPERTENSION: ICD-10-CM

## 2022-01-19 DIAGNOSIS — R53.81 DEBILITY: ICD-10-CM

## 2022-01-19 DIAGNOSIS — E11.22 CONTROLLED TYPE 2 DIABETES MELLITUS WITH STAGE 2 CHRONIC KIDNEY DISEASE, WITHOUT LONG-TERM CURRENT USE OF INSULIN: ICD-10-CM

## 2022-01-19 DIAGNOSIS — N18.2 CONTROLLED TYPE 2 DIABETES MELLITUS WITH STAGE 2 CHRONIC KIDNEY DISEASE, WITHOUT LONG-TERM CURRENT USE OF INSULIN: ICD-10-CM

## 2022-01-19 PROCEDURE — 99350 PR HOME VISIT,ESTAB PATIENT,LEVEL IV: ICD-10-PCS | Mod: S$GLB,,, | Performed by: NURSE PRACTITIONER

## 2022-01-19 PROCEDURE — 99497 PR ADVNCD CARE PLAN 30 MIN: ICD-10-PCS | Mod: S$GLB,,, | Performed by: NURSE PRACTITIONER

## 2022-01-19 PROCEDURE — 99350 HOME/RES VST EST HIGH MDM 60: CPT | Mod: S$GLB,,, | Performed by: NURSE PRACTITIONER

## 2022-01-19 PROCEDURE — 99497 ADVNCD CARE PLAN 30 MIN: CPT | Mod: S$GLB,,, | Performed by: NURSE PRACTITIONER

## 2022-01-19 NOTE — PROGRESS NOTES
"[facility]  Medical Home Visit    Visit Date: 2022  Encounter Provider: Hoda Bae, JADEN  PCP:  Jia Borjas MD    Subjective:      Patient ID: Audrey Lama is a 89 y.o. female.    Consult Requested By:  Dr. Sandra Cummings  Reason for Consult:  Establish Home Care    Audrey is being seen at home due to  physical debility that presents a taxing effort to leave the home, to mitigate high risk of hospital readmission or due to the limited availability of reliable or safe options for transportation to the point of access to the provider. Prior to treatment on this visit the chart was reviewed and patient consent was obtained.        Chief Complaint: Establish Care    HPI Audrey Lama is a 89 year old female with a past medical history of Diabetes mellitus, Chronic kidney disease, Hypertension, Hyperlipidemia, Major Depression Disorder, and a History of Ovarian cancer.     With this visit today patient is found sitting up in her room at [facility] where she resides. Patient is AAOx3, able to verify her name and . Most of patients other history was received from her daughter over the phone and her medical record. She currently sees  Dr. Borjas as her PCP but has not had an appointment since the COVID pandemic. I will continue seeing the patient in the home as it is difficult for him to physically make visits. She endorses eating x 3 meals per day cooked by the facilty. She endorses regular BMs .    She has a nodule on her left pointer finger in which she denies pain and reports she does not want to see a specialist or MD for it stating, "Let me live with it and Die with it." Her daughter is aware with no additional concerns.     VSS. Denies fever, chest pain, shortness of breath, nausea, vomiting, diarrhea. Risks of environmental exposure to coronavirus discussed including: social distancing, hand hygiene, and limiting departures from the home for necessities only.  Reports " understanding and willingness to comply.  All hospital discharge orders reviewed and being followed, all medications reconciled and reviewed, patient and family verbalized understanding. No other needs identified at this time.     I initiated the process of advance care planning today and explained the importance of this process to the patient and family.  I introduced the concept of advance directives to the patient, as well. Then the patient received detailed information about the importance of designating a Health Care Power of  (HCPOA). She was also instructed to communicate with this person about his wishes for future healthcare, should he become sick and lose decision-making capacity. FULL CODE STATUS    I Introduced [place] form with patient/family, explaining this is the patient's wishes, and this form will be uploaded into the patient's [facility] and the [place] Registry.     We spoke about ACP for 20 minutes.    Attestation: Screening criteria to assess the level of the patient's risk for infection with COVID-19 as recommended by the CDC at the time of the above documented home visit concluded appropriateness to proceed. Universal precautions were maintained at all times, including provider use of 60% alcohol gel hand  immediately prior to entry and upon departing the patient's home      Review of Systems   Constitutional: Negative for activity change, fatigue and unexpected weight change.   HENT: Negative for congestion, dental problem and trouble swallowing.    Eyes: Positive for visual disturbance. Negative for redness.   Respiratory: Negative for cough, shortness of breath and wheezing.    Cardiovascular: Negative for chest pain and palpitations.   Gastrointestinal: Negative for abdominal distention, abdominal pain, nausea and vomiting.   Endocrine: Negative for polydipsia and polyuria.   Genitourinary: Negative for difficulty urinating and dysuria.   Musculoskeletal: Positive for  "gait problem. Negative for arthralgias.   Skin: Negative for color change and rash.        Nodule on left hand pointer finger   Allergic/Immunologic: Negative for food allergies.   Neurological: Positive for weakness. Negative for dizziness and tremors.   Psychiatric/Behavioral: Negative for agitation.       Assessments:  · Environmental: [place] in [place]  · Functional Status: Min asst with ADLs and mobility  · Safety: Fall precautions  · Nutritional: Cardiac diet  · Home Health/DME/Supplies: rollator    Objective:   Physical Exam  HENT:      Head: Normocephalic.      Right Ear: Tympanic membrane normal.      Left Ear: Tympanic membrane normal.      Nose: Nose normal.      Mouth/Throat:      Mouth: Mucous membranes are moist.   Eyes:      Pupils: Pupils are equal, round, and reactive to light.   Cardiovascular:      Rate and Rhythm: Normal rate.      Pulses: Normal pulses.   Pulmonary:      Effort: Pulmonary effort is normal.      Breath sounds: Normal breath sounds.   Abdominal:      General: Abdomen is flat.      Palpations: Abdomen is soft.   Musculoskeletal:         General: Normal range of motion.      Cervical back: Normal range of motion.       Skin:     General: Skin is warm.      Capillary Refill: Capillary refill takes less than 2 seconds.   Neurological:      General: No focal deficit present.      Mental Status: She is alert.   Psychiatric:         Mood and Affect: Mood normal.         Vitals:    01/19/22 1100   BP: 134/85   Pulse: 78   Resp: 18   Temp: 97.6 °F (36.4 °C)   SpO2: 98%   Weight: 73.5 kg (162 lb)   Height: 5' 4" (1.626 m)   PainSc: 0-No pain     Body mass index is 27.81 kg/m².    Assessment:   Audrey was seen today for \[facility]\"" care.    Diagnoses and all orders for this visit:    Debility  -     Ambulatory referral/consult to [facility]    Controlled type 2 diabetes mellitus with stage 2 chronic kidney disease, without long-term current use of insulin  -     " [facility]    Primary hypertension  -     [facility]    Skin Lesion  Patient does not want to see MD or does not want further treatment for at this time, will monitor    Plan:     Ethical / Legal: Advance Care Planning   · Surrogate decision maker:  Name Hugo Veliz, Relationship: Daughter  · Code Status: Full code  · [place]:  Discussed with daughter  · Other advance directive:  discussed       Audrey was seen today for establish care.    Diagnoses and all orders for this visit:  Debility  -     Ambulatory referral/consult to [facility]    Controlled type 2 diabetes mellitus with stage 2 chronic kidney disease, without long-term current use of insulin  -     Amb[facility]    Primary hypertension  -     Ambulatory referral/consult to [facility]    Skin Lesion  Patient does not want to see MD or does not want further treatment for at this time, will monitor       Were controlled substances prescribed?  No    Follow Up Appointments:   No future appointments.    Signature:  Hoda aBe NP    Patient consent obtained prior to treatment at this visit.

## 2022-01-19 NOTE — PATIENT INSTRUCTIONS
Instructions:  - [facility] Practition[facility] to schedule home follow-up visit with patient in 4-6 weeks or as needed.  - Continue all medications, treatments and therapies as ordered.   - Follow all instructions, recommendations as discussed.  - Maintain Safety Precautions at all times.  - Attend all medical appointments as scheduled.  - For worsening symptoms: call Primary Care Physician or Nurse Practitioner.  - For emergencies, call 911 or immediately report to the nearest emergency room.  - Limit Risks of environmental exposure to coronavirus/COVID-19 as discussed including: social distancing, hand hygiene, and limiting departures from the home for necessities only.

## 2022-03-02 ENCOUNTER — CARE AT HOME (OUTPATIENT)
Dept: HOME HEALTH SERVICES | Facility: CLINIC | Age: 87
End: 2022-03-02
Payer: MEDICARE

## 2022-03-02 VITALS
DIASTOLIC BLOOD PRESSURE: 71 MMHG | BODY MASS INDEX: 27.66 KG/M2 | SYSTOLIC BLOOD PRESSURE: 126 MMHG | TEMPERATURE: 98 F | HEIGHT: 64 IN | RESPIRATION RATE: 18 BRPM | OXYGEN SATURATION: 98 % | HEART RATE: 69 BPM | WEIGHT: 162 LBS

## 2022-03-02 DIAGNOSIS — Z51.5 PALLIATIVE CARE ENCOUNTER: Primary | ICD-10-CM

## 2022-03-02 PROCEDURE — 99350 HOME/RES VST EST HIGH MDM 60: CPT | Mod: S$GLB,,, | Performed by: NURSE PRACTITIONER

## 2022-03-02 PROCEDURE — 99350 PR HOME VISIT,ESTAB PATIENT,LEVEL IV: ICD-10-PCS | Mod: S$GLB,,, | Performed by: NURSE PRACTITIONER

## 2022-03-02 NOTE — PROGRESS NOTES
"[facility]  Medical Home Visit    Visit Date: 3/2/2022  Encounter Provider: Hoda Bae, JADEN  PCP:  Jia Borjas MD (Inactive)    Subjective:      Patient ID: Audrey Lama is a 89 y.o. female.    Consult Requested By:  No ref. provider found  Reason for Consult:  Establish Home Care    Audrey is being seen at home due to  physical debility that presents a taxing effort to leave the home, to mitigate high risk of hospital readmission or due to the limited availability of reliable or safe options for transportation to the point of access to the provider. Prior to treatment on this visit the chart was reviewed and patient consent was obtained.        Chief Complaint: Follow-up    Follow-up  Associated symptoms include weakness. Pertinent negatives include no abdominal pain, arthralgias, chest pain, congestion, coughing, fatigue, nausea, rash or vomiting.    Audrey Lama is a 89 year old female with a past medical history of Diabetes mellitus, Chronic kidney disease, Hypertension, Hyperlipidemia, Major Depression Disorder, and a History of Ovarian cancer.     With this visit today patient is found sitting outside on the porch at [facility] where she resides. Patient is AAOx3, able to verify her name and . Most of patients other history was received from her daughter over the phone at previous visit and her medical record. She currently sees  Dr. Borjas as her PCP but has not had an appointment since the COVID pandemic. I will continue seeing the patient in the home as it is difficult for him to physically make visits. She endorses eating x 3 meals per day cooked by the facilty. She endorses regular BMs .    She has a nodule on her left pointer finger in which she denies pain and reports she does not want to see a specialist or MD for it stating, "Let me live with it and Die with it." Her daughter is aware with no additional concerns. The nodule does appear to be decreasing in " size.     VSS. Denies fever, chest pain, shortness of breath, nausea, vomiting, diarrhea. Risks of environmental exposure to coronavirus discussed including: social distancing, hand hygiene, and limiting departures from the home for necessities only.  Reports understanding and willingness to comply.  All hospital discharge orders reviewed and being followed, all medications reconciled and reviewed, patient and family verbalized understanding. No other needs identified at this time.     Attestation: Screening criteria to assess the level of the patient's risk for infection with COVID-19 as recommended by the CDC at the time of the above documented home visit concluded appropriateness to proceed. Universal precautions were maintained at all times, including provider use of 60% alcohol gel hand  immediately prior to entry and upon departing the patient's home      Review of Systems   Constitutional: Negative for activity change, fatigue and unexpected weight change.   HENT: Negative for congestion, dental problem and trouble swallowing.    Eyes: Positive for visual disturbance. Negative for redness.   Respiratory: Negative for cough, shortness of breath and wheezing.    Cardiovascular: Negative for chest pain and palpitations.   Gastrointestinal: Negative for abdominal distention, abdominal pain, nausea and vomiting.   Endocrine: Negative for polydipsia and polyuria.   Genitourinary: Negative for difficulty urinating and dysuria.   Musculoskeletal: Positive for gait problem. Negative for arthralgias.   Skin: Negative for color change and rash.        Nodule on left hand pointer finger   Allergic/Immunologic: Negative for food allergies.   Neurological: Positive for weakness. Negative for dizziness and tremors.   Psychiatric/Behavioral: Negative for agitation.       Assessments:  · Environmental: [place] in [place]  · Functional Status: Min asst with ADLs and mobility  · Safety: Fall precautions  · Nutritional:  "Cardiac diet  · Home Health/DME/Supplies: rollator    Objective:   Physical Exam  HENT:      Head: Normocephalic.      Right Ear: Tympanic membrane normal.      Left Ear: Tympanic membrane normal.      Nose: Nose normal.      Mouth/Throat:      Mouth: Mucous membranes are moist.   Eyes:      Pupils: Pupils are equal, round, and reactive to light.   Cardiovascular:      Rate and Rhythm: Normal rate.      Pulses: Normal pulses.   Pulmonary:      Effort: Pulmonary effort is normal.      Breath sounds: Normal breath sounds.   Abdominal:      General: Abdomen is flat.      Palpations: Abdomen is soft.   Musculoskeletal:         General: Normal range of motion.      Cervical back: Normal range of motion.   Skin:     General: Skin is warm.      Capillary Refill: Capillary refill takes less than 2 seconds.   Neurological:      General: No focal deficit present.      Mental Status: She is alert.   Psychiatric:         Mood and Affect: Mood normal.         Vitals:    03/02/22 1300   BP: 126/71   Pulse: 69   Resp: 18   Temp: 97.8 °F (36.6 °C)   SpO2: 98%   Weight: 73.5 kg (162 lb)   Height: 5' 4" (1.626 m)   PainSc: 0-No pain     Body mass index is 27.81 kg/m².    Assessment:   Audrey was seen today for establish care.    Diagnoses and all orders for this visit:    Debility  -     Ambulatory referral/consult to [facility]    Controlled type 2 diabetes mellitus with stage 2 chronic kidney disease, without long-term current use of insulin  -     Ambulatory referral/consult to [facility]    Primary hypertension  -     Ambulatory referral/consult to [facility]    Skin Lesion  Patient does not want to see MD or does not want further treatment for at this time, will monitor    Plan:     Ethical / Legal: Advance Care Planning   · Surrogate decision maker:  Name Hugo Veliz, Relationship: Daughter  · Code Status: Full code  · [place]:  " Discussed with daughter  · Other advance directive:  discussed       Audrey was seen today for establish care.    Diagnoses and all orders for this visit:  Debility  -     [facility] - Medical & Palliative    Controlled type 2 diabetes mellitus with stage 2 chronic kidney disease, without long-term current use of insulin  -     Ambulatory referral/consult to [facility]    Primary hypertension  -     Ambulatory referral/consult to [facility]    Skin Lesion  Patient does not want to see MD or does not want further treatment for at this time, will monitor       Were controlled substances prescribed?  No    Follow Up Appointments:   No future appointments.    Signature:  Hoda Bae, NP    Patient consent obtained prior to treatment at this visit.

## 2022-04-05 ENCOUNTER — PATIENT MESSAGE (OUTPATIENT)
Dept: DERMATOLOGY | Facility: CLINIC | Age: 87
End: 2022-04-05
Payer: MEDICARE

## 2022-04-05 ENCOUNTER — PATIENT MESSAGE (OUTPATIENT)
Dept: OPHTHALMOLOGY | Facility: CLINIC | Age: 87
End: 2022-04-05
Payer: MEDICARE

## 2022-04-05 ENCOUNTER — TELEPHONE (OUTPATIENT)
Dept: DERMATOLOGY | Facility: CLINIC | Age: 87
End: 2022-04-05
Payer: MEDICARE

## 2022-04-05 DIAGNOSIS — L08.9 SKIN INFECTION: Primary | ICD-10-CM

## 2022-04-06 ENCOUNTER — PATIENT MESSAGE (OUTPATIENT)
Dept: DERMATOLOGY | Facility: CLINIC | Age: 87
End: 2022-04-06
Payer: MEDICARE

## 2022-04-06 RX ORDER — MUPIROCIN 20 MG/G
OINTMENT TOPICAL 2 TIMES DAILY
Qty: 22 G | Refills: 1 | Status: SHIPPED | OUTPATIENT
Start: 2022-04-06 | End: 2022-12-12

## 2022-04-15 ENCOUNTER — PATIENT MESSAGE (OUTPATIENT)
Dept: DERMATOLOGY | Facility: CLINIC | Age: 87
End: 2022-04-15
Payer: MEDICARE

## 2022-04-18 ENCOUNTER — PATIENT MESSAGE (OUTPATIENT)
Dept: OPHTHALMOLOGY | Facility: CLINIC | Age: 87
End: 2022-04-18
Payer: MEDICARE

## 2022-05-12 ENCOUNTER — CARE AT HOME (OUTPATIENT)
Dept: HOME HEALTH SERVICES | Facility: CLINIC | Age: 87
End: 2022-05-12
Payer: MEDICARE

## 2022-05-12 DIAGNOSIS — Z51.5 PALLIATIVE CARE ENCOUNTER: Primary | ICD-10-CM

## 2022-05-12 PROCEDURE — 99349 PR HOME VISIT,ESTAB PATIENT,LEVEL III: ICD-10-PCS | Mod: S$GLB,,, | Performed by: NURSE PRACTITIONER

## 2022-05-12 PROCEDURE — 99349 HOME/RES VST EST MOD MDM 40: CPT | Mod: S$GLB,,, | Performed by: NURSE PRACTITIONER

## 2022-05-16 NOTE — PROGRESS NOTES
"[facility]  Medical Home Visit    Visit Date: 2022  Encounter Provider: Hoda Bae NP  PCP:  Jia Borjas MD (Inactive)    Subjective:      Patient ID: Audrey Lama is a 89 y.o. female.    Consult Requested By:  No ref. provider found  Reason for Consult:  Establish Home Care    Audrey is being seen at home due to  physical debility that presents a taxing effort to leave the home, to mitigate high risk of hospital readmission or due to the limited availability of reliable or safe options for transportation to the point of access to the provider. Prior to treatment on this visit the chart was reviewed and patient consent was obtained.        Chief Complaint: Follow-up    Follow-up  Associated symptoms include weakness. Pertinent negatives include no abdominal pain, arthralgias, chest pain, congestion, coughing, fatigue, nausea, rash or vomiting.    Audrey Lama is a 89 year old female with a past medical history of Diabetes mellitus, Chronic kidney disease, Hypertension, Hyperlipidemia, Major Depression Disorder, and a History of Ovarian cancer.     With this visit today patient is found sitting in her room at [facility] where she resides. Patient is AAOx3, able to verify her name and , facility staff is present. Most of patients other history was received from her daughter over the phone at previous visit and her medical record. She currently sees  Dr. Borjas as her PCP but has not had an appointment since the COVID pandemic. I will continue seeing the patient in the home as it is difficult for him to physically make visits. She endorses eating x 3 meals per day cooked by the facilty. She endorses regular BMs .    She has a nodule on her left pointer finger in which she denies pain and reports she does not want to see a specialist or MD for it stating, "Let me live with it and Die with it." Her daughter is aware with no additional concerns. The nodule does appear to be " decreasing in size. As per facility staff, Ms Lama is doing well and has no acute issues to address today.     VSS. Denies fever, chest pain, shortness of breath, nausea, vomiting, diarrhea. Risks of environmental exposure to coronavirus discussed including: social distancing, hand hygiene, and limiting departures from the home for necessities only.  Reports understanding and willingness to comply.  All hospital discharge orders reviewed and being followed, all medications reconciled and reviewed, patient and family verbalized understanding. No other needs identified at this time.     Attestation: Screening criteria to assess the level of the patient's risk for infection with COVID-19 as recommended by the CDC at the time of the above documented home visit concluded appropriateness to proceed. Universal precautions were maintained at all times, including provider use of 60% alcohol gel hand  immediately prior to entry and upon departing the patient's home      Review of Systems   Constitutional: Negative for activity change, fatigue and unexpected weight change.   HENT: Negative for congestion, dental problem and trouble swallowing.    Eyes: Positive for visual disturbance. Negative for redness.   Respiratory: Negative for cough, shortness of breath and wheezing.    Cardiovascular: Negative for chest pain and palpitations.   Gastrointestinal: Negative for abdominal distention, abdominal pain, nausea and vomiting.   Endocrine: Negative for polydipsia and polyuria.   Genitourinary: Negative for difficulty urinating and dysuria.   Musculoskeletal: Positive for gait problem. Negative for arthralgias.   Skin: Negative for color change and rash.        Nodule on left hand pointer finger   Allergic/Immunologic: Negative for food allergies.   Neurological: Positive for weakness. Negative for dizziness and tremors.   Psychiatric/Behavioral: Negative for agitation.       Assessments:  · Environmental: [place] in  [place]  · Functional Status: Min asst with ADLs and mobility  · Safety: Fall precautions  · Nutritional: Cardiac diet  · Home Health/DME/Supplies: rollator    Objective:   Physical Exam  HENT:      Head: Normocephalic.      Right Ear: Tympanic membrane normal.      Left Ear: Tympanic membrane normal.      Nose: Nose normal.      Mouth/Throat:      Mouth: Mucous membranes are moist.   Eyes:      Pupils: Pupils are equal, round, and reactive to light.   Cardiovascular:      Rate and Rhythm: Normal rate.      Pulses: Normal pulses.   Pulmonary:      Effort: Pulmonary effort is normal.      Breath sounds: Normal breath sounds.   Abdominal:      General: Abdomen is flat.      Palpations: Abdomen is soft.   Musculoskeletal:         General: Normal range of motion.      Cervical back: Normal range of motion.   Skin:     General: Skin is warm.      Capillary Refill: Capillary refill takes less than 2 seconds.   Neurological:      General: No focal deficit present.      Mental Status: She is alert.   Psychiatric:         Mood and Affect: Mood normal.         Vitals:    05/12/22 1600   BP: 120/73   Pulse: 76   Resp: 18   Temp: 97.3 °F (36.3 °C)   SpO2: 98%   PainSc: 0-No pain     There is no height or weight on file to calculate BMI.    Assessment:   Audrey was seen today for establish care.    Diagnoses and all orders for this visit:    Debility  -     Ambulatory referral/consult to [facility]    Controlled type 2 diabetes mellitus with stage 2 chronic kidney disease, without long-term current use of insulin  -     Ambulatory referral/consult to [facility]    Primary hypertension  -     Ambulatory referral/consult to [facility]    Skin Lesion  Patient does not want to see MD or does not want further treatment for at this time, will monitor    Plan:     Ethical / Legal: Advance Care Planning   · Surrogate decision maker:  Name Hugo  Jose Alfredo, Relationship: Daughter  · Code Status: Full code  · [place]:  Discussed with daughter  · Other advance directive:  discussed       Audrey was seen today for establish care.    Diagnoses and all orders for this visit:  Debility  -     Ambulatory referral/consult to [facility]    Controlled type 2 diabetes mellitus with stage 2 chronic kidney disease, without long-term current use of insulin  -     [facility]    Primary hypertension  -     Ambulatory referral/consult to [facility]    Skin Lesion  Patient does not want to see MD or does not want further treatment for at this time, will monitor       Were controlled substances prescribed?  No    Follow Up Appointments:   No future appointments.    Signature:  Hoda Bae NP    Patient consent obtained prior to treatment at this visit.

## 2022-05-17 ENCOUNTER — PATIENT MESSAGE (OUTPATIENT)
Dept: HOME HEALTH SERVICES | Facility: CLINIC | Age: 87
End: 2022-05-17
Payer: MEDICARE

## 2022-05-17 ENCOUNTER — PATIENT MESSAGE (OUTPATIENT)
Dept: INTERNAL MEDICINE | Facility: CLINIC | Age: 87
End: 2022-05-17
Payer: MEDICARE

## 2022-05-17 DIAGNOSIS — I10 PRIMARY HYPERTENSION: Primary | ICD-10-CM

## 2022-05-17 DIAGNOSIS — E11.22 CONTROLLED TYPE 2 DIABETES MELLITUS WITH STAGE 2 CHRONIC KIDNEY DISEASE, WITHOUT LONG-TERM CURRENT USE OF INSULIN: ICD-10-CM

## 2022-05-17 DIAGNOSIS — R53.81 DEBILITY: ICD-10-CM

## 2022-05-17 DIAGNOSIS — N18.2 CONTROLLED TYPE 2 DIABETES MELLITUS WITH STAGE 2 CHRONIC KIDNEY DISEASE, WITHOUT LONG-TERM CURRENT USE OF INSULIN: ICD-10-CM

## 2022-05-23 VITALS
RESPIRATION RATE: 18 BRPM | TEMPERATURE: 97 F | SYSTOLIC BLOOD PRESSURE: 120 MMHG | DIASTOLIC BLOOD PRESSURE: 73 MMHG | OXYGEN SATURATION: 98 % | HEART RATE: 76 BPM

## 2022-05-25 ENCOUNTER — PATIENT MESSAGE (OUTPATIENT)
Dept: INTERNAL MEDICINE | Facility: CLINIC | Age: 87
End: 2022-05-25
Payer: MEDICARE

## 2022-05-25 ENCOUNTER — PES CALL (OUTPATIENT)
Dept: ADMINISTRATIVE | Facility: CLINIC | Age: 87
End: 2022-05-25
Payer: MEDICARE

## 2022-06-08 ENCOUNTER — CARE AT HOME (OUTPATIENT)
Dept: HOME HEALTH SERVICES | Facility: CLINIC | Age: 87
End: 2022-06-08
Payer: MEDICARE

## 2022-06-08 DIAGNOSIS — R53.81 DEBILITY: ICD-10-CM

## 2022-06-08 DIAGNOSIS — E11.22 CONTROLLED TYPE 2 DIABETES MELLITUS WITH STAGE 2 CHRONIC KIDNEY DISEASE, WITHOUT LONG-TERM CURRENT USE OF INSULIN: ICD-10-CM

## 2022-06-08 DIAGNOSIS — E11.22 CONTROLLED TYPE 2 DIABETES MELLITUS WITH STAGE 3 CHRONIC KIDNEY DISEASE, WITHOUT LONG-TERM CURRENT USE OF INSULIN: ICD-10-CM

## 2022-06-08 DIAGNOSIS — I10 PRIMARY HYPERTENSION: ICD-10-CM

## 2022-06-08 DIAGNOSIS — F32.0 CURRENT MILD EPISODE OF MAJOR DEPRESSIVE DISORDER WITHOUT PRIOR EPISODE: ICD-10-CM

## 2022-06-08 DIAGNOSIS — N18.2 CONTROLLED TYPE 2 DIABETES MELLITUS WITH STAGE 2 CHRONIC KIDNEY DISEASE, WITHOUT LONG-TERM CURRENT USE OF INSULIN: ICD-10-CM

## 2022-06-08 DIAGNOSIS — N18.30 CONTROLLED TYPE 2 DIABETES MELLITUS WITH STAGE 3 CHRONIC KIDNEY DISEASE, WITHOUT LONG-TERM CURRENT USE OF INSULIN: ICD-10-CM

## 2022-06-08 PROCEDURE — 99349 HOME/RES VST EST MOD MDM 40: CPT | Mod: S$GLB,,, | Performed by: NURSE PRACTITIONER

## 2022-06-08 PROCEDURE — 99349 PR HOME VISIT,ESTAB PATIENT,LEVEL III: ICD-10-PCS | Mod: S$GLB,,, | Performed by: NURSE PRACTITIONER

## 2022-06-13 VITALS
BODY MASS INDEX: 27.66 KG/M2 | OXYGEN SATURATION: 96 % | WEIGHT: 162 LBS | RESPIRATION RATE: 18 BRPM | DIASTOLIC BLOOD PRESSURE: 70 MMHG | HEART RATE: 71 BPM | SYSTOLIC BLOOD PRESSURE: 124 MMHG | HEIGHT: 64 IN | TEMPERATURE: 98 F

## 2022-06-13 NOTE — PATIENT INSTRUCTIONS
Instructions:  - [facility] Nurse Practitioner to schedule home follow-up visit with patient in as needed.  - Continue all medications, treatments and therapies as ordered.   - Follow all instructions, recommendations as discussed.  - Maintain Safety Precautions at all times.  - Attend all medical appointments as scheduled.  - For worsening symptoms: call Primary Care Physician or Nurse Practitioner.  - For emergencies, call 911 or immediately report to the nearest emergency room.  - Limit Risks of environmental exposure to coronavirus/COVID-19 as discussed including: social distancing, hand hygiene, and limiting departures from the home for necessities only.

## 2022-06-13 NOTE — PROGRESS NOTES
[facility]  Medical Home Visit    Visit Date: 2022  Encounter Provider: Hoda Bae, NP  PCP:  Jia Borjas MD (Inactive)    Subjective:      Patient ID: Audrey Lama is a 89 y.o. female.    Consult Requested By:  Dr. Sandra Cummings  Reason for Consult:  Establish Home Care    Audrey is being seen at home due to  physical debility that presents a taxing effort to leave the home, to mitigate high risk of hospital readmission or due to the limited availability of reliable or safe options for transportation to the point of access to the provider. Prior to treatment on this visit the chart was reviewed and patient consent was obtained.        Chief Complaint: Follow-up    Follow-up  Associated symptoms include weakness. Pertinent negatives include no abdominal pain, arthralgias, chest pain, congestion, coughing, fatigue, nausea, rash or vomiting.    Audrey Lama is a 89 year old female with a past medical history of Diabetes mellitus, Chronic kidney disease, Hypertension, Hyperlipidemia, Major Depression Disorder, and a History of Ovarian cancer.     With this visit today patient is found in her room at [facility] where she resides. She appears to be resting well in bed. Patient is AAOx3, able to verify her name and . Most of patients other history was received from her daughter over the phone at previous visit and her medical record. She currently sees  Dr. Borjas as her PCP but has not had an appointment since the COVID pandemic, patient also expresses she does not want to leave the facility for an appointment. I will refer patient to [facility] to establish PCP. I will continue seeing the patient in the home as it is difficult for him to physically make visits. She endorses eating x 3 meals per day cooked by the facilty. She endorses regular BMs . I spoke with facility staff who has some concerns about her medication refills, all meds refilled appropriately.  "    She has a nodule on her left pointer finger in which she denies pain and reports she does not want to see a specialist or MD for it stating, "Let me live with it and Die with it." Her daughter is aware with no additional concerns. The nodule does appear to be decreasing in size. As per facility staff, Ms Lama is doing well and has no acute issues to address today.     VSS. Denies fever, chest pain, shortness of breath, nausea, vomiting, diarrhea. Risks of environmental exposure to coronavirus discussed including: social distancing, hand hygiene, and limiting departures from the home for necessities only.  Reports understanding and willingness to comply.  All hospital discharge orders reviewed and being followed, all medications reconciled and reviewed, patient and family verbalized understanding. No other needs identified at this time.     Attestation: Screening criteria to assess the level of the patient's risk for infection with COVID-19 as recommended by the CDC at the time of the above documented home visit concluded appropriateness to proceed. Universal precautions were maintained at all times, including provider use of 60% alcohol gel hand  immediately prior to entry and upon departing the patient's home      Review of Systems   Constitutional: Negative for activity change, fatigue and unexpected weight change.   HENT: Negative for congestion, dental problem and trouble swallowing.    Eyes: Positive for visual disturbance. Negative for redness.   Respiratory: Negative for cough, shortness of breath and wheezing.    Cardiovascular: Negative for chest pain and palpitations.   Gastrointestinal: Negative for abdominal distention, abdominal pain, nausea and vomiting.   Endocrine: Negative for polydipsia and polyuria.   Genitourinary: Negative for difficulty urinating and dysuria.   Musculoskeletal: Positive for gait problem. Negative for arthralgias.   Skin: Negative for color change and rash.     " "   Nodule on left hand pointer finger   Allergic/Immunologic: Negative for food allergies.   Neurological: Positive for weakness. Negative for dizziness and tremors.   Psychiatric/Behavioral: Negative for agitation.       Assessments:  · Environmental: Lives in senior living  · Functional Status: Min asst with ADLs and mobility  · Safety: Fall precautions  · Nutritional: Cardiac diet  · Home Health/DME/Supplies: rollator    Objective:   Physical Exam  HENT:      Head: Normocephalic.      Right Ear: Tympanic membrane normal.      Left Ear: Tympanic membrane normal.      Nose: Nose normal.      Mouth/Throat:      Mouth: Mucous membranes are moist.   Eyes:      Pupils: Pupils are equal, round, and reactive to light.   Cardiovascular:      Rate and Rhythm: Normal rate.      Pulses: Normal pulses.   Pulmonary:      Effort: Pulmonary effort is normal.      Breath sounds: Normal breath sounds.   Abdominal:      General: Abdomen is flat.      Palpations: Abdomen is soft.   Musculoskeletal:         General: Normal range of motion.      Cervical back: Normal range of motion.   Skin:     General: Skin is warm.      Capillary Refill: Capillary refill takes less than 2 seconds.   Neurological:      General: No focal deficit present.      Mental Status: She is alert.   Psychiatric:         Mood and Affect: Mood normal.         Vitals:    06/08/22 1000   BP: 124/70   Pulse: 71   Resp: 18   Temp: 97.8 °F (36.6 °C)   SpO2: 96%   Weight: 73.5 kg (162 lb)   Height: 5' 4" (1.626 m)   PainSc: 0-No pain     Body mass index is 27.81 kg/m².    Assessment:   Audrey was seen today for [facility] care.    Diagnoses and all orders for this visit:    Debility  -     Ambulatory referral/consult to [facility] - [facility]    Controlled type 2 diabetes mellitus with stage 2 chronic kidney disease, without long-term current use of insulin  -     Ambulatory referral/consult to [facility] - [facility] & Palliative    Primary " hypertension  -     Ambulatory referral/consult to [facility]    Skin Lesion  Patient does not want to see MD or does not want further treatment for at this time, will monitor    Plan:     Ethical / Legal: Advance Care Planning   · Surrogate decision maker:  Name Hugo Veliz, Relationship: Daughter  · Code Status: Full code  · LaPOST:  Discussed with daughter  · Other advance directive:  discussed       Audrey was seen today for establish care.    Diagnoses and all orders for this visit:  Debility  -     Ambulatory referral/consult to [facility]    Controlled type 2 diabetes mellitus with stage 2 chronic kidney disease, without long-term current use of insulin  -     Ambulatory referral/consult to [facility]    Primary hypertension  -     Ambulatory referral/consult to [facility]    Skin Lesion  Patient does not want to see MD or does not want further treatment for at this time, will monitor       Were controlled substances prescribed?  No    Follow Up Appointments:   Future Appointments   Date Time Provider Department Center   7/7/2022  8:00 AM Hoda Bae, JADEN [id number] [place]       Signature:  Hoda Bae, JADEN    Patient consent obtained prior to treatment at this visit.

## 2022-06-30 ENCOUNTER — PATIENT MESSAGE (OUTPATIENT)
Dept: DERMATOLOGY | Facility: CLINIC | Age: 87
End: 2022-06-30
Payer: MEDICARE

## 2022-06-30 DIAGNOSIS — D48.5 NEOPLASM OF UNCERTAIN BEHAVIOR OF SKIN: Primary | ICD-10-CM

## 2022-07-07 ENCOUNTER — CARE AT HOME (OUTPATIENT)
Dept: HOME HEALTH SERVICES | Facility: CLINIC | Age: 87
End: 2022-07-07
Payer: MEDICARE

## 2022-07-07 VITALS — RESPIRATION RATE: 18 BRPM | OXYGEN SATURATION: 98 % | HEART RATE: 73 BPM | TEMPERATURE: 98 F

## 2022-07-07 DIAGNOSIS — F32.0 CURRENT MILD EPISODE OF MAJOR DEPRESSIVE DISORDER WITHOUT PRIOR EPISODE: ICD-10-CM

## 2022-07-07 DIAGNOSIS — E11.22 CONTROLLED TYPE 2 DIABETES MELLITUS WITH STAGE 3 CHRONIC KIDNEY DISEASE, WITHOUT LONG-TERM CURRENT USE OF INSULIN: ICD-10-CM

## 2022-07-07 DIAGNOSIS — N18.30 CONTROLLED TYPE 2 DIABETES MELLITUS WITH STAGE 3 CHRONIC KIDNEY DISEASE, WITHOUT LONG-TERM CURRENT USE OF INSULIN: ICD-10-CM

## 2022-07-07 DIAGNOSIS — Z51.5 PALLIATIVE CARE ENCOUNTER: Primary | ICD-10-CM

## 2022-07-07 PROCEDURE — 99349 PR HOME VISIT,ESTAB PATIENT,LEVEL III: ICD-10-PCS | Mod: S$GLB,,, | Performed by: NURSE PRACTITIONER

## 2022-07-07 PROCEDURE — 99349 HOME/RES VST EST MOD MDM 40: CPT | Mod: S$GLB,,, | Performed by: NURSE PRACTITIONER

## 2022-07-07 NOTE — PROGRESS NOTES
[facility] [place]  Palliative Home Visit    Visit Date: 2022  Encounter Provider: Hoda Bae, NP  PCP:  Jia Borjas MD (Inactive)    Subjective:      Patient ID: Audrey Lama is a 89 y.o. female.    Consult Requested By:  No ref. provider found  Reason for Consult:  Palliative care home visit    Audrey is being seen at home due to  physical debility that presents a taxing effort to leave the home, to mitigate high risk of hospital readmission or due to the limited availability of reliable or safe options for transportation to the point of access to the provider. Prior to treatment on this visit the chart was reviewed and patient consent was obtained.        Chief Complaint: Follow-up    Follow-up  Associated symptoms include weakness. Pertinent negatives include no abdominal pain, arthralgias, chest pain, congestion, coughing, fatigue, nausea, rash or vomiting.      Audrey Lama is a 89 year old female with a past medical history of Diabetes mellitus, Chronic kidney disease, Hypertension, Hyperlipidemia, Major Depression Disorder, and a History of Ovarian cancer.     With this visit today patient is found in her room at [facility] where she resides, her daughter is present for the visit. She appears to be resting well sitting up in her recliner chair. Patient is AAOx3, able to verify her name and . Most of patients other history was received from her daughter over the phone at previous visit and her medical record. She currently sees  Dr. Borjas as her PCP but has not had an appointment since the COVID pandemic, patient also expresses she does not want to leave the facility for an appointment. I will refer patient to [facility] to establish PCP. I will continue seeing the patient in the home as it is difficult for him to physically make visits. She endorses eating x 3 meals per day cooked by the facilty. She endorses regular BMs . I spoke with facility staff who  has some concerns about her medication refills, all meds refilled appropriately.     She has a nodule on her right hand pointer finger in which she reports  Pain, it has increased in size since our last visit. Her daughter is aware, we notified ortho office for appointment. As per facility staff, Ms Lama is doing well and has no acute issues to address today.     VSS. Denies fever, chest pain, shortness of breath, nausea, vomiting, diarrhea. Risks of environmental exposure to coronavirus discussed including: social distancing, hand hygiene, and limiting departures from the home for necessities only.  Reports understanding and willingness to comply.  All hospital discharge orders reviewed and being followed, all medications reconciled and reviewed, patient and family verbalized understanding. No other needs identified at this time.     Attestation: Screening criteria to assess the level of the patient's risk for infection with COVID-19 as recommended by the CDC at the time of the above documented home visit concluded appropriateness to proceed. Universal precautions were maintained at all times, including provider use of 60% alcohol gel hand  immediately prior to entry and upon departing the patient's home      Review of Systems   Constitutional: Negative for activity change, fatigue and unexpected weight change.   HENT: Negative for congestion, dental problem and trouble swallowing.  Hard of hearing  Eyes: Positive for visual disturbance. Negative for redness.   Respiratory: Negative for cough, shortness of breath and wheezing.    Cardiovascular: Negative for chest pain and palpitations.   Gastrointestinal: Negative for abdominal distention, abdominal pain, nausea and vomiting.   Endocrine: Negative for polydipsia and polyuria.   Genitourinary: Negative for difficulty urinating and dysuria.   Musculoskeletal: Positive for gait problem. Negative for arthralgias.   Skin: Negative for color change and  rash.        Nodule on right hand pointer finger   Allergic/Immunologic: Negative for food allergies.   Neurological: Positive for weakness. Negative for dizziness and tremors.   Psychiatric/Behavioral: Negative for agitation.       Assessments:  · Environmental: Lives in [facility]  · Functional Status: Min asst with ADLs and mobility  · Safety: Fall precautions  · Nutritional: Cardiac diet  · Home Health/DME/Supplies: rollator    Objective:   Physical Exam  HENT:      Head: Normocephalic.      Right Ear: Tympanic membrane normal.      Left Ear: Tympanic membrane normal.      Nose: Nose normal.      Mouth/Throat:      Mouth: Mucous membranes are moist.   Eyes:      Pupils: Pupils are equal, round, and reactive to light.   Cardiovascular:      Rate and Rhythm: Normal rate.      Pulses: Normal pulses.   Pulmonary:      Effort: Pulmonary effort is normal.      Breath sounds: Normal breath sounds.   Abdominal:      General: Abdomen is flat.          Palpations: Abdomen is soft.   Musculoskeletal:         General: Normal range of motion.      Cervical back: Normal range of motion.   Skin:     General: Skin is warm.      Capillary Refill: Capillary refill takes less than 2 seconds.     Neurological:      General: No focal deficit present.      Mental Status: She is alert.   Psychiatric:         Mood and Affect: Mood normal.         Vitals:    07/07/22 1230   Pulse: 73   Resp: 18   Temp: 97.9 °F (36.6 °C)   SpO2: 98%   PainSc: 0-No pain     There is no height or weight on file to calculate BMI.    Assessment:   Audrey was seen today for establish care.    Diagnoses and all orders for this visit:    Debility  -     Ambulatory referral/consult to [facility] - [facility] & Palliative    Controlled type 2 diabetes mellitus with stage 2 chronic kidney disease, without long-term current use of insulin  -     Ambulatory referral/consult to [facility] - [facility] & Palliative    Primary hypertension  -     Ambulatory  referral/consult to [facility]    Skin Lesion  Dr. Kerns 8/15/2022    Plan:     Ethical / Legal: Advance Care Planning   · Surrogate decision maker:  Name Hugo Veliz, Relationship: Daughter  · Code Status: Full code  · LaPOST:  Discussed with daughter  · Other advance directive:  discussed       Audrey was seen today for establish care.    Diagnoses and all orders for this visit:  Debility  -     [facility] - [facility] Palliative    Controlled type 2 diabetes mellitus with stage 2 chronic kidney disease, without long-term current use of insulin  -     Ambulatory referral/consult to [facility]    Primary hypertension  -     Ambulatory referral/consult to [facility]    Skin Lesion  Dr. Kerns 8/15/2022       Were controlled substances prescribed?  No    Follow Up Appointments:   Future Appointments   Date Time Provider Department Center   8/15/2022 10:50 AM Zion Kerns Jr., MD [facility]       Signature:  Hoda Bae, NP    Patient consent obtained prior to treatment at this visit.

## 2022-07-07 NOTE — PATIENT INSTRUCTIONS
Instructions:  - [facility] Practitioner to schedule home follow-up visit with patient in 4-6 weeks or as needed.  - Continue all medications, treatments and therapies as ordered.   - Follow all instructions, recommendations as discussed.  - Maintain Safety Precautions at all times.  - Attend all medical appointments as scheduled.  - For worsening symptoms: call Primary Care Physician or Nurse Practitioner.  - For emergencies, call 911 or immediately report to the nearest emergency room.  - Limit Risks of environmental exposure to coronavirus/COVID-19 as discussed including: social distancing, hand hygiene, and limiting departures from the home for necessities only.

## 2022-07-18 ENCOUNTER — PATIENT MESSAGE (OUTPATIENT)
Dept: DERMATOLOGY | Facility: CLINIC | Age: 87
End: 2022-07-18
Payer: MEDICARE

## 2022-07-18 ENCOUNTER — PATIENT MESSAGE (OUTPATIENT)
Dept: HOME HEALTH SERVICES | Facility: CLINIC | Age: 87
End: 2022-07-18
Payer: MEDICARE

## 2022-07-18 DIAGNOSIS — L72.3 SEBACEOUS CYST OF FINGER: Primary | ICD-10-CM

## 2022-08-11 ENCOUNTER — TELEPHONE (OUTPATIENT)
Dept: DERMATOLOGY | Facility: CLINIC | Age: 87
End: 2022-08-11
Payer: MEDICARE

## 2022-08-11 NOTE — TELEPHONE ENCOUNTER
Spoke with pt's daughter and canceled appointment for 11/25. Pt's daughter stated she will contact the office to reschedule appointment.

## 2022-08-18 ENCOUNTER — CARE AT HOME (OUTPATIENT)
Dept: HOME HEALTH SERVICES | Facility: CLINIC | Age: 87
End: 2022-08-18
Payer: MEDICARE

## 2022-08-18 DIAGNOSIS — F32.0 CURRENT MILD EPISODE OF MAJOR DEPRESSIVE DISORDER WITHOUT PRIOR EPISODE: ICD-10-CM

## 2022-08-18 DIAGNOSIS — N18.30 CONTROLLED TYPE 2 DIABETES MELLITUS WITH STAGE 3 CHRONIC KIDNEY DISEASE, WITHOUT LONG-TERM CURRENT USE OF INSULIN: ICD-10-CM

## 2022-08-18 DIAGNOSIS — E11.22 CONTROLLED TYPE 2 DIABETES MELLITUS WITH STAGE 3 CHRONIC KIDNEY DISEASE, WITHOUT LONG-TERM CURRENT USE OF INSULIN: ICD-10-CM

## 2022-08-18 PROCEDURE — 99349 PR HOME VISIT,ESTAB PATIENT,LEVEL III: ICD-10-PCS | Mod: S$GLB,,, | Performed by: NURSE PRACTITIONER

## 2022-08-18 PROCEDURE — 99349 HOME/RES VST EST MOD MDM 40: CPT | Mod: S$GLB,,, | Performed by: NURSE PRACTITIONER

## 2022-08-22 NOTE — PROGRESS NOTES
[facility]  Palliative Home Visit    Visit Date: 2022  Encounter Provider: Hoda Bae NP  PCP:  Jia Borjas    Subjective:      Patient ID: Audrey Lama is a 89 y.o. female.    Consult Requested By:    Reason for Consult:  Palliative care home visit    Audrey is being seen at home due to  physical debility that presents a taxing effort to leave the home, to mitigate high risk of hospital readmission or due to the limited availability of reliable or safe options for transportation to the point of access to the provider. Prior to treatment on this visit the chart was reviewed and patient consent was obtained.        Chief Complaint: Follow-up    Follow-up  Associated symptoms include weakness. Pertinent negatives include no abdominal pain, arthralgias, chest pain, congestion, coughing, fatigue, nausea, rash or vomiting.      Audrey Lama is a 89 year old female with a past medical history of Diabetes mellitus, Chronic kidney disease, Hypertension, Hyperlipidemia, Major Depression Disorder, and a History of Ovarian cancer.     With this visit today patient is found in her room at [facility] where she resides. She appears to be resting well sitting up in her recliner chair. Patient is AAOx3, able to verify her name and . Most of patients other history was received from her daughter over the phone at previous visit and her medical record. She currently sees  Dr. Borjas as her PCP but has not had an appointment since the COVID pandemic, patient also expresses she does not want to leave the facility for an appointment. I will refer patient to [facility] to establish PCP. I will continue seeing the patient in the home as it is difficult for him to physically make visits. She endorses eating x 3 meals per day cooked by the facilty. She endorses regular BMs . I spoke with facility staff who has some concerns about her medication refills, all meds refilled  appropriately.     She has a nodule on her right hand pointer finger in which she reports  Pain, it has increased in size since our last visit. Her daughter is aware, we notified ortho office for appointment. As per facility staff, Ms Lama is doing well and has no acute issues to address today.     VSS. Denies fever, chest pain, shortness of breath, nausea, vomiting, diarrhea. Risks of environmental exposure to coronavirus discussed including: social distancing, hand hygiene, and limiting departures from the home for necessities only.  Reports understanding and willingness to comply.  All hospital discharge orders reviewed and being followed, all medications reconciled and reviewed, patient and family verbalized understanding. No other needs identified at this time.     Attestation: Screening criteria to assess the level of the patient's risk for infection with COVID-19 as recommended by the CDC at the time of the above documented home visit concluded appropriateness to proceed. Universal precautions were maintained at all times, including provider use of 60% alcohol gel hand  immediately prior to entry and upon departing the patient's home      Review of Systems   Constitutional: Negative for activity change, fatigue and unexpected weight change.   HENT: Negative for congestion, dental problem and trouble swallowing.  Hard of hearing  Eyes: Positive for visual disturbance. Negative for redness.   Respiratory: Negative for cough, shortness of breath and wheezing.    Cardiovascular: Negative for chest pain and palpitations.   Gastrointestinal: Negative for abdominal distention, abdominal pain, nausea and vomiting.   Endocrine: Negative for polydipsia and polyuria.   Genitourinary: Negative for difficulty urinating and dysuria.   Musculoskeletal: Positive for gait problem. Negative for arthralgias.   Skin: Negative for color change and rash.        Nodule on right hand pointer finger  "  Allergic/Immunologic: Negative for food allergies.   Neurological: Positive for weakness. Negative for dizziness and tremors.   Psychiatric/Behavioral: Negative for agitation.       Assessments:  Environmental: Lives in shelter  Functional Status: Min asst with ADLs and mobility  Safety: Fall precautions  Nutritional: Cardiac diet  Home Health/DME/Supplies: rollator    Objective:   Physical Exam  HENT:      Head: Normocephalic.      Right Ear: Tympanic membrane normal.      Left Ear: Tympanic membrane normal.      Nose: Nose normal.      Mouth/Throat:      Mouth: Mucous membranes are moist.   Eyes:      Pupils: Pupils are equal, round, and reactive to light.   Cardiovascular:      Rate and Rhythm: Normal rate.      Pulses: Normal pulses.   Pulmonary:      Effort: Pulmonary effort is normal.      Breath sounds: Normal breath sounds.   Abdominal:      General: Abdomen is flat.        Palpations: Abdomen is soft.   Musculoskeletal:         General: Normal range of motion.      Cervical back: Normal range of motion.   Skin:     General: Skin is warm.      Capillary Refill: Capillary refill takes less than 2 seconds.     Neurological:      General: No focal deficit present.      Mental Status: She is alert.   Psychiatric:         Mood and Affect: Mood normal.         Vitals:    08/18/22 1100   Resp: 18   Temp: 98 °F (36.7 °C)   SpO2: 98%   Weight: 73.5 kg (162 lb)   Height: 5' 4" (1.626 m)     Body mass index is 27.81 kg/m².    Assessment:   Audrey was seen today for establish care.    Diagnoses and all orders for this visit:    Debility  -     Ambulatory referral/consult to [facility]    Controlled type 2 diabetes mellitus with stage 2 chronic kidney disease, without long-term current use of insulin  -     Ambulatory referral/consult to [facility]    Primary hypertension  -     Ambulatory referral/consult to [facility] Pall[facility]    Skin " Lesion  Dr. Kerns 8/15/2022    Plan:     Ethical / Legal: Advance Care Planning   Surrogate decision maker:  Name Hugo Veliz, Relationship: Daughter  Code Status: Full code  LaPOST:  Discussed with daughter  Other advance directive:  discussed       Audrey was seen today for establish care.    Diagnoses and all orders for this visit:  Debility  -     Ambulatory referral[facility]cons[facility] to [facility] - [facility] & Palliative    Controlled type 2 diabetes mellitus with stage 2 chronic kidney disease, without long-term current use of insulin  -     [facility]    Primary hypertension  -     Ambulatory referral/consult to [facility]    Skin Lesion  Dr. Kerns 8/15/2022       Were controlled substances prescribed?  No    Follow Up Appointments:   No future appointments.      Signature:  Hoda Bae NP    Patient consent obtained prior to treatment at this visit.

## 2022-08-29 VITALS
TEMPERATURE: 98 F | RESPIRATION RATE: 18 BRPM | OXYGEN SATURATION: 98 % | WEIGHT: 162 LBS | BODY MASS INDEX: 27.66 KG/M2 | HEIGHT: 64 IN

## 2022-08-30 ENCOUNTER — TELEPHONE (OUTPATIENT)
Dept: DERMATOLOGY | Facility: CLINIC | Age: 87
End: 2022-08-30
Payer: MEDICARE

## 2022-08-30 ENCOUNTER — PATIENT MESSAGE (OUTPATIENT)
Dept: HOME HEALTH SERVICES | Facility: CLINIC | Age: 87
End: 2022-08-30
Payer: MEDICARE

## 2022-08-30 NOTE — TELEPHONE ENCOUNTER
Spoke to pt's daughter about growth on index finger. Expressed that this is likely beyond Dr. Loaiza's expertise, but pt can come in for consult if she would like. Pt scheduled for 8/31 at 3:00.

## 2022-08-30 NOTE — TELEPHONE ENCOUNTER
----- Message from Saulo Garcia sent at 8/30/2022 11:34 AM CDT -----  Name Of Caller: Zoya Jasso        Provider Name: needs to est care        Does patient feel the need to be seen today? no        Relationship to the Pt?: daughter        Contact Preference?: [phone]        What is the nature of the call?: Patient's daughter states that he mother would like to schedule a consultation/appointment with this provider for a growth that she had on her finger

## 2022-08-30 NOTE — PATIENT INSTRUCTIONS
Instructions:  - [facility] Pract[facility] to schedule home follow-up visit with patient in 6-8 weeks or as needed.  - Continue all medications, treatments and therapies as ordered.   - Follow all instructions, recommendations as discussed.  - Maintain Safety Precautions at all times.  - Attend all medical appointments as scheduled.  - For worsening symptoms: call Primary Care Physician or Nurse Practitioner.  - For emergencies, call 911 or immediately report to the nearest emergency room.  - Limit Risks of environmental exposure to coronavirus/COVID-19 as discussed including: social distancing, hand hygiene, and limiting departures from the home for necessities only.

## 2022-08-31 ENCOUNTER — OFFICE VISIT (OUTPATIENT)
Dept: DERMATOLOGY | Facility: CLINIC | Age: 87
End: 2022-08-31
Payer: MEDICARE

## 2022-08-31 ENCOUNTER — PATIENT MESSAGE (OUTPATIENT)
Dept: HOME HEALTH SERVICES | Facility: CLINIC | Age: 87
End: 2022-08-31
Payer: MEDICARE

## 2022-08-31 ENCOUNTER — PATIENT MESSAGE (OUTPATIENT)
Dept: DERMATOLOGY | Facility: CLINIC | Age: 87
End: 2022-08-31

## 2022-08-31 VITALS
HEIGHT: 64 IN | BODY MASS INDEX: 27.66 KG/M2 | HEART RATE: 81 BPM | WEIGHT: 162 LBS | DIASTOLIC BLOOD PRESSURE: 58 MMHG | SYSTOLIC BLOOD PRESSURE: 117 MMHG

## 2022-08-31 DIAGNOSIS — D48.5 NEOPLASM OF UNCERTAIN BEHAVIOR OF SKIN: Primary | ICD-10-CM

## 2022-08-31 PROCEDURE — 88305 TISSUE EXAM BY PATHOLOGIST: CPT | Mod: 26,,, | Performed by: PATHOLOGY

## 2022-08-31 PROCEDURE — 99214 OFFICE O/P EST MOD 30 MIN: CPT | Mod: PBBFAC,25 | Performed by: DERMATOLOGY

## 2022-08-31 PROCEDURE — 99999 PR PBB SHADOW E&M-EST. PATIENT-LVL IV: ICD-10-PCS | Mod: PBBFAC,,, | Performed by: DERMATOLOGY

## 2022-08-31 PROCEDURE — 99499 NO LOS: ICD-10-PCS | Mod: S$PBB,,, | Performed by: DERMATOLOGY

## 2022-08-31 PROCEDURE — 11102 TANGNTL BX SKIN SINGLE LES: CPT | Mod: S$PBB,,, | Performed by: DERMATOLOGY

## 2022-08-31 PROCEDURE — 99499 UNLISTED E&M SERVICE: CPT | Mod: S$PBB,,, | Performed by: DERMATOLOGY

## 2022-08-31 PROCEDURE — 88305 TISSUE EXAM BY PATHOLOGIST: ICD-10-PCS | Mod: 26,,, | Performed by: PATHOLOGY

## 2022-08-31 PROCEDURE — 11102 TANGNTL BX SKIN SINGLE LES: CPT | Mod: PBBFAC | Performed by: DERMATOLOGY

## 2022-08-31 PROCEDURE — 99999 PR PBB SHADOW E&M-EST. PATIENT-LVL IV: CPT | Mod: PBBFAC,,, | Performed by: DERMATOLOGY

## 2022-08-31 PROCEDURE — 88305 TISSUE EXAM BY PATHOLOGIST: CPT | Performed by: PATHOLOGY

## 2022-08-31 PROCEDURE — 11102 PR TANGENTIAL BIOPSY, SKIN, SINGLE LESION: ICD-10-PCS | Mod: S$PBB,,, | Performed by: DERMATOLOGY

## 2022-08-31 NOTE — PROGRESS NOTES
REFERRING PROVIDER:  Self-referred    CHIEF COMPLAINT:  Established patient being consulted for evaluation of growth on the R index finger    HISTORY OF PRESENT ILLNESS:  89 y.o. female presents with a 2 year(s) history of growth on the R index finger. (+) growing, doubled in size. Lesion has not been biopsied yet. No prior treatment. Per daughter, patient was scheduled to see Dr Kerns in Ortho/Hand surgery but patient canceled that appointment stating she only wanted to come here.    Negative for scabbing.  Negative for crusting.  Negative for bleeding.  Negative for itching.    No prior treatment.    Pacemaker: No  Defibrillator: No  Artificial joints: No  Artificial heart valves: No    PAST MEDICAL HISTORY:  Past Medical History:   Diagnosis Date    Diabetes mellitus     Hypertension     Macular degeneration     Squamous cell carcinoma        PAST SURGICAL HISTORY:    Past Surgical History:   Procedure Laterality Date    CATARACT EXTRACTION       SECTION      HERNIA REPAIR  2011    HYSTERECTOMY  1985        SOCIAL HISTORY:  Dependencies:  never smoked    PERTINENT MEDICATIONS:  See medications list.  aspirin    ALLERGIES:  Celexa [citalopram]    ROS:  Skin: See HPI  Constitutional: No fatigue, fever, malaise, weight loss, or night sweats.  Cardiovascular: No chest pain, palpitations, or edema.  Respiratory: No coughing, wheezing, SOB, or sputum production.    Physical Exam   Skin:             General: Mood and affect normal. Alert and orient X3. Normal appearance.  Eyelids:  no suspicious lesions  RUE: R index finger with a 25 x 30 mm exophytic, deep-seated erythematous nodule with keratin-filled center, with only limited mobility of nodule itself.  No right epitrochlear nodes palpated.     IMPRESSION/PLAN:  R/O SCC, R index finger.  Debulking of tumor today to halt growth.  Etoh prep, 4 cc 0.5% Lidocaine with Epi 1:200,000 mixed 1:1 with 0.5% Bupivacaine, Hibiclens, sterile fenestrated drape, debulk  with #15 blade, light hyfrecation, AlCl, pressure bandage, no complications.   Discussed wound care.  Specimen to pathology.  Discussed deep nature of tumor as noted during debulk - will need to refer to Ortho/Hand Surgery as concern is for deep tissue margin involvement.  Stressed importance of her keeping Hand Surgery appointment and that the cancer has not been removed today.  Emphasized that today was only a diagnostic biopsy, not at all therapeutic as clinically tumor still present deeply.  Disc this with both patient and daughter.   Keep hand elevated as much as possible next few days to limit swelling and bleeding.  Will contact Hand Surgery for next available appt asap.     Pathology Orders:       Normal Orders This Visit    Specimen to Pathology, Dermatology     Questions:    Procedure Type: Dermatology and skin neoplasms    Number of Specimens: 1    ------------------------: -------------------------    Spec 1 Procedure: Biopsy    Spec 1 Clinical Impression: rule out SCC    Spec 1 Source: right index finger    Release to patient:

## 2022-09-01 ENCOUNTER — TELEPHONE (OUTPATIENT)
Dept: ORTHOPEDICS | Facility: CLINIC | Age: 87
End: 2022-09-01
Payer: MEDICARE

## 2022-09-01 ENCOUNTER — PATIENT MESSAGE (OUTPATIENT)
Dept: DERMATOLOGY | Facility: CLINIC | Age: 87
End: 2022-09-01
Payer: MEDICARE

## 2022-09-01 ENCOUNTER — PATIENT MESSAGE (OUTPATIENT)
Dept: ORTHOPEDICS | Facility: CLINIC | Age: 87
End: 2022-09-01
Payer: MEDICARE

## 2022-09-01 DIAGNOSIS — M79.644 FINGER PAIN, RIGHT: Primary | ICD-10-CM

## 2022-09-01 NOTE — TELEPHONE ENCOUNTER
Spoke c pts daughter. Confirmed appt location & time c Dr. Blackwood 09/06/22. Informed pt that XRs are needed prior to appt. Advised pt to arrive for XR appt 45 minutes prior to scheduled appt c Dr. Blackwood. Advised pt that XR is located on 1st floor of [facility] and the Hand Clinic is located on the 9th floor, [address].  Patient expressed understanding and was thankful.

## 2022-09-02 ENCOUNTER — TELEPHONE (OUTPATIENT)
Dept: ORTHOPEDICS | Facility: CLINIC | Age: 87
End: 2022-09-02
Payer: MEDICARE

## 2022-09-02 ENCOUNTER — TELEPHONE (OUTPATIENT)
Dept: DERMATOLOGY | Facility: CLINIC | Age: 87
End: 2022-09-02
Payer: MEDICARE

## 2022-09-02 NOTE — TELEPHONE ENCOUNTER
Called pt's daughter to inform her of appt with Dr. Owen on 9/6 at 1:00 and prior x-ray appt on 9/6 at 12:15. Cancelled pt's appt with Dr. Kerns on 10/3. Pt's daughter confirmed appt details.

## 2022-09-02 NOTE — TELEPHONE ENCOUNTER
----- Message from Christine Singh sent at 9/1/2022  5:05 PM CDT -----  Regarding: Pt scheduled 09/06/22  Dr. Huston,     Thank you for the referral. Pt has been scheduled to see Dr. Jaison Og next week.     Please let me know if you have any additional questions or concerns.     Thank you,     Christine Singh, MEd, ATC/L, OTC  Clinical/OR Assistant to Emilee Owen MD  [facility]  [phone] o.  [phone] fax

## 2022-09-06 ENCOUNTER — HOSPITAL ENCOUNTER (OUTPATIENT)
Dept: RADIOLOGY | Facility: OTHER | Age: 87
Discharge: HOME | End: 2022-09-06
Attending: ORTHOPAEDIC SURGERY
Payer: MEDICARE

## 2022-09-06 ENCOUNTER — OFFICE VISIT (OUTPATIENT)
Dept: ORTHOPEDICS | Facility: CLINIC | Age: 87
End: 2022-09-06
Payer: MEDICARE

## 2022-09-06 VITALS
WEIGHT: 162 LBS | DIASTOLIC BLOOD PRESSURE: 62 MMHG | HEIGHT: 64 IN | SYSTOLIC BLOOD PRESSURE: 133 MMHG | HEART RATE: 69 BPM | BODY MASS INDEX: 27.66 KG/M2

## 2022-09-06 DIAGNOSIS — M79.644 FINGER PAIN, RIGHT: ICD-10-CM

## 2022-09-06 DIAGNOSIS — L98.9 FINGER LESION: Primary | ICD-10-CM

## 2022-09-06 PROCEDURE — 73140 XR FINGER 2 OR MORE VIEWS LEFT: ICD-10-PCS | Mod: 26,LT,, | Performed by: RADIOLOGY

## 2022-09-06 PROCEDURE — 99999 PR PBB SHADOW E&M-EST. PATIENT-LVL IV: CPT | Mod: PBBFAC,,, | Performed by: ORTHOPAEDIC SURGERY

## 2022-09-06 PROCEDURE — 99999 PR PBB SHADOW E&M-EST. PATIENT-LVL IV: ICD-10-PCS | Mod: PBBFAC,,, | Performed by: ORTHOPAEDIC SURGERY

## 2022-09-06 PROCEDURE — 73140 X-RAY EXAM OF FINGER(S): CPT | Mod: TC,FY,LT

## 2022-09-06 PROCEDURE — 99214 OFFICE O/P EST MOD 30 MIN: CPT | Mod: PBBFAC | Performed by: ORTHOPAEDIC SURGERY

## 2022-09-06 PROCEDURE — 99204 PR OFFICE/OUTPT VISIT, NEW, LEVL IV, 45-59 MIN: ICD-10-PCS | Mod: S$PBB,,, | Performed by: ORTHOPAEDIC SURGERY

## 2022-09-06 PROCEDURE — 99204 OFFICE O/P NEW MOD 45 MIN: CPT | Mod: S$PBB,,, | Performed by: ORTHOPAEDIC SURGERY

## 2022-09-06 PROCEDURE — 73140 X-RAY EXAM OF FINGER(S): CPT | Mod: 26,LT,, | Performed by: RADIOLOGY

## 2022-09-07 LAB
FINAL PATHOLOGIC DIAGNOSIS: NORMAL
GROSS: NORMAL
Lab: NORMAL
MICROSCOPIC EXAM: NORMAL

## 2022-09-08 ENCOUNTER — PATIENT MESSAGE (OUTPATIENT)
Dept: DERMATOLOGY | Facility: CLINIC | Age: 87
End: 2022-09-08
Payer: MEDICARE

## 2022-09-08 ENCOUNTER — TELEPHONE (OUTPATIENT)
Dept: DERMATOLOGY | Facility: CLINIC | Age: 87
End: 2022-09-08
Payer: MEDICARE

## 2022-09-08 NOTE — TELEPHONE ENCOUNTER
Spoke with patient's daughter Zoya and informed her the results came back positive for SCC on R index finger. Pt still does not want further treatment. Advised daughter to keep finger moist and covered with Aquaphor or Vaseline and confirmed f/up appt on 10/10 at 140 pm.

## 2022-09-08 NOTE — TELEPHONE ENCOUNTER
----- Message from Otis Loaiza MD sent at 9/8/2022 11:35 AM CDT -----  Pl call pt with bx results (+) for SCC, in deep margin.  Needs further excision by Dr Owen in Hand Surgery.  Just spoke with Dr Owen who said she saw this patient in clinic and that she is refusing to have further surgery done. Please explain to patient that what we did in clinic was just to slow it down and the cancer is still there.  If she is still refusing treatment, there is not more we can do at this point as long as she understands the risks of continued growth and possible spread of cancer.  Make sure she cleans wound with soap and water and keeps it moist and covered with aquaphor and bandage.  We can see her back in 1 month to see how she is healing.    Thanks!    ----- Message -----  From: [facility]  Sent: 9/7/2022   4:09 PM CDT  To: Otis Loazia MD

## 2022-09-08 NOTE — TELEPHONE ENCOUNTER
----- Message from Otis Loaiza MD sent at 9/8/2022 11:35 AM CDT -----  Pl call pt with bx results (+) for SCC, in deep margin.  Needs further excision by Dr Owen in Hand Surgery.  Just spoke with Dr Owen who said she saw this patient in clinic and that she is refusing to have further surgery done. Please explain to patient that what we did in clinic was just to slow it down and the cancer is still there.  If she is still refusing treatment, there is not more we can do at this point as long as she understands the risks of continued growth and possible spread of cancer.  Make sure she cleans wound with soap and water and keeps it moist and covered with aquaphor and bandage.  We can see her back in 1 month to see how she is healing.    Thanks!    ----- Message -----  From: [facility]  Sent: 9/7/2022   4:09 PM CDT  To: Otis Loaiza MD

## 2022-09-08 NOTE — PROGRESS NOTES
[facility]  History & Physical  Orthopedics    SUBJECTIVE:     Chief Complaint: right index finger lesion    Referring Provider: Mihaela Rutledge MD     History of Present Illness:  Patient is a 89 y.o. right hand dominant female who presents today with complaints of right index finger lesion. Patient developed a mass to right index finger 2 years ago which initially was slow growing however over the past several months increased in size fairly rapidly.  She saw her dermatologist Dr. Loaiza who did a biopsy with debulking and sent for pathology which is still pending.  She is concern for squamous cell carcinoma and was not able to resect the entire mass in its entirety.  She does refer the patient to us for further resection and wound coverage.  The patient presents today with her daughter.  She is adamant that she is not interested in any further surgery.  She is not having any pain, numbness or tingling.  She has been doing wound care with warm soapy water and covering the wound with a Band-Aid.    Onset of symptoms/DOI was 2 years ago    The patient denies any fevers, chills, N/V, D/C and presents for evaluation.    Review of patient's allergies indicates:   Allergen Reactions    Celexa [citalopram]        Past Medical History:   Diagnosis Date    Diabetes mellitus     Hypertension     Macular degeneration     Squamous cell carcinoma      Past Surgical History:   Procedure Laterality Date    CATARACT EXTRACTION       SECTION      HERNIA REPAIR  2011    HYSTERECTOMY  1985     Family History   Problem Relation Age of Onset    Amblyopia Sister     Hypertension Sister     Cancer Son     Heart attack Son     Blindness Neg Hx     Cataracts Neg Hx     Diabetes Neg Hx     Glaucoma Neg Hx     Macular degeneration Neg Hx     Retinal detachment Neg Hx     Strabismus Neg Hx     Stroke Neg Hx     Thyroid disease Neg Hx      Social History     Tobacco Use    Smoking status: Never    Smokeless  tobacco: Never   Substance Use Topics    Alcohol use: Yes     Comment: 1 drink per month    Drug use: Never        Review of Systems:  Constitutional: Denies fever/chills  Neurological: Denies numbness/tingling (any exceptions noted in orthopaedic exam)   Psychiatric/Behavioral: Denies change in normal mood  Eyes: Denies change in vision  Cardiovascular: Denies chest pain  Respiratory: Denies shortness of breath  Hematologic/Lymphatic: Denies easy bleeding/bruising   Skin: Denies new rash or skin lesions   Gastrointestinal: Denies nausea/vomitting/diarrhea, change in bowel habits, abdominal pain   Allergic/Immunologic: Denies adverse reactions to current medications  Musculoskeletal: see HPI      OBJECTIVE:     Vital Signs (Most Recent)  Pulse: 69 (09/06/22 1254)  BP: 133/62 (09/06/22 1254)    Physical Exam:  Gen:  No acute distress  CV:  Peripherally well-perfused.  Pulses 2+ bilaterally.  Lungs:  Normal respiratory effort.  Abdomen:  Soft, non-tender, non-distended  Head/Neck:  Normocephalic.  Atraumatic. No TTP, AROM and PROM intact without pain  Neuro:  CN intact without deficit, SILT throughout B/L Upper & Lower Extremities      Right index finger  There is an approximately 4 x 4 cm wound at the dorsal aspect over P1 of her index finger.  The subcutaneous tissue is exposed, the tendon or bone  is not exposed.  No erythema, drainage or signs of infection.  Patient is nontender to palpation.  She is able to flex at her PIP PIP and MP joint but overall the finger is still and she lacks flexion compared to her other fingers.  She is able to fully extend the finger. SILT throughout. Finger tip wwp      Diagnostic Results:     Imaging - I independently viewed the patient's imaging as well as the radiology report.  Xrays of the patient's left index finger demonstrate no evidence of any acute fractures or dislocations.  She has degenerative changes most pronounced at her D IP joint.  There is no bony destruction at the  proximal phalanx which is the side of her lesion.    EMG - none    Pathology- left index finger lesion in process    ASSESSMENT/PLAN:     Audrey was seen today for injury.    Diagnoses and all orders for this visit:    Finger lesion       89 y.o. yo female with left index finger lesion dorsal aspect of P1.  This was debulked and biopsied by Dr. Loaiza 8/31/22, pathology still in process. She was concerned for SCC and referred patient to our clinic for further resection and wound coverage.  Patient is here with her daughter today.  I counseled the patient on her diagnosis and treatment options.  I explained in depth that Dr. Loaiza is concern for malignancy and while she had the fall and biopsy the tumor she did not fully resected for clear margins.  That is why the patient was referred to us for further resection.  The patient adamantly states she is not interested in any further surgeries.  I explained to her that even if she can see the remaining tumor this could still spread locally and systemically ultimately resulting in death.  Patient states that she understands this and is willing to accept this risk.  I answered all of the patient's and her daughter's questions, and encouraged them to call my office or schedule another appointment if any other questions arise or if the patient is willing to reconsider surgery.      Plan:  - recommended surgery for further tumor resection and wound coverage. Patient adamently declines  - will message Dr. Loaiza to update her on our visit with the patient today  - continue local wound care with warm soapy water and soft dressing  - call office or follow up in clinic if any further questions arise or if patient willing to reconsider surgery

## 2022-09-11 NOTE — PROGRESS NOTES
I have personally taken the history and examined this patient. I agree with the resident's note as stated above.   Had a long detailed discussion with the patient and the patient's daughter per Dr. Lerma is note which I showed to the patient the patient's daughter that there was concern that there was squamous cell carcinoma still remaining in the tissue bed patient had had this lesion for several years it was very large at the time of her Mohs surgery though the mass was significantly debulked there was still cells that are remaining I discussed this with the daughter and the patient the patient became tearful she was adamant that she did not want any further surgery she states of 89 years old I do not want to go through pain again I asked if her Mohs surgery was painful and she said no but I am scared that this will be discussed with be probably similar anesthesia that she would not feel it she was tearful she was adamant she states may only with 3 more years at the most and the mass that I had prior was there for much longer she felt that this was unnecessary and she refused surgery    I did discuss this with Dr. Loaiza the telephone to let her know the patient was refusing any further surgery

## 2022-09-12 ENCOUNTER — PATIENT MESSAGE (OUTPATIENT)
Dept: HOME HEALTH SERVICES | Facility: CLINIC | Age: 87
End: 2022-09-12
Payer: MEDICARE

## 2022-09-26 ENCOUNTER — PATIENT MESSAGE (OUTPATIENT)
Dept: DERMATOLOGY | Facility: CLINIC | Age: 87
End: 2022-09-26
Payer: MEDICARE

## 2022-09-28 ENCOUNTER — PATIENT MESSAGE (OUTPATIENT)
Dept: HOME HEALTH SERVICES | Facility: CLINIC | Age: 87
End: 2022-09-28
Payer: MEDICARE

## 2022-09-29 ENCOUNTER — PATIENT MESSAGE (OUTPATIENT)
Dept: HOME HEALTH SERVICES | Facility: CLINIC | Age: 87
End: 2022-09-29
Payer: MEDICARE

## 2022-09-29 ENCOUNTER — PATIENT MESSAGE (OUTPATIENT)
Dept: DERMATOLOGY | Facility: CLINIC | Age: 87
End: 2022-09-29
Payer: MEDICARE

## 2022-10-04 ENCOUNTER — PATIENT MESSAGE (OUTPATIENT)
Dept: DERMATOLOGY | Facility: CLINIC | Age: 87
End: 2022-10-04
Payer: MEDICARE

## 2022-10-10 ENCOUNTER — OFFICE VISIT (OUTPATIENT)
Dept: DERMATOLOGY | Facility: CLINIC | Age: 87
End: 2022-10-10
Payer: MEDICARE

## 2022-10-10 DIAGNOSIS — C44.622 SQUAMOUS CELL CARCINOMA OF RIGHT HAND: Primary | ICD-10-CM

## 2022-10-10 PROCEDURE — 99999 PR PBB SHADOW E&M-EST. PATIENT-LVL III: ICD-10-PCS | Mod: PBBFAC,,, | Performed by: DERMATOLOGY

## 2022-10-10 PROCEDURE — 99999 PR PBB SHADOW E&M-EST. PATIENT-LVL III: CPT | Mod: PBBFAC,,, | Performed by: DERMATOLOGY

## 2022-10-10 PROCEDURE — 99213 OFFICE O/P EST LOW 20 MIN: CPT | Mod: PBBFAC | Performed by: DERMATOLOGY

## 2022-10-10 PROCEDURE — 99213 PR OFFICE/OUTPT VISIT, EST, LEVL III, 20-29 MIN: ICD-10-PCS | Mod: S$PBB,,, | Performed by: DERMATOLOGY

## 2022-10-10 PROCEDURE — 99213 OFFICE O/P EST LOW 20 MIN: CPT | Mod: S$PBB,,, | Performed by: DERMATOLOGY

## 2022-10-10 NOTE — PROGRESS NOTES
89 y.o. female patient is here for wound check following debulking and biopsy of SCC R index finger.    Patient reports no problems.    WOUND PE:  The R index finger wound is healing with re-epithelialization.  (+) scaly papular component proximally.      IMPRESSION:  Biopsy proven SCC R index finger      PLAN:  Disc that while the area is better than before her debulking biopsy, residual disease is still present and now I am concerned about proximal regrowth.  Disc tx options  -  patient saw Dr. Owen for excision and patient declined any further treatment at that visit.   I discussed the risks of not treating again today with both her and her daughter- including continued growth, deformity, metastasis which could be fatal.  Pt understands these risks and still wishes not to pursue treatment as she claims that since she is 89 years old she would just rather let it be, even if it spreads and causes demise.   Disc that since tumor is not cleared, it will likely regrow and patient understands this.  If she changes her mind, disc that we would refer her directly to Dr. Owen to have surgical excision.   Pt and daughter verbalized understanding.      RTC:  In 3-6 months with  general dermatology  for skin check or sooner if new concern arises.

## 2022-10-13 ENCOUNTER — CARE AT HOME (OUTPATIENT)
Dept: HOME HEALTH SERVICES | Facility: CLINIC | Age: 87
End: 2022-10-13
Payer: MEDICARE

## 2022-10-13 DIAGNOSIS — E11.22 CONTROLLED TYPE 2 DIABETES MELLITUS WITH STAGE 3 CHRONIC KIDNEY DISEASE, WITHOUT LONG-TERM CURRENT USE OF INSULIN: Primary | ICD-10-CM

## 2022-10-13 DIAGNOSIS — F32.0 CURRENT MILD EPISODE OF MAJOR DEPRESSIVE DISORDER WITHOUT PRIOR EPISODE: ICD-10-CM

## 2022-10-13 DIAGNOSIS — N18.30 CONTROLLED TYPE 2 DIABETES MELLITUS WITH STAGE 3 CHRONIC KIDNEY DISEASE, WITHOUT LONG-TERM CURRENT USE OF INSULIN: Primary | ICD-10-CM

## 2022-10-13 PROCEDURE — 99349 HOME/RES VST EST MOD MDM 40: CPT | Mod: S$GLB,,, | Performed by: NURSE PRACTITIONER

## 2022-10-13 PROCEDURE — 99349 PR HOME VISIT,ESTAB PATIENT,LEVEL III: ICD-10-PCS | Mod: S$GLB,,, | Performed by: NURSE PRACTITIONER

## 2022-10-17 NOTE — PROGRESS NOTES
[facility]  Palliative Home Visit    Visit Date: 10/13/2022  Encounter Provider: Hoda Bae NP  PCP:  Jia Borjas    Subjective:      Patient ID: Audrey Lama is a 89 y.o. female.    Consult Requested By:    Reason for Consult:  Palliative care home visit    Audrey is being seen at home due to  physical debility that presents a taxing effort to leave the home, to mitigate high risk of hospital readmission or due to the limited availability of reliable or safe options for transportation to the point of access to the provider. Prior to treatment on this visit the chart was reviewed and patient consent was obtained.        Chief Complaint: Follow-up    Follow-up  Associated symptoms include weakness. Pertinent negatives include no abdominal pain, arthralgias, chest pain, congestion, coughing, fatigue, nausea, rash or vomiting.      Audrey Lama is a 89 year old female with a past medical history of Diabetes mellitus, Chronic kidney disease, Hypertension, Hyperlipidemia, Major Depression Disorder, and a History of Ovarian cancer.     With this visit today patient is found outside sitting on the porch at [facility] where she resides. She appears to be doing well. Patient is AAOx3, able to verify her name and . Most of patients other history was received from her daughter over the phone at previous visit and her medical record. She currently sees  Dr. Borjas as her PCP but has not had an appointment since the COVID pandemic, patient also expresses she does not want to leave the facility for an appointment. I will refer patient to [facility] to establish PCP. I will continue seeing the patient in the home as it is difficult for him to physically make visits. She endorses eating x 3 meals per day cooked by the facilty. She endorses regular BMs . I spoke with facility staff who has some concerns about her medication refills, all meds refilled appropriately. She has a  Squamous cell carcinoma of the right hand and follow Dr Loaiza.       VSS. Denies fever, chest pain, shortness of breath, nausea, vomiting, diarrhea. Risks of environmental exposure to coronavirus discussed including: social distancing, hand hygiene, and limiting departures from the home for necessities only.  Reports understanding and willingness to comply.  All hospital discharge orders reviewed and being followed, all medications reconciled and reviewed, patient and family verbalized understanding. No other needs identified at this time.     Attestation: Screening criteria to assess the level of the patient's risk for infection with COVID-19 as recommended by the CDC at the time of the above documented home visit concluded appropriateness to proceed. Universal precautions were maintained at all times, including provider use of 60% alcohol gel hand  immediately prior to entry and upon departing the patient's home      Review of Systems   Constitutional: Negative for activity change, fatigue and unexpected weight change.   HENT: Negative for congestion, dental problem and trouble swallowing.  Hard of hearing  Eyes: Positive for visual disturbance. Negative for redness.   Respiratory: Negative for cough, shortness of breath and wheezing.    Cardiovascular: Negative for chest pain and palpitations.   Gastrointestinal: Negative for abdominal distention, abdominal pain, nausea and vomiting.   Endocrine: Negative for polydipsia and polyuria.   Genitourinary: Negative for difficulty urinating and dysuria.   Musculoskeletal: Positive for gait problem. Negative for arthralgias.   Skin: Negative for color change and rash.        Nodule on right hand pointer finger       Allergic/Immunologic: Negative for food allergies.   Neurological: Positive for weakness. Negative for dizziness and tremors.   Psychiatric/Behavioral: Negative for agitation.       Assessments:  Environmental: Lives in [place]  Functional Status: Min  asst with ADLs and mobility  Safety: Fall precautions  Nutritional: Cardiac diet  Home Health/DME/Supplies: rollator    Objective:   Physical Exam  HENT:      Head: Normocephalic.      Right Ear: Tympanic membrane normal.      Left Ear: Tympanic membrane normal.      Nose: Nose normal.      Mouth/Throat:      Mouth: Mucous membranes are moist.   Eyes:      Pupils: Pupils are equal, round, and reactive to light.   Cardiovascular:      Rate and Rhythm: Normal rate.      Pulses: Normal pulses.   Pulmonary:      Effort: Pulmonary effort is normal.      Breath sounds: Normal breath sounds.   Abdominal:      General: Abdomen is flat.        Palpations: Abdomen is soft.   Musculoskeletal:         General: Normal range of motion.      Cervical back: Normal range of motion.   Skin:     General: Skin is warm.      Capillary Refill: Capillary refill takes less than 2 seconds.     Neurological:      General: No focal deficit present.      Mental Status: She is alert.   Psychiatric:         Mood and Affect: Mood normal.         Vitals:    10/13/22 1100   BP: 130/74   Pulse: 73   Resp: 18   Temp: 97.8 °F (36.6 °C)   SpO2: 97%     There is no height or weight on file to calculate BMI.    Assessment:   Audrey was seen today for establish care.    Diagnoses and all orders for this visit:    Debility  -     Ambulatory referral/consult to [facility]    Controlled type 2 diabetes mellitus with stage 2 chronic kidney disease, without long-term current use of insulin  -     Ambulatory referral/consult to [facility]    Primary hypertension  -     Ambulatory referral/consult to [facility]    Skin Lesion  Squamous cell Carcinoma  healed    Plan:     Ethical / Legal: Advance Care Planning   Surrogate decision maker:  Name Hugo Veliz, Relationship: Daughter  Code Status: Full code  [place]:  Discussed with daughter  Other advance directive:   amena Ventura was seen today for establish care.    Diagnoses and all orders for this visit:  Debility  -     Ambulatory referral[facility]cons[facility]    Controlled type 2 diabetes mellitus with stage 2 chronic kidney disease, without long-term current use of insulin  -     Ambulatory referral/consult to [facility]    Primary hypertension  -     Ambulatory referral/consult to [facility]    Skin Lesion  healed       Were controlled substances prescribed?  No    Follow Up Appointments:   No future appointments.      Signature:  Hoda Bae, NP    Patient consent obtained prior to treatment at this visit.

## 2022-10-19 VITALS
TEMPERATURE: 98 F | HEART RATE: 73 BPM | OXYGEN SATURATION: 97 % | SYSTOLIC BLOOD PRESSURE: 130 MMHG | RESPIRATION RATE: 18 BRPM | DIASTOLIC BLOOD PRESSURE: 74 MMHG

## 2022-10-20 NOTE — PATIENT INSTRUCTIONS
Instructions:  - [facility] Nurse Practitioner to schedule home follow-up visit with patient in 6-8 weeks or as needed.  - Continue all medications, treatments and therapies as ordered.   - Follow all instructions, recommendations as discussed.  - Maintain Safety Precautions at all times.  - Attend all medical appointments as scheduled.  - For worsening symptoms: call Primary Care Physician or Nurse Practitioner.  - For emergencies, call 911 or immediately report to the nearest emergency room.  - Limit Risks of environmental exposure to coronavirus/COVID-19 as discussed including: social distancing, hand hygiene, and limiting departures from the home for necessities only.

## 2022-10-23 ENCOUNTER — PATIENT MESSAGE (OUTPATIENT)
Dept: INTERNAL MEDICINE | Facility: CLINIC | Age: 87
End: 2022-10-23
Payer: MEDICARE

## 2022-10-23 ENCOUNTER — PATIENT MESSAGE (OUTPATIENT)
Dept: HOME HEALTH SERVICES | Facility: CLINIC | Age: 87
End: 2022-10-23
Payer: MEDICARE

## 2022-10-25 ENCOUNTER — PATIENT MESSAGE (OUTPATIENT)
Dept: HOME HEALTH SERVICES | Facility: CLINIC | Age: 87
End: 2022-10-25
Payer: MEDICARE

## 2022-10-26 ENCOUNTER — PATIENT MESSAGE (OUTPATIENT)
Dept: HOME HEALTH SERVICES | Facility: CLINIC | Age: 87
End: 2022-10-26
Payer: MEDICARE

## 2022-10-26 NOTE — TELEPHONE ENCOUNTER
Are you able to see this pt in [place]?  If not, could you ask [facility] to send someone who can?    Thank you.

## 2022-11-03 ENCOUNTER — PATIENT MESSAGE (OUTPATIENT)
Dept: HOME HEALTH SERVICES | Facility: CLINIC | Age: 87
End: 2022-11-03
Payer: MEDICARE

## 2022-11-03 ENCOUNTER — PATIENT MESSAGE (OUTPATIENT)
Dept: INTERNAL MEDICINE | Facility: CLINIC | Age: 87
End: 2022-11-03
Payer: MEDICARE

## 2022-11-09 ENCOUNTER — OFFICE VISIT (OUTPATIENT)
Dept: INTERNAL MEDICINE | Facility: CLINIC | Age: 87
End: 2022-11-09
Payer: MEDICARE

## 2022-11-09 ENCOUNTER — HOSPITAL ENCOUNTER (OUTPATIENT)
Dept: RADIOLOGY | Facility: HOSPITAL | Age: 87
Discharge: HOME | End: 2022-11-09
Attending: PHYSICIAN ASSISTANT
Payer: MEDICARE

## 2022-11-09 VITALS
WEIGHT: 131.81 LBS | HEART RATE: 66 BPM | DIASTOLIC BLOOD PRESSURE: 54 MMHG | HEIGHT: 64 IN | TEMPERATURE: 98 F | BODY MASS INDEX: 22.5 KG/M2 | SYSTOLIC BLOOD PRESSURE: 124 MMHG | OXYGEN SATURATION: 97 %

## 2022-11-09 DIAGNOSIS — F32.0 CURRENT MILD EPISODE OF MAJOR DEPRESSIVE DISORDER WITHOUT PRIOR EPISODE: ICD-10-CM

## 2022-11-09 DIAGNOSIS — E11.22 CONTROLLED TYPE 2 DIABETES MELLITUS WITH STAGE 3 CHRONIC KIDNEY DISEASE, WITHOUT LONG-TERM CURRENT USE OF INSULIN: ICD-10-CM

## 2022-11-09 DIAGNOSIS — N18.30 CONTROLLED TYPE 2 DIABETES MELLITUS WITH STAGE 3 CHRONIC KIDNEY DISEASE, WITHOUT LONG-TERM CURRENT USE OF INSULIN: ICD-10-CM

## 2022-11-09 DIAGNOSIS — R05.9 COUGH, UNSPECIFIED TYPE: ICD-10-CM

## 2022-11-09 DIAGNOSIS — I10 HYPERTENSION, UNSPECIFIED TYPE: ICD-10-CM

## 2022-11-09 DIAGNOSIS — J06.9 UPPER RESPIRATORY TRACT INFECTION, UNSPECIFIED TYPE: Primary | ICD-10-CM

## 2022-11-09 DIAGNOSIS — E78.5 HYPERLIPIDEMIA, UNSPECIFIED HYPERLIPIDEMIA TYPE: ICD-10-CM

## 2022-11-09 PROCEDURE — 99214 OFFICE O/P EST MOD 30 MIN: CPT | Mod: S$PBB,,, | Performed by: PHYSICIAN ASSISTANT

## 2022-11-09 PROCEDURE — 71046 X-RAY EXAM CHEST 2 VIEWS: CPT | Mod: TC

## 2022-11-09 PROCEDURE — 99999 PR PBB SHADOW E&M-EST. PATIENT-LVL V: ICD-10-PCS | Mod: PBBFAC,,, | Performed by: PHYSICIAN ASSISTANT

## 2022-11-09 PROCEDURE — 99999 PR PBB SHADOW E&M-EST. PATIENT-LVL V: CPT | Mod: PBBFAC,,, | Performed by: PHYSICIAN ASSISTANT

## 2022-11-09 PROCEDURE — 71046 XR CHEST PA AND LATERAL: ICD-10-PCS | Mod: 26,,, | Performed by: RADIOLOGY

## 2022-11-09 PROCEDURE — 99215 OFFICE O/P EST HI 40 MIN: CPT | Mod: PBBFAC,25 | Performed by: PHYSICIAN ASSISTANT

## 2022-11-09 PROCEDURE — 99214 PR OFFICE/OUTPT VISIT, EST, LEVL IV, 30-39 MIN: ICD-10-PCS | Mod: S$PBB,,, | Performed by: PHYSICIAN ASSISTANT

## 2022-11-09 PROCEDURE — 71046 X-RAY EXAM CHEST 2 VIEWS: CPT | Mod: 26,,, | Performed by: RADIOLOGY

## 2022-11-09 NOTE — PROGRESS NOTES
Subjective:       Patient ID: Audrey Lama is a 89 y.o. female.        Chief Complaint: Nasal Congestion    Audrey Lama is an established patient of Jia Borjas MD here today for urgent care visit.    Here today with her daughter who gives history  Moved in with her daughter a few weeks ago, no longer at [place]  Metformin d/c due to normalized blood sugar    Cough started 11/3, temp was 99.4, gave tylenol and temp came down to 97.7, also gave children's mucinex  11/4 her daughter notes she had a lot of mucus production but was eating well, home covid test negative, temp up to 99 again, given children's mucinex again  11/5 started using vicks, seemed congested though  11/6 still with ongoing chest congestion     No elevated temp for past 3 days    Still with cough and chest congestion  No shortness of breath    HTN - tx with amlodipine 10 mg, losartan 100 mg, toprol XL 50 mg  BP Readings from Last 5 Encounters:  11/09/22 : (!) 124/54  10/13/22 : 130/74  09/06/22 : 133/62  08/31/22 : (!) 117/58  06/08/22 : 124/70     Depression - tx with zoloft 25 mg daily         Review of Systems   Constitutional:  Negative for chills, diaphoresis, fatigue and fever.   HENT:  Negative for congestion and sore throat.    Eyes:  Negative for visual disturbance.   Respiratory:  Positive for cough. Negative for chest tightness and shortness of breath.    Cardiovascular:  Negative for chest pain, palpitations and leg swelling.   Gastrointestinal:  Negative for abdominal pain, blood in stool, constipation, diarrhea, nausea and vomiting.   Genitourinary:  Negative for dysuria, frequency, hematuria and urgency.   Musculoskeletal:  Negative for arthralgias and back pain.   Skin:  Negative for rash.   Neurological:  Negative for dizziness, syncope, weakness and headaches.   Psychiatric/Behavioral:  Negative for dysphoric mood and sleep disturbance. The patient is not nervous/anxious.      Objective:      Physical Exam  Vitals  and nursing note reviewed.   Constitutional:       Appearance: Normal appearance. She is well-developed.   HENT:      Head: Normocephalic.      Right Ear: Tympanic membrane and external ear normal.      Left Ear: Tympanic membrane and external ear normal.      Nose: No mucosal edema or rhinorrhea.      Mouth/Throat:      Pharynx: Oropharynx is clear.   Eyes:      Pupils: Pupils are equal, round, and reactive to light.   Cardiovascular:      Rate and Rhythm: Normal rate and regular rhythm.      Heart sounds: Normal heart sounds. No murmur heard.    No friction rub. No gallop.   Pulmonary:      Effort: Pulmonary effort is normal. No respiratory distress.      Breath sounds: Normal breath sounds.   Abdominal:      Palpations: Abdomen is soft.      Tenderness: There is no abdominal tenderness.   Skin:     General: Skin is warm and dry.   Neurological:      Mental Status: She is alert.       Assessment:       1. Upper respiratory tract infection, unspecified type    2. Current mild episode of major depressive disorder without prior episode    3. Hypertension, unspecified type    4. Hyperlipidemia, unspecified hyperlipidemia type    5. Controlled type 2 diabetes mellitus with stage 3 chronic kidney disease, without long-term current use of insulin        Plan:       Audrey was seen today for nasal congestion.    Diagnoses and all orders for this visit:    Upper respiratory tract infection, unspecified type  -     X-Ray Chest PA And Lateral; Future    Current mild episode of major depressive disorder without prior episode - continue zoloft    Hypertension, unspecified type - stable and controlled  BP Readings from Last 5 Encounters:   11/09/22 (!) 124/54   10/13/22 130/74   09/06/22 133/62   08/31/22 (!) 117/58   06/08/22 124/70      Hyperlipidemia, unspecified hyperlipidemia type    Controlled type 2 diabetes mellitus with stage 3 chronic kidney disease, without long-term current use of insulin - off metformin    She  "declines lab work today  Finally agrees to CXR  Living with her daughter, Zoya, previously lived at [facility]  Daughter's contact number is     Pt has been given instructions populated from [employer] database and has verbalized understanding of the after visit summary and information contained wherein.    Follow up with a primary care provider. May go to ER for acute shortness of breath, lightheadedness, fever, or any other emergent complaints or changes in condition.    "This note will be shared with the patient"    Future Appointments   Date Time Provider Department Center   12/8/2022  8:00 AM Hoda Bae, NP [facility] [place]                   "

## 2022-11-11 ENCOUNTER — PATIENT MESSAGE (OUTPATIENT)
Dept: HOME HEALTH SERVICES | Facility: CLINIC | Age: 87
End: 2022-11-11
Payer: MEDICARE

## 2022-12-01 ENCOUNTER — PATIENT MESSAGE (OUTPATIENT)
Dept: HOME HEALTH SERVICES | Facility: CLINIC | Age: 87
End: 2022-12-01
Payer: MEDICARE

## 2022-12-01 ENCOUNTER — PATIENT MESSAGE (OUTPATIENT)
Dept: DERMATOLOGY | Facility: CLINIC | Age: 87
End: 2022-12-01
Payer: MEDICARE

## 2022-12-09 ENCOUNTER — PATIENT MESSAGE (OUTPATIENT)
Dept: INTERNAL MEDICINE | Facility: CLINIC | Age: 87
End: 2022-12-09
Payer: MEDICARE

## 2022-12-12 ENCOUNTER — CARE AT HOME (OUTPATIENT)
Dept: HOME HEALTH SERVICES | Facility: CLINIC | Age: 87
End: 2022-12-12
Payer: MEDICARE

## 2022-12-12 VITALS
OXYGEN SATURATION: 98 % | SYSTOLIC BLOOD PRESSURE: 138 MMHG | HEART RATE: 77 BPM | DIASTOLIC BLOOD PRESSURE: 78 MMHG | RESPIRATION RATE: 16 BRPM

## 2022-12-12 DIAGNOSIS — F33.0 MAJOR DEPRESSIVE DISORDER, RECURRENT, MILD: Primary | ICD-10-CM

## 2022-12-12 DIAGNOSIS — Z78.9 IMPAIRED MOBILITY AND ACTIVITIES OF DAILY LIVING: ICD-10-CM

## 2022-12-12 DIAGNOSIS — C44.622 SQUAMOUS CELL CANCER OF SKIN OF FINGER, RIGHT: ICD-10-CM

## 2022-12-12 DIAGNOSIS — E11.22 CONTROLLED TYPE 2 DIABETES MELLITUS WITH STAGE 3 CHRONIC KIDNEY DISEASE, WITHOUT LONG-TERM CURRENT USE OF INSULIN: ICD-10-CM

## 2022-12-12 DIAGNOSIS — N18.30 CONTROLLED TYPE 2 DIABETES MELLITUS WITH STAGE 3 CHRONIC KIDNEY DISEASE, WITHOUT LONG-TERM CURRENT USE OF INSULIN: ICD-10-CM

## 2022-12-12 DIAGNOSIS — I10 PRIMARY HYPERTENSION: ICD-10-CM

## 2022-12-12 DIAGNOSIS — K21.9 GASTROESOPHAGEAL REFLUX DISEASE, UNSPECIFIED WHETHER ESOPHAGITIS PRESENT: ICD-10-CM

## 2022-12-12 DIAGNOSIS — Z74.09 IMPAIRED MOBILITY AND ACTIVITIES OF DAILY LIVING: ICD-10-CM

## 2022-12-12 PROCEDURE — 99350 PR HOME VISIT,ESTAB PATIENT,LEVEL IV: ICD-10-PCS | Mod: S$GLB,,, | Performed by: NURSE PRACTITIONER

## 2022-12-12 PROCEDURE — 99350 HOME/RES VST EST HIGH MDM 60: CPT | Mod: S$GLB,,, | Performed by: NURSE PRACTITIONER

## 2022-12-12 RX ORDER — PANTOPRAZOLE SODIUM 40 MG/1
40 TABLET, DELAYED RELEASE ORAL DAILY
Qty: 30 TABLET | Refills: 11 | Status: SHIPPED | OUTPATIENT
Start: 2022-12-12 | End: 2023-07-11

## 2022-12-12 RX ORDER — MIRTAZAPINE 15 MG/1
15 TABLET, FILM COATED ORAL NIGHTLY
Qty: 30 TABLET | Refills: 12 | Status: SHIPPED | OUTPATIENT
Start: 2022-12-12 | End: 2024-01-15 | Stop reason: SDUPTHER

## 2022-12-12 RX ORDER — AMLODIPINE BESYLATE 10 MG/1
10 TABLET ORAL DAILY
Qty: 30 TABLET | Refills: 5 | Status: SHIPPED | OUTPATIENT
Start: 2022-12-12 | End: 2023-06-27

## 2022-12-12 RX ORDER — SERTRALINE HYDROCHLORIDE 25 MG/1
25 TABLET, FILM COATED ORAL DAILY
Qty: 30 TABLET | Refills: 11 | Status: SHIPPED | OUTPATIENT
Start: 2022-12-12 | End: 2023-03-04 | Stop reason: SDUPTHER

## 2022-12-12 NOTE — PROGRESS NOTES
[facility]  Medical Home Visit    Visit Date: 12/14/2022  Encounter Provider: Sandra Palmer  PCP:  Sandra Palmer NP    Subjective:      Patient ID: Audrey Lama is a 89 y.o. female.    Consult Requested By:  No ref. provider found  Reason for Consult:  Establish Care    Audrey is being seen at home due to being seen at home due to physical debility that presents a taxing effort to leave the home, to mitigate high risk of hospital readmission and/or due to the limited availability of reliable or safe options for transportation to the point of access to the provider. Prior to treatment on this visit the chart was reviewed and patient verbal consent was obtained.    Chief Complaint: Establish Care, Hypertension, Depression, and Wound Check      Pt is being seen today in her daughter's home to reestablish care with [facility].  She had previously resided at [facility] and was seen by Hoda Bae. [place] and now lives with her daughter in [place]. She was seeing a provider at [facility] after moving with her daughter but does not want to leave the home and now wants to reestablish with [facility].  She is new to this provider as she has moved into  territory.    She is found AAOx3, ambulating with a walker. She denies any complaints today and reports she is in her usual state of health. She is compliant with her medications, but reports she does not want to take all of them. Reports she is 89 y/o and does not need all those meds. She was taken off her DM meds recently and using diet control. She ambulates with a rollator. One recent fall as she transferred to her [facility] during the night. No injuries from that fall.    Reviewed her medications as she would like to reduce her med burden. High normal readings of b/p today, discussed her need to continue HTN meds and beta blocker for her heart. She stated understanding. She would like to discontinue her statin. She understands the  risk vs benefit profile.    She has a cancer to her right index finger. Derm had excised a large portion, but surgery was needed to remove the remaining cancer and pt declined the surgery. Daughter reports the cancer is growing back-see photo. Ms Lama does not want any surgery or intervention. She reports it is fine. Daughter does give her OTC pain meds for the finger at times.     Discussion held with pt and/or family related to advanced care planning.  Discussed end of life goals and pt's wishes regarding end of care.  Encouraged designation of a HPOA and discussing wishes with them  Reviewed living will, [place] Post, and pt's wishes regarding life support and tube feeding.  Reviewed pt's current code status and prognosis.   Copy of [facility] advanced care planning packet and LAPOST given to family for review and discussion. Pt reports a DNR status, will complete LAPost  All questions answered and paperwork completed or review at next appt  30 minutes spent in discussion of these services.           Review of Systems   Constitutional:  Negative for activity change, fatigue and fever.   HENT:  Positive for hearing loss.    Respiratory:  Negative for chest tightness.    Cardiovascular: Negative.  Negative for leg swelling.   Gastrointestinal: Negative.         Frequent belching   Endocrine: Negative.    Genitourinary: Negative.    Musculoskeletal: Negative.    Skin:  Positive for wound (right index finger, see photo).   Allergic/Immunologic: Negative.    Neurological: Negative.    Hematological: Negative.    Psychiatric/Behavioral: Negative.  Negative for agitation.    All other systems reviewed and are negative.      Assessments:  Environmental: lives with daughter, single story home  Functional Status: requires some assistance  Safety: fall risk  Nutritional: adequate  Home Health/DME/Supplies: walker    Objective:   Physical Exam  Vitals reviewed.   Constitutional:       General: She is not in acute distress.      Appearance: She is well-developed.   HENT:      Head: Normocephalic and atraumatic.      Nose: Nose normal.      Mouth/Throat:      Mouth: Mucous membranes are dry.   Eyes:      Pupils: Pupils are equal, round, and reactive to light.   Cardiovascular:      Rate and Rhythm: Normal rate and regular rhythm.      Heart sounds: Normal heart sounds.   Pulmonary:      Effort: Pulmonary effort is normal.      Breath sounds: Normal breath sounds.   Abdominal:      General: Bowel sounds are normal.      Palpations: Abdomen is soft.   Musculoskeletal:         General: Normal range of motion.      Cervical back: Normal range of motion and neck supple.   Skin:     General: Skin is warm and dry.   Neurological:      Mental Status: She is alert and oriented to person, place, and time.      Cranial Nerves: No cranial nerve deficit.   Psychiatric:         Behavior: Behavior normal.         Thought Content: Thought content normal.         Judgment: Judgment normal.         Vitals:    12/12/22 1300   BP: 138/78   Pulse: 77   Resp: 16   SpO2: 98%   PainSc: 0-No pain     There is no height or weight on file to calculate BMI.    Assessment:     1. Major depressive disorder, recurrent, mild    2. Primary hypertension    3. Gastroesophageal reflux disease, unspecified whether esophagitis present    4. Impaired mobility and activities of daily living    5. Controlled type 2 diabetes mellitus with stage 3 chronic kidney disease, without long-term current use of insulin    6. 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, right        Plan:     Ethical / Legal: Advance Care Planning   Surrogate decision maker:  Yordan davila, Relationship: daughter  Code Status:  DNR  LaPOST:  Not on file  Capacity to make medical decisions:  Intact, Conflict: None       Problem List Items Addressed This Visit          Psychiatric    Major depressive disorder, recurrent, mild - Primary    Current Assessment & Plan     Pt with some anxiety  No thoughts of  harm  Sertraline and Remeron in place  Continue current plan         Relevant Medications    sertraline (ZOLOFT) 25 MG tablet    mirtazapine (REMERON) 15 MG tablet       Cardiac/Vascular    Hypertension    Current Assessment & Plan     Chronic, normotensive today  Continue Norvasc and Losartan  Pt would like to reduce med burden  Instructed with current readings, med as necessary  Home health ordered to assist with monitoring         Relevant Medications    amLODIPine (NORVASC) 10 MG tablet    Other Relevant Orders    Ambulatory referral/consult to Home Health       Endocrine    Controlled type 2 diabetes mellitus, without long-term current use of insulin    Current Assessment & Plan     Pt currently diet controlled  Previously on glucophage  Reviewed ADA diet  Monitor            GI    Gastroesophageal reflux disease    Current Assessment & Plan     Belching and discomfort after eating.  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  No  Total visit time: 90 min  Follow Up Appointments:   Future Appointments   Date Time Provider Department Center   1/18/2023  8:00 AM Sandra Palmer NP [facility] [place]       Signature: Sandra Palmer NP

## 2022-12-12 NOTE — ASSESSMENT & PLAN NOTE
Chronic, normotensive today  Continue Norvasc and Losartan  Pt would like to reduce med burden  Instructed with current readings, med as necessary  Home health ordered to assist with monitoring

## 2022-12-12 NOTE — ASSESSMENT & PLAN NOTE
Belching and discomfort after eating.  Some discomfort needs to sit up after eating  Start protonix  Sit up for 30 min after eating  Monitor

## 2022-12-13 ENCOUNTER — PATIENT MESSAGE (OUTPATIENT)
Dept: INTERNAL MEDICINE | Facility: CLINIC | Age: 87
End: 2022-12-13
Payer: MEDICARE

## 2022-12-14 ENCOUNTER — E-CONSULT (OUTPATIENT)
Dept: DERMATOLOGY | Facility: CLINIC | Age: 87
End: 2022-12-14
Payer: MEDICARE

## 2022-12-14 DIAGNOSIS — D48.5 NEOPLASM OF UNCERTAIN BEHAVIOR OF SKIN: Primary | ICD-10-CM

## 2022-12-14 PROBLEM — C44.622: Status: ACTIVE | Noted: 2022-12-14

## 2022-12-14 PROCEDURE — 99451 PR INTERPROF, PHONE/INTERNET/EHR, CONSULT, >= 5MINS: ICD-10-PCS | Mod: S$PBB,,, | Performed by: STUDENT IN AN ORGANIZED HEALTH CARE EDUCATION/TRAINING PROGRAM

## 2022-12-14 PROCEDURE — 99451 NTRPROF PH1/NTRNET/EHR 5/>: CPT | Mod: S$PBB,,, | Performed by: STUDENT IN AN ORGANIZED HEALTH CARE EDUCATION/TRAINING PROGRAM

## 2022-12-14 NOTE — ASSESSMENT & PLAN NOTE
Excised by derm   Pt declined further surgery  Growth returning to finger  Message to derm to assist with recs  Pt does not want procedures

## 2022-12-14 NOTE — PROGRESS NOTES
[facility] - Dermatology 4th Floor  Response for E-Consult     Patient Name: Audrey Lama  MRN: [id number]  Primary Care Provider: Sandra Palmer NP   Requesting Provider: Sandra Palmer NP      Findings: Neoplasm of Uncertain Skin Behavior    I did not speak to the requesting provider verbally about this.     Total time of Consultation: 5 minute    Percentage of time spent on verbal/written discussion: 75%    Yovanny Flores,    At this time, there is not much that can be done. The lesion itself needs to be biopsied to prove that it is a recurrence of skin cancer so treatment can be tailored to that specific diagnosis. I believe her daughter has been in communication with Dr. Loaiza who is our Mohs surgeon in [place] and was given similar advice. If she is in pain, I would recommend ice compressions to help and NSAIDs if appropriate but there's not we can do without having a proven diagnosis.    Please let me know if you have any questions,  Aaliyah Ordaz MD       *This eConsult is based on the clinical data available to me and is furnished without benefit of a physical examination. The eConsult will need to be interpreted in light of any clinical issues or changes in patient status not available to me at the time of filing this eConsults. Significant changes in patient condition or level of acuity should result in immediate formal consultation and reevaluation. Please alert me if you have further questions.    Thank you for your consult.     Aaliyah Ordaz MD  [facility] Dermatology 4th Floor

## 2022-12-27 ENCOUNTER — PATIENT MESSAGE (OUTPATIENT)
Dept: ORTHOPEDICS | Facility: CLINIC | Age: 87
End: 2022-12-27
Payer: MEDICARE

## 2023-01-04 ENCOUNTER — TELEPHONE (OUTPATIENT)
Dept: ORTHOPEDICS | Facility: CLINIC | Age: 88
End: 2023-01-04
Payer: MEDICARE

## 2023-01-04 NOTE — TELEPHONE ENCOUNTER
Spoke c pt. daughter Confirmed 11:15 appt c Dr. Blackwood. Pt expressed understanding & was thankful.

## 2023-01-10 ENCOUNTER — OFFICE VISIT (OUTPATIENT)
Dept: ORTHOPEDICS | Facility: CLINIC | Age: 88
End: 2023-01-10
Payer: MEDICARE

## 2023-01-10 VITALS — BODY MASS INDEX: 22.36 KG/M2 | WEIGHT: 131 LBS | HEIGHT: 64 IN

## 2023-01-10 DIAGNOSIS — C44.622 SQUAMOUS CELL CANCER OF SKIN OF FINGER, RIGHT: ICD-10-CM

## 2023-01-10 DIAGNOSIS — L98.9 FINGER LESION: Primary | ICD-10-CM

## 2023-01-10 PROCEDURE — 99214 PR OFFICE/OUTPT VISIT, EST, LEVL IV, 30-39 MIN: ICD-10-PCS | Mod: S$PBB,,, | Performed by: ORTHOPAEDIC SURGERY

## 2023-01-10 PROCEDURE — 99214 OFFICE O/P EST MOD 30 MIN: CPT | Mod: S$PBB,,, | Performed by: ORTHOPAEDIC SURGERY

## 2023-01-10 PROCEDURE — 99213 OFFICE O/P EST LOW 20 MIN: CPT | Mod: PBBFAC | Performed by: ORTHOPAEDIC SURGERY

## 2023-01-10 PROCEDURE — 99999 PR PBB SHADOW E&M-EST. PATIENT-LVL III: CPT | Mod: PBBFAC,,, | Performed by: ORTHOPAEDIC SURGERY

## 2023-01-10 PROCEDURE — 99999 PR PBB SHADOW E&M-EST. PATIENT-LVL III: ICD-10-PCS | Mod: PBBFAC,,, | Performed by: ORTHOPAEDIC SURGERY

## 2023-01-10 NOTE — PROGRESS NOTES
[facility]  History & Physical  Orthopedics    SUBJECTIVE:     Chief Complaint: right index finger lesion    Referring Provider: No ref. provider found     History of Present Illness:  Patient is a 89 y.o. right hand dominant female who presents today with complaints of right index finger lesion. Patient developed a mass to right index finger 2 years ago which initially was slow growing however over the past several months increased in size fairly rapidly.  She saw her dermatologist Dr. Loaiza who did a biopsy with debulking and sent for pathology which is still pending.  She is concern for squamous cell carcinoma and was not able to resect the entire mass in its entirety.  She does refer the patient to us for further resection and wound coverage.  The patient presents today with her daughter.  She is adamant that she is not interested in any further surgery.  She is not having any pain, numbness or tingling.  She has been doing wound care with warm soapy water and covering the wound with a Band-Aid.    Onset of symptoms/DOI was 2 years ago    The patient denies any fevers, chills, N/V, D/C and presents for evaluation.    1/10/23  Pt presents for follow up right index squamous cell carcinoma. She was previously referred by Dr. Goldsmith who biopsied the site and was concerned for skin cancer without clear margins she was referred to us for further resection. At our last visit pt adamantly declined surgery however today she notes increased size of the mass with associated pain/ discomfort at the site of the mass. She is interested in proceeding with surgical excision at this time.      Review of patient's allergies indicates:   Allergen Reactions    Celexa [citalopram]        Past Medical History:   Diagnosis Date    Diabetes mellitus     Hypertension     Macular degeneration     Squamous cell carcinoma      Past Surgical History:   Procedure Laterality Date    CATARACT EXTRACTION        SECTION      HERNIA REPAIR  2011    HYSTERECTOMY  1985     Family History   Problem Relation Age of Onset    Amblyopia Sister     Hypertension Sister     Cancer Son     Heart attack Son     Blindness Neg Hx     Cataracts Neg Hx     Diabetes Neg Hx     Glaucoma Neg Hx     Macular degeneration Neg Hx     Retinal detachment Neg Hx     Strabismus Neg Hx     Stroke Neg Hx     Thyroid disease Neg Hx      Social History     Tobacco Use    Smoking status: Never    Smokeless tobacco: Never   Substance Use Topics    Alcohol use: Yes     Comment: 1 drink per month    Drug use: Never        Review of Systems:  Constitutional: Denies fever/chills  Neurological: Denies numbness/tingling (any exceptions noted in orthopaedic exam)   Psychiatric/Behavioral: Denies change in normal mood  Eyes: Denies change in vision  Cardiovascular: Denies chest pain  Respiratory: Denies shortness of breath  Hematologic/Lymphatic: Denies easy bleeding/bruising   Skin: Denies new rash or skin lesions   Gastrointestinal: Denies nausea/vomitting/diarrhea, change in bowel habits, abdominal pain   Allergic/Immunologic: Denies adverse reactions to current medications  Musculoskeletal: see HPI      OBJECTIVE:     Vital Signs (Most Recent)       Physical Exam:  Gen:  No acute distress  CV:  Peripherally well-perfused.  Pulses 2+ bilaterally.  Lungs:  Normal respiratory effort.  Abdomen:  Soft, non-tender, non-distended  Head/Neck:  Normocephalic.  Atraumatic. No TTP, AROM and PROM intact without pain  Neuro:  CN intact without deficit, SILT throughout B/L Upper & Lower Extremities      Right index finger  There is an approximately 4 x 4 cm wound at the dorsal aspect over P1 of her index finger. She has local tenderness to palpation over the mass. The surrounding skin is now well healed with some scar tissue present. No erythema, drainage or signs of infection.  Patient is nontender to palpation.  She is able to flex at her PIP PIP and MP joint but overall  the finger is somewhat stiff and she lacks flexion compared to her other fingers.  She is able to fully extend the finger. SILT throughout. Finger tip wwp      Diagnostic Results:     Imaging - I independently viewed the patient's imaging as well as the radiology report.  Xrays of the patient's left index finger demonstrate no evidence of any acute fractures or dislocations.  She has degenerative changes most pronounced at her D IP joint.  There is no bony destruction at the proximal phalanx which is the side of her lesion.    EMG - none    Pathology  Skin, right index finger, biopsy:   -WELL-DIFFERENTIATED SQUAMOUS CELL CARCINOMA, EXTENDING TO THE PERIPHERAL AND   DEEP BIOPSY EDGES     ASSESSMENT/PLAN:     Audrey was seen today for pain and swelling.    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 yo female with left index finger lesion dorsal aspect of P1.  This was debulked and biopsied by Dr. Loaiza 8/31/22, pathology with squamous cell carcinoma extending to peripheral and deep edges. She reports growth of the mass since last visit as well as increased pain. Surgery previously discussed with pt at last visit she adamantly refused at the time however she does now wish to proceed.       Plan:  recommended surgery for further tumor resection and wound coverage. Pt wishes to proceed. Consents reviewed and signed in clinic all questions answered.   RTC PO

## 2023-01-12 NOTE — PROGRESS NOTES
I have personally taken the history and examined the patient. I agree with the Hand Surgery PA's note. The plan will be :        89 y.o. yo female with left index finger lesion dorsal aspect of P1.  This was debulked and biopsied by Dr. Loaiza 8/31/22, pathology with squamous cell carcinoma extending to peripheral and deep edges. She reports growth of the mass since last visit as well as increased pain. Surgery previously discussed with pt at last visit she adamantly refused at the time however she does now wish to proceed.         Plan:  recommended surgery for further tumor resection and wound coverage. Pt wishes to proceed. Consents reviewed and signed in clinic all questions answered.   RTC PO

## 2023-01-13 ENCOUNTER — PATIENT MESSAGE (OUTPATIENT)
Dept: DERMATOLOGY | Facility: CLINIC | Age: 88
End: 2023-01-13
Payer: MEDICARE

## 2023-01-19 ENCOUNTER — EXTERNAL HOME HEALTH (OUTPATIENT)
Dept: HOME HEALTH SERVICES | Facility: HOSPITAL | Age: 88
End: 2023-01-19
Payer: MEDICARE

## 2023-01-19 DIAGNOSIS — L98.9 FINGER LESION: ICD-10-CM

## 2023-01-19 DIAGNOSIS — C44.622 SQUAMOUS CELL CANCER OF SKIN OF FINGER, RIGHT: Primary | ICD-10-CM

## 2023-01-30 ENCOUNTER — HOSPITAL ENCOUNTER (OUTPATIENT)
Dept: PREADMISSION TESTING | Facility: OTHER | Age: 88
Discharge: HOME | End: 2023-01-30
Attending: ORTHOPAEDIC SURGERY
Payer: MEDICARE

## 2023-01-30 ENCOUNTER — ANESTHESIA EVENT (OUTPATIENT)
Dept: SURGERY | Facility: OTHER | Age: 88
End: 2023-01-30
Payer: MEDICARE

## 2023-01-30 VITALS
DIASTOLIC BLOOD PRESSURE: 65 MMHG | HEIGHT: 64 IN | BODY MASS INDEX: 22.36 KG/M2 | TEMPERATURE: 98 F | RESPIRATION RATE: 16 BRPM | SYSTOLIC BLOOD PRESSURE: 141 MMHG | WEIGHT: 131 LBS | OXYGEN SATURATION: 97 % | HEART RATE: 60 BPM

## 2023-01-30 DIAGNOSIS — Z01.818 PREOP TESTING: Primary | ICD-10-CM

## 2023-01-30 LAB
ANION GAP SERPL CALC-SCNC: 6 MMOL/L (ref 8–16)
BASOPHILS # BLD AUTO: 0.03 K/UL (ref 0–0.2)
BASOPHILS NFR BLD: 0.3 % (ref 0–1.9)
BUN SERPL-MCNC: 21 MG/DL (ref 10–30)
CALCIUM SERPL-MCNC: 9 MG/DL (ref 8.7–10.5)
CHLORIDE SERPL-SCNC: 111 MMOL/L (ref 95–110)
CO2 SERPL-SCNC: 23 MMOL/L (ref 23–29)
CREAT SERPL-MCNC: 0.9 MG/DL (ref 0.5–1.4)
DIFFERENTIAL METHOD: ABNORMAL
EOSINOPHIL # BLD AUTO: 0.1 K/UL (ref 0–0.5)
EOSINOPHIL NFR BLD: 0.7 % (ref 0–8)
ERYTHROCYTE [DISTWIDTH] IN BLOOD BY AUTOMATED COUNT: 15 % (ref 11.5–14.5)
EST. GFR  (NO RACE VARIABLE): 58 ML/MIN/1.73 M^2
GLUCOSE SERPL-MCNC: 99 MG/DL (ref 70–110)
HCT VFR BLD AUTO: 34.7 % (ref 37–48.5)
HGB BLD-MCNC: 11.3 G/DL (ref 12–16)
IMM GRANULOCYTES # BLD AUTO: 0.06 K/UL (ref 0–0.04)
IMM GRANULOCYTES NFR BLD AUTO: 0.5 % (ref 0–0.5)
LYMPHOCYTES # BLD AUTO: 2.1 K/UL (ref 1–4.8)
LYMPHOCYTES NFR BLD: 17.8 % (ref 18–48)
MCH RBC QN AUTO: 29.1 PG (ref 27–31)
MCHC RBC AUTO-ENTMCNC: 32.6 G/DL (ref 32–36)
MCV RBC AUTO: 89 FL (ref 82–98)
MONOCYTES # BLD AUTO: 0.9 K/UL (ref 0.3–1)
MONOCYTES NFR BLD: 7.9 % (ref 4–15)
NEUTROPHILS # BLD AUTO: 8.5 K/UL (ref 1.8–7.7)
NEUTROPHILS NFR BLD: 72.8 % (ref 38–73)
NRBC BLD-RTO: 0 /100 WBC
PLATELET # BLD AUTO: 184 K/UL (ref 150–450)
PMV BLD AUTO: 10.4 FL (ref 9.2–12.9)
POTASSIUM SERPL-SCNC: 4.4 MMOL/L (ref 3.5–5.1)
RBC # BLD AUTO: 3.88 M/UL (ref 4–5.4)
SODIUM SERPL-SCNC: 140 MMOL/L (ref 136–145)
WBC # BLD AUTO: 11.59 K/UL (ref 3.9–12.7)

## 2023-01-30 PROCEDURE — 93010 EKG 12-LEAD: ICD-10-PCS | Mod: ,,, | Performed by: INTERNAL MEDICINE

## 2023-01-30 PROCEDURE — 85025 COMPLETE CBC W/AUTO DIFF WBC: CPT | Performed by: ANESTHESIOLOGY

## 2023-01-30 PROCEDURE — 93005 ELECTROCARDIOGRAM TRACING: CPT

## 2023-01-30 PROCEDURE — 36415 COLL VENOUS BLD VENIPUNCTURE: CPT | Performed by: ANESTHESIOLOGY

## 2023-01-30 PROCEDURE — 80048 BASIC METABOLIC PNL TOTAL CA: CPT | Performed by: ANESTHESIOLOGY

## 2023-01-30 PROCEDURE — 93010 ELECTROCARDIOGRAM REPORT: CPT | Mod: ,,, | Performed by: INTERNAL MEDICINE

## 2023-01-30 RX ORDER — LIDOCAINE HYDROCHLORIDE 10 MG/ML
0.5 INJECTION, SOLUTION EPIDURAL; INFILTRATION; INTRACAUDAL; PERINEURAL ONCE
Status: CANCELLED | OUTPATIENT
Start: 2023-01-30 | End: 2023-01-30

## 2023-01-30 RX ORDER — SODIUM CHLORIDE, SODIUM LACTATE, POTASSIUM CHLORIDE, CALCIUM CHLORIDE 600; 310; 30; 20 MG/100ML; MG/100ML; MG/100ML; MG/100ML
INJECTION, SOLUTION INTRAVENOUS CONTINUOUS
Status: CANCELLED | OUTPATIENT
Start: 2023-01-30

## 2023-01-30 NOTE — ANESTHESIA PREPROCEDURE EVALUATION
01/30/2023  Audrey Lama is a 89 y.o., female.      Pre-op Assessment    I have reviewed the Patient Summary Reports.     I have reviewed the Nursing Notes. I have reviewed the NPO Status.   I have reviewed the Medications.     Review of Systems  Anesthesia Hx:  No problems with previous Anesthesia  Denies Family Hx of Anesthesia complications.   Denies Personal Hx of Anesthesia complications.   Social:  Non-Smoker    Hematology/Oncology:  Hematology Normal   Oncology Normal     EENT/Dental:   Very hard of hearing   Cardiovascular:   Exercise tolerance: poor Hypertension    Renal/:   Chronic Renal Disease, CKD    Hepatic/GI:   GERD, well controlled    Musculoskeletal:   Arthritis     Neurological:  Neurology Normal    Endocrine:   Diabetes    Dermatological:   Sq cell skin    Psych:   Psychiatric History          Physical Exam  General: Cooperative, Alert and Oriented    Airway:  Mallampati: II   Mouth Opening: Normal  Neck ROM: Normal ROM    Dental:  Periodontal disease        Anesthesia Plan  Type of Anesthesia, risks & benefits discussed:    Anesthesia Type: Regional  Intra-op Monitoring Plan: Standard ASA Monitors  Post Op Pain Control Plan: multimodal analgesia  Informed Consent: Informed consent signed with the Patient and all parties understand the risks and agree with anesthesia plan.  All questions answered.   ASA Score: 3  Anesthesia Plan Notes: Very hard of hearing,regional w min sedation    Ready For Surgery From Anesthesia Perspective.     .

## 2023-01-30 NOTE — DISCHARGE INSTRUCTIONS
Information to Prepare you for your Surgery    PRE-ADMIT TESTING -  [phone]    [address] [facility]          Your surgery has been scheduled at [facility]. We are pleased to have the opportunity to serve you. For Further Information please call [phone].    On the day of surgery please report to the Information Desk on the 1st floor.    CONTACT YOUR PHYSICIAN'S OFFICE THE DAY PRIOR TO YOUR SURGERY TO OBTAIN YOUR ARRIVAL TIME.     The evening before surgery do not eat anything after 9 p.m. ( this includes hard candy, chewing gum and mints).  You may only have GATORADE, POWERADE AND WATER  from 9 p.m. until you leave your home.   DO NOT DRINK ANY LIQUIDS ON THE WAY TO THE HOSPITAL.      Why does your anesthesiologist allow you to drink Gatorade/Powerade before surgery?  Gatorade/Powerade helps to increase your comfort before surgery and to decrease your nausea after surgery. The carbohydrates in Gatorade/Powerade help reduce your body's stress response to surgery.  If you are a diabetic-drink only water prior to surgery.      Current Visitor policy(12/27/2021) - Patients may have 2 visitors pre and post procedure. Only 2 visitors will be allowed in the Surgical building with the patient. No one under the age of 12 will be allowed into the facility.    SPECIAL MEDICATION INSTRUCTIONS: TAKE medications checked off by the Anesthesiologist on your Medication List.    Angiogram Patients: Take medications as instructed by your physician, including aspirin.     Surgery Patients:    If you take ASPIRIN - Your PHYSICIAN/SURGEON will need to inform you IF/OR when you need to stop taking aspirin prior to your surgery.     The week prior to surgery do not ot take any medications containing IBUPROFEN or NSAIDS ( Advil, Motrin, Goodys, BC, Aleve, Naproxen etc) If you are not sure if you should take a medicine please call your surgeon's office.  Ok to take  Tylenol    Do Not Wear any make-up (especially eye make-up) to surgery. Please remove any false eyelashes or eyelash extensions. If you arrive the day of surgery with makeup/eyelashes on you will be required to remove prior to surgery. (There is a risk of corneal abrasions if eye makeup/eyelash extensions are not removed)      Leave all valuables at home.   Do Not wear any jewelry or watches, including any metal in body piercings. Jewelry must be removed prior to coming to the hospital.  There is a possibility that rings that are unable to be removed may be cut off if they are on the surgical extremity.    Please remove all hair extensions, wigs, clips and any other metal accessories/ ornaments from your hair.  These items may pose a flammable/fire risk in Surgery and must be removed.    Do not shave your surgical area at least 5 days prior to your surgery. The surgical prep will be performed at the hospital according to Infection Control regulations.    Contact Lens must be removed before surgery. Either do not wear the contact lens or bring a case and solution for storage.  Please bring a container for eyeglasses or dentures as required.  Bring any paperwork your physician has provided, such as consent forms,  history and physicals, doctor's orders, etc.   Bring comfortable clothes that are loose fitting to wear upon discharge. Take into consideration the type of surgery being performed.  Maintain your diet as advised per your physician the day prior to surgery.      Adequate rest the night before surgery is advised.   Park in the Parking lot behind the hospital or in the [facility] across the street from the parking lot. Parking is complimentary.  If you will be discharged the same day as your procedure, please arrange for a responsible adult to drive you home or to accompany you if traveling by taxi.   YOU WILL NOT BE PERMITTED TO DRIVE OR TO LEAVE THE HOSPITAL ALONE AFTER SURGERY.   If you are  being discharged the same day, it is strongly recommended that you arrange for someone to remain with you for the first 24 hrs following your surgery.    The Surgeon will speak to your family/visitor after your surgery regarding the outcome of your surgery and post op care.  The Surgeon may speak to you after your surgery, but there is a possibility you may not remember the details.  Please check with your family members regarding the conversation with the Surgeon.    We strongly recommend whoever is bringing you home be present for discharge instructions.  This will ensure a thorough understanding for your post op home care.    ALL CHILDREN MUST ALWAYS BE ACCOMPANIED BY AN ADULT.    Visitors-Refer to current Visitor policy handouts.    Thank you for your cooperation.  The Staff of [facility].            Bathing Instructions with Hibiclens    Shower the evening before and morning of your procedure with Chlorhexidine (Hibiclens)  do not use Chlorhexidine on your face or genitals. Do not get in your eyes.  Wash your face with water and your regular face wash/soap  Use your regular shampoo  Apply Chlorhexidine (Hibiclens) directly on your skin or on a wet washcloth and wash gently. When showering: Move away from the shower stream when applying Chlorhexidine (Hibiclens) to avoid rinsing off too soon.  Rinse thoroughly with warm water  Do not dilute Chlorhexidine (Hibiclens)   Dry off as usual, do not use any deodorant, powder, body lotions, perfume, after shave or cologne.

## 2023-01-31 ENCOUNTER — TELEPHONE (OUTPATIENT)
Dept: ORTHOPEDICS | Facility: CLINIC | Age: 88
End: 2023-01-31
Payer: MEDICARE

## 2023-01-31 DIAGNOSIS — C44.622 SQUAMOUS CELL CANCER OF SKIN OF FINGER, RIGHT: Primary | ICD-10-CM

## 2023-01-31 DIAGNOSIS — R22.30 MASS OF FINGER: ICD-10-CM

## 2023-01-31 RX ORDER — ACETAMINOPHEN 500 MG
1000 TABLET ORAL EVERY 12 HOURS PRN
Qty: 56 TABLET | Refills: 0 | Status: SHIPPED | OUTPATIENT
Start: 2023-01-31

## 2023-01-31 RX ORDER — IBUPROFEN 600 MG/1
600 TABLET ORAL EVERY 8 HOURS PRN
Qty: 42 TABLET | Refills: 0 | Status: SHIPPED | OUTPATIENT
Start: 2023-01-31

## 2023-01-31 RX ORDER — TRAMADOL HYDROCHLORIDE 50 MG/1
50 TABLET ORAL EVERY 6 HOURS PRN
Qty: 20 TABLET | Refills: 0 | Status: SHIPPED | OUTPATIENT
Start: 2023-01-31

## 2023-01-31 NOTE — TELEPHONE ENCOUNTER
LVM to Inform pt of 6:00 a.m. arrival time for 02/01/23 surgery at the [facility]. Reminded pt of NPO status. Requested a call back to the [facility] at [phone] with any questions and to confirm.   My O message sent

## 2023-02-01 ENCOUNTER — ANESTHESIA (OUTPATIENT)
Dept: SURGERY | Facility: OTHER | Age: 88
End: 2023-02-01
Payer: MEDICARE

## 2023-02-01 ENCOUNTER — HOSPITAL ENCOUNTER (OUTPATIENT)
Facility: OTHER | Age: 88
Discharge: HOME | End: 2023-02-01
Attending: ORTHOPAEDIC SURGERY | Admitting: ORTHOPAEDIC SURGERY
Payer: MEDICARE

## 2023-02-01 VITALS
TEMPERATURE: 98 F | BODY MASS INDEX: 27.31 KG/M2 | WEIGHT: 160 LBS | OXYGEN SATURATION: 92 % | HEIGHT: 64 IN | HEART RATE: 62 BPM | SYSTOLIC BLOOD PRESSURE: 186 MMHG | RESPIRATION RATE: 15 BRPM | DIASTOLIC BLOOD PRESSURE: 80 MMHG

## 2023-02-01 DIAGNOSIS — R22.30 MASS OF FINGER: Primary | ICD-10-CM

## 2023-02-01 DIAGNOSIS — C44.622 SQUAMOUS CELL CANCER OF SKIN OF FINGER, RIGHT: ICD-10-CM

## 2023-02-01 PROCEDURE — 88342 IMHCHEM/IMCYTCHM 1ST ANTB: CPT | Mod: 26,,, | Performed by: PATHOLOGY

## 2023-02-01 PROCEDURE — 88342 CHG IMMUNOCYTOCHEMISTRY: ICD-10-PCS | Mod: 26,,, | Performed by: PATHOLOGY

## 2023-02-01 PROCEDURE — 15240 PR FULL THICK GRFT HEAD,FAC,HAND <20SQC: ICD-10-PCS | Mod: RT,,, | Performed by: ORTHOPAEDIC SURGERY

## 2023-02-01 PROCEDURE — 88331 PATH CONSLTJ SURG 1 BLK 1SPC: CPT | Mod: 26,,, | Performed by: PATHOLOGY

## 2023-02-01 PROCEDURE — 71000015 HC POSTOP RECOV 1ST HR: Performed by: ORTHOPAEDIC SURGERY

## 2023-02-01 PROCEDURE — 36000707: Performed by: ORTHOPAEDIC SURGERY

## 2023-02-01 PROCEDURE — 37000009 HC ANESTHESIA EA ADD 15 MINS: Performed by: ORTHOPAEDIC SURGERY

## 2023-02-01 PROCEDURE — 25000003 PHARM REV CODE 250: Performed by: NURSE ANESTHETIST, CERTIFIED REGISTERED

## 2023-02-01 PROCEDURE — 88307 TISSUE EXAM BY PATHOLOGIST: CPT | Mod: 26,,, | Performed by: PATHOLOGY

## 2023-02-01 PROCEDURE — 88305 TISSUE EXAM BY PATHOLOGIST: CPT | Performed by: PATHOLOGY

## 2023-02-01 PROCEDURE — 37000008 HC ANESTHESIA 1ST 15 MINUTES: Performed by: ORTHOPAEDIC SURGERY

## 2023-02-01 PROCEDURE — 88307 TISSUE EXAM BY PATHOLOGIST: CPT | Performed by: PATHOLOGY

## 2023-02-01 PROCEDURE — 63600175 PHARM REV CODE 636 W HCPCS

## 2023-02-01 PROCEDURE — 11624 PR EXC SKIN MALIG 3.1-4 CM REMAINDR BODY: ICD-10-PCS | Mod: 51,RT,, | Performed by: ORTHOPAEDIC SURGERY

## 2023-02-01 PROCEDURE — 11624 EXC S/N/H/F/G MAL+MRG 3.1-4: CPT | Mod: 51,RT,, | Performed by: ORTHOPAEDIC SURGERY

## 2023-02-01 PROCEDURE — 88305 TISSUE EXAM BY PATHOLOGIST: CPT | Mod: 26,,, | Performed by: PATHOLOGY

## 2023-02-01 PROCEDURE — 88305 TISSUE EXAM BY PATHOLOGIST: ICD-10-PCS | Mod: 26,,, | Performed by: PATHOLOGY

## 2023-02-01 PROCEDURE — 63600175 PHARM REV CODE 636 W HCPCS: Performed by: STUDENT IN AN ORGANIZED HEALTH CARE EDUCATION/TRAINING PROGRAM

## 2023-02-01 PROCEDURE — 63600175 PHARM REV CODE 636 W HCPCS: Performed by: ANESTHESIOLOGY

## 2023-02-01 PROCEDURE — 88331 PR  PATH CONSULT IN SURG,W FRZ SEC: ICD-10-PCS | Mod: 26,,, | Performed by: PATHOLOGY

## 2023-02-01 PROCEDURE — 63600175 PHARM REV CODE 636 W HCPCS: Performed by: NURSE ANESTHETIST, CERTIFIED REGISTERED

## 2023-02-01 PROCEDURE — 88331 PATH CONSLTJ SURG 1 BLK 1SPC: CPT | Performed by: PATHOLOGY

## 2023-02-01 PROCEDURE — 25000003 PHARM REV CODE 250: Performed by: ORTHOPAEDIC SURGERY

## 2023-02-01 PROCEDURE — 76942 ECHO GUIDE FOR BIOPSY: CPT | Performed by: ANESTHESIOLOGY

## 2023-02-01 PROCEDURE — 15240 FTH/GFT F/C/C/M/N/AX/G/H/F20: CPT | Mod: RT,,, | Performed by: ORTHOPAEDIC SURGERY

## 2023-02-01 PROCEDURE — 36000706: Performed by: ORTHOPAEDIC SURGERY

## 2023-02-01 PROCEDURE — 71000016 HC POSTOP RECOV ADDL HR: Performed by: ORTHOPAEDIC SURGERY

## 2023-02-01 PROCEDURE — 88342 IMHCHEM/IMCYTCHM 1ST ANTB: CPT | Performed by: PATHOLOGY

## 2023-02-01 PROCEDURE — 88307 PR  SURG PATH,LEVEL V: ICD-10-PCS | Mod: 26,,, | Performed by: PATHOLOGY

## 2023-02-01 RX ORDER — SODIUM CHLORIDE 0.9 % (FLUSH) 0.9 %
3 SYRINGE (ML) INJECTION
Status: DISCONTINUED | OUTPATIENT
Start: 2023-02-01 | End: 2023-02-01 | Stop reason: HOSPADM

## 2023-02-01 RX ORDER — FENTANYL CITRATE 50 UG/ML
INJECTION, SOLUTION INTRAMUSCULAR; INTRAVENOUS
Status: DISCONTINUED | OUTPATIENT
Start: 2023-02-01 | End: 2023-02-01

## 2023-02-01 RX ORDER — ROPIVACAINE HYDROCHLORIDE 5 MG/ML
INJECTION, SOLUTION EPIDURAL; INFILTRATION; PERINEURAL
Status: COMPLETED | OUTPATIENT
Start: 2023-02-01 | End: 2023-02-01

## 2023-02-01 RX ORDER — SODIUM CHLORIDE, SODIUM LACTATE, POTASSIUM CHLORIDE, CALCIUM CHLORIDE 600; 310; 30; 20 MG/100ML; MG/100ML; MG/100ML; MG/100ML
INJECTION, SOLUTION INTRAVENOUS CONTINUOUS
Status: DISCONTINUED | OUTPATIENT
Start: 2023-02-01 | End: 2023-02-01 | Stop reason: HOSPADM

## 2023-02-01 RX ORDER — PROCHLORPERAZINE EDISYLATE 5 MG/ML
5 INJECTION INTRAMUSCULAR; INTRAVENOUS EVERY 30 MIN PRN
Status: DISCONTINUED | OUTPATIENT
Start: 2023-02-01 | End: 2023-02-01 | Stop reason: HOSPADM

## 2023-02-01 RX ORDER — CEFAZOLIN SODIUM 1 G/3ML
2 INJECTION, POWDER, FOR SOLUTION INTRAMUSCULAR; INTRAVENOUS
Status: COMPLETED | OUTPATIENT
Start: 2023-02-01 | End: 2023-02-01

## 2023-02-01 RX ORDER — LIDOCAINE HYDROCHLORIDE 20 MG/ML
INJECTION INTRAVENOUS
Status: DISCONTINUED | OUTPATIENT
Start: 2023-02-01 | End: 2023-02-01

## 2023-02-01 RX ORDER — PROPOFOL 10 MG/ML
VIAL (ML) INTRAVENOUS CONTINUOUS PRN
Status: DISCONTINUED | OUTPATIENT
Start: 2023-02-01 | End: 2023-02-01

## 2023-02-01 RX ORDER — HYDROMORPHONE HYDROCHLORIDE 2 MG/ML
0.4 INJECTION, SOLUTION INTRAMUSCULAR; INTRAVENOUS; SUBCUTANEOUS EVERY 5 MIN PRN
Status: DISCONTINUED | OUTPATIENT
Start: 2023-02-01 | End: 2023-02-01 | Stop reason: HOSPADM

## 2023-02-01 RX ORDER — LIDOCAINE HYDROCHLORIDE 10 MG/ML
0.5 INJECTION, SOLUTION EPIDURAL; INFILTRATION; INTRACAUDAL; PERINEURAL ONCE
Status: DISCONTINUED | OUTPATIENT
Start: 2023-02-01 | End: 2023-02-01 | Stop reason: HOSPADM

## 2023-02-01 RX ORDER — BACITRACIN ZINC 500 UNIT/G
OINTMENT (GRAM) TOPICAL
Status: DISCONTINUED | OUTPATIENT
Start: 2023-02-01 | End: 2023-02-01 | Stop reason: HOSPADM

## 2023-02-01 RX ORDER — OXYCODONE HYDROCHLORIDE 5 MG/1
5 TABLET ORAL
Status: DISCONTINUED | OUTPATIENT
Start: 2023-02-01 | End: 2023-02-01 | Stop reason: HOSPADM

## 2023-02-01 RX ORDER — MEPERIDINE HYDROCHLORIDE 25 MG/ML
12.5 INJECTION INTRAMUSCULAR; INTRAVENOUS; SUBCUTANEOUS ONCE AS NEEDED
Status: DISCONTINUED | OUTPATIENT
Start: 2023-02-01 | End: 2023-02-01 | Stop reason: HOSPADM

## 2023-02-01 RX ORDER — PROPOFOL 10 MG/ML
VIAL (ML) INTRAVENOUS
Status: DISCONTINUED | OUTPATIENT
Start: 2023-02-01 | End: 2023-02-01

## 2023-02-01 RX ADMIN — FENTANYL CITRATE 25 MCG: 50 INJECTION, SOLUTION INTRAMUSCULAR; INTRAVENOUS at 08:02

## 2023-02-01 RX ADMIN — FENTANYL CITRATE 50 MCG: 50 INJECTION, SOLUTION INTRAMUSCULAR; INTRAVENOUS at 07:02

## 2023-02-01 RX ADMIN — CEFAZOLIN 2 G: 330 INJECTION, POWDER, FOR SOLUTION INTRAMUSCULAR; INTRAVENOUS at 08:02

## 2023-02-01 RX ADMIN — ROPIVACAINE HYDROCHLORIDE 20 ML: 5 INJECTION, SOLUTION EPIDURAL; INFILTRATION; PERINEURAL at 07:02

## 2023-02-01 RX ADMIN — LIDOCAINE HYDROCHLORIDE 25 MG: 20 INJECTION, SOLUTION INTRAVENOUS at 08:02

## 2023-02-01 RX ADMIN — SODIUM CHLORIDE, SODIUM LACTATE, POTASSIUM CHLORIDE, AND CALCIUM CHLORIDE: 600; 310; 30; 20 INJECTION, SOLUTION INTRAVENOUS at 07:02

## 2023-02-01 RX ADMIN — PROPOFOL 25 MCG/KG/MIN: 10 INJECTION, EMULSION INTRAVENOUS at 08:02

## 2023-02-01 RX ADMIN — PROPOFOL 20 MG: 10 INJECTION, EMULSION INTRAVENOUS at 08:02

## 2023-02-01 NOTE — ANESTHESIA POSTPROCEDURE EVALUATION
Anesthesia Post Evaluation    Patient: Audrey Lama    Procedure(s) Performed: Procedure(s) (LRB):  EXCISION, MASS,RIGHT INDEX MASS/SQUAMOUS CELL (Right)  APPLICATION, GRAFT, SKIN, FULL-THICKNESS (Right)    Final Anesthesia Type: regional      Patient location during evaluation: OPS  Patient participation: Yes- Able to Participate  Level of consciousness: awake  Post-procedure vital signs: reviewed and stable  Pain management: adequate  Airway patency: patent    PONV status at discharge: No PONV  Anesthetic complications: no      Cardiovascular status: blood pressure returned to baseline and hemodynamically stable  Respiratory status: room air, unassisted and spontaneous ventilation  Hydration status: euvolemic  Follow-up not needed.          Vitals Value Taken Time   /70 02/01/23 0943   Temp 98 02/01/23 0943   Pulse 63 02/01/23 0943   Resp 14 02/01/23 0943   SpO2 95% 02/01/23 0943         No case tracking events are documented in the log.      Pain/Yury Score: No data recorded

## 2023-02-01 NOTE — PLAN OF CARE
Audrey Lama has met all discharge criteria from Phase II. Vital Signs are stable, ambulating  without difficulty. Discharge instructions given, patient verbalized understanding. Discharged from facility via wheelchair in stable condition.

## 2023-02-01 NOTE — OP NOTE
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 Iniguez of [facility] [place] [place] surgeon for right index finger squamous cell carcinoma after much discussion with the patient the patient's family we elected for surgical intervention risks and benefits were explained to the patient in clinic consents were signed in clinic    Procedure in detail with correct site was marked with the patient's participation in the holding area the patient underwent regional anesthesia was brought to the operative room placed in supine position underwent MAC anesthesia well-padded nonsterile tourniquet was placed on the right upper extremity the right upper extremity was prepped and draped normal sterile fashion a time-out was conducted and the correct site procedure to be indicated IV antibiotics given patient preoperatively the arm was elevated it was not exsanguinated the tourniquet was insufflated 250 mmHg the skin on the dorsal surface of the index finger including the mass was ellipsed this actually did include fairly wide margins but out of the inflammatory area it was tagged proximal long radial short with  suture passed off the back table refer to pathology for frozen section the wound bed measured 3.5 x 2.5 the areas irrigated copious amounts of normal saline after the pathologist called with the findings we did harvest a full-thickness skin graft from the forearm 3.5 x 2.5 it was ellipsed sutured into position over the wound defect dorsal index finger over the extensor tendon with chromic suture the donor site was then approximated with a towel clip after irrigation Vicryl Monocryl Dermabond closed the skin  Sterile dressing was applied tourniquet was deflated brisk capillary refill of food patient was placed in a well-padded splint tolerated procedure well was brought to recovery room in stable condition     Postop plans patient gets the dressing clean dry and intact we will follow up on the permanent section of note with the pathologist called into the room the peripheral margins were clear deep margin still had tumor however we were down to the paratenon of the extensor tendon and therefore either patient would require tendon transfer after the tendon was excised versus an amputation patient is 89 and we will discuss this with her      Significant Surgical Tasks Conducted by the Assistant(s), if Applicable: retraction    Complications: No    Estimated Blood Loss (EBL): * No values recorded between 2/1/2023  8:20 AM and 2/1/2023  9:45 AM *           Implants: * No implants in log *    Specimens:   Specimen (24h ago, onward)       Start     Ordered    02/01/23 0940  Specimen to Pathology, Surgery  Once        References:    Click here for ordering Quick Tip    02/01/23 0940    02/01/23 0911  Specimen to Pathology, Surgery Other  Once,   Status:  Canceled        Comments: Pre-op Diagnosis: 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 Right index  finger mass proximal long,radial short (frozen section)2.Right index finger mass second margin proximal long,radial short (permanent)     References:    Click here for ordering Quick Tip   Question Answer Comment   Procedure Type: Other    Specimen Class: Known or suspected malignancy    Which provider would you like to cc? 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 Right index finger mass proximal long,radial short.     References:    Click here for ordering Quick Tip   Question Answer Comment   Procedure Type: Other    Specimen Class: Known or suspected malignancy    Which provider would you like to cc? MARY MOORE    Release to patient Immediate        02/01/23 0856                            Condition: Good    Disposition: PACU - hemodynamically stable.    Attestation: I performed the procedure.    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     amLODIPine 10 MG tablet  Commonly known as: NORVASC  Take  1 tablet (10 mg total) by mouth once daily.     Anti-DiarrheaL (loperamide) 2 mg Tab  Generic drug: loperamide  TAKE ONE TABLET BY MOUTH TWICE DAILY AS NEEDED FOR DIARRHEA     atorvastatin 40 MG tablet  Commonly known as: LIPITOR  TAKE ONE TABLET BY MOUTH IN THE EVENING     ibuprofen 600 MG tablet  Commonly known as: ADVIL,MOTRIN  Take 1 tablet (600 mg total) by mouth every 8 (eight) hours as needed for Pain.     losartan 100 MG tablet  Commonly known as: COZAAR  TAKE ONE TABLET BY MOUTH EVERY DAY     metoprolol succinate 50 MG 24 hr tablet  Commonly known as: TOPROL-XL  TAKE ONE TABLET BY MOUTH EVERY DAY     mirtazapine 15 MG tablet  Commonly known as: REMERON  Take 1 tablet (15 mg total) by mouth every evening.     pantoprazole 40 MG tablet  Commonly known as: PROTONIX  Take 1 tablet (40 mg total) by mouth once daily.     sertraline 25 MG tablet  Commonly known as: ZOLOFT  Take 1 tablet (25 mg total) by mouth once daily. Begin with 1/2 tab and increase as tolerated to a full tab     traMADoL 50 mg tablet  Commonly known as: ULTRAM  Take 1 tablet (50 mg total) by mouth every 6 (six) hours as needed for Pain.            Discharge Procedure Orders   Diet Adult Regular     Other restrictions (specify):   Order Comments: No lifting with operative extremity     Leave dressing on - Keep it clean, dry, and intact until clinic visit     Notify your health care provider if you experience any of the following:  temperature >100.4     Notify your health care provider if you experience any of the following:  persistent nausea and vomiting or diarrhea     Notify your health care provider if you experience any of the following:  severe uncontrolled pain     Notify your health care provider if you experience any of the following:  redness, tenderness, or signs of infection (pain, swelling, redness, odor or green/yellow discharge around incision site)     Notify your health care provider if you experience any of the following:   difficulty breathing or increased cough     Notify your health care provider if you experience any of the following:  severe persistent headache     Notify your health care provider if you experience any of the following:  worsening rash     Notify your health care provider if you experience any of the following:  persistent dizziness, light-headedness, or visual disturbances     Notify your health care provider if you experience any of the following:  increased confusion or weakness     Weight bearing restrictions (specify):   Order Comments: NWB to operative extremity      Follow-up Information       Emilee Owen MD Follow up in 2 week(s).    Specialties: Hand Surgery, Orthopedic Surgery  Contact information:  [address]  [facility]  [facility] [id number] [phone]

## 2023-02-01 NOTE — BRIEF OP NOTE
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]     * Finger lesion [L98.9]    Procedure(s) (LRB):  EXCISION, MASS,RIGHT INDEX MASS/SQUAMOUS CELL (Right)  APPLICATION, GRAFT, SKIN, FULL-THICKNESS (Right)    Anesthesia: Regional    Operative Findings: see op note    Estimated Blood Loss: * No values recorded between 2/1/2023  8:20 AM and 2/1/2023  9:43 AM *         Specimens:   Specimen (24h ago, onward)       Start     Ordered    02/01/23 0911  Specimen to Pathology, Surgery Other  Once        Comments: Pre-op Diagnosis: 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 Right index finger mass proximal long,radial short (frozen section)2.Right index finger mass second margin proximal long,radial short (permanent)     References:    Click here for ordering Quick Tip   Question Answer Comment   Procedure Type: Other    Specimen Class: Known or suspected malignancy    Which provider would you like to cc? 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 Right index finger mass proximal long,radial short.     References:    Click here for ordering Quick Tip   Question  Answer Comment   Procedure Type: Other    Specimen Class: Known or suspected malignancy    Which provider would you like to cc? MARY MOORE    Release to patient Immediate        02/01/23 0856                      Discharge Note    OUTCOME: Patient tolerated treatment/procedure well without complication and is now ready for discharge.    DISPOSITION: Home or Self Care    FINAL DIAGNOSIS:  <principal problem not specified>    FOLLOWUP: In clinic    DISCHARGE INSTRUCTIONS:    Discharge Procedure Orders   Diet Adult Regular     Other restrictions (specify):   Order Comments: No lifting with operative extremity     Leave dressing on - Keep it clean, dry, and intact until clinic visit     Notify your health care provider if you experience any of the following:  temperature >100.4     Notify your health care provider if you experience any of the following:  persistent nausea and vomiting or diarrhea     Notify your health care provider if you experience any of the following:  severe uncontrolled pain     Notify your health care provider if you experience any of the following:  redness, tenderness, or signs of infection (pain, swelling, redness, odor or green/yellow discharge around incision site)     Notify your health care provider if you experience any of the following:  difficulty breathing or increased cough     Notify your health care provider if you experience any of the following:  severe persistent headache     Notify your health care provider if you experience any of the following:  worsening rash     Notify your health care provider if you experience any of the following:  persistent dizziness, light-headedness, or visual disturbances     Notify your health care provider if you experience any of the following:  increased confusion or weakness     Weight bearing restrictions (specify):   Order Comments: NWB to operative extremity

## 2023-02-01 NOTE — ANESTHESIA PROCEDURE NOTES
Peripheral Block    Patient location during procedure: holding area    Reason for block: primary anesthetic    Diagnosis: finger mass   Timeout: 2/1/2023 7:26 AM   End time: 2/1/2023 7:32 AM    Staffing  Authorizing Provider: Sushant Kaur MD  Performing Provider: Sushant Kaur MD    Preanesthetic Checklist  Completed: patient identified, IV checked, site marked, risks and benefits discussed, surgical consent, monitors and equipment checked, pre-op evaluation and timeout performed  Peripheral Block  Patient position: supine  Prep: ChloraPrep  Patient monitoring: heart rate, cardiac monitor, continuous pulse ox and frequent blood pressure checks  Block type: supraclavicular  Laterality: right  Injection technique: single shot  Needle  Needle type: Stimuplex   Needle gauge: 21 G  Needle length: 4 in  Needle localization: nerve stimulator and ultrasound guidance   -ultrasound image captured on disc.  Assessment  Injection assessment: negative aspiration, negative parasthesia and local visualized surrounding nerve  Paresthesia pain: none  Heart rate change: no  Slow fractionated injection: yes  Pain Tolerance: no complaints and comfortable throughout block  Medications:    Medications: ropivacaine (NAROPIN) injection 0.5% - Perineural   20 mL - 2/1/2023 7:30:00 AM

## 2023-02-06 ENCOUNTER — TELEPHONE (OUTPATIENT)
Dept: ORTHOPEDICS | Facility: CLINIC | Age: 88
End: 2023-02-06
Payer: MEDICARE

## 2023-02-06 NOTE — TELEPHONE ENCOUNTER
Patient's daughter returned my call asking if any of our PA's go to the [place] or [place]. I informed patient's daughter that we are located at [facility] and all post op follow ups have to be seen by the PA's located here. Patient says she likes the staff just wanted to see if they floated to other locations. Patient's daughter states she will keep her mother's appt for 2/15/23 with Ranjana De La Rosa at this location.

## 2023-02-06 NOTE — TELEPHONE ENCOUNTER
Left a detailed message for patient's daughter Zoya Jasso in regards to her message about her Post-Op appt. Advised to return the call to [phone].

## 2023-02-09 LAB
FINAL PATHOLOGIC DIAGNOSIS: NORMAL
FROZEN SECTION DIAGNOSIS: NORMAL
FROZEN SECTION FOOTNOTE: NORMAL
GROSS: NORMAL
Lab: NORMAL
MICROSCOPIC EXAM: NORMAL

## 2023-02-13 ENCOUNTER — TELEPHONE (OUTPATIENT)
Dept: NEUROLOGY | Facility: CLINIC | Age: 88
End: 2023-02-13
Payer: MEDICARE

## 2023-02-15 ENCOUNTER — OFFICE VISIT (OUTPATIENT)
Dept: ORTHOPEDICS | Facility: CLINIC | Age: 88
End: 2023-02-15
Payer: MEDICARE

## 2023-02-15 VITALS
DIASTOLIC BLOOD PRESSURE: 68 MMHG | HEIGHT: 64 IN | BODY MASS INDEX: 27.31 KG/M2 | HEART RATE: 63 BPM | WEIGHT: 160 LBS | SYSTOLIC BLOOD PRESSURE: 132 MMHG

## 2023-02-15 DIAGNOSIS — Z47.89 ORTHOPEDIC AFTERCARE: ICD-10-CM

## 2023-02-15 DIAGNOSIS — M25.641 DECREASED RANGE OF MOTION OF FINGER OF RIGHT HAND: ICD-10-CM

## 2023-02-15 DIAGNOSIS — C44.622 SQUAMOUS CELL CANCER OF SKIN OF FINGER, RIGHT: Primary | ICD-10-CM

## 2023-02-15 PROCEDURE — 99024 PR POST-OP FOLLOW-UP VISIT: ICD-10-PCS | Mod: POP,,, | Performed by: PHYSICIAN ASSISTANT

## 2023-02-15 PROCEDURE — 99024 POSTOP FOLLOW-UP VISIT: CPT | Mod: POP,,, | Performed by: PHYSICIAN ASSISTANT

## 2023-02-15 PROCEDURE — 99999 PR PBB SHADOW E&M-EST. PATIENT-LVL III: CPT | Mod: PBBFAC,,, | Performed by: PHYSICIAN ASSISTANT

## 2023-02-15 PROCEDURE — 99999 PR PBB SHADOW E&M-EST. PATIENT-LVL III: ICD-10-PCS | Mod: PBBFAC,,, | Performed by: PHYSICIAN ASSISTANT

## 2023-02-15 PROCEDURE — 99213 OFFICE O/P EST LOW 20 MIN: CPT | Mod: PBBFAC | Performed by: PHYSICIAN ASSISTANT

## 2023-02-15 NOTE — PROGRESS NOTES
"Ms. Lama is here today for a post-operative visit.  She is 14 days status post right index finger mass excision with application of full-thickness skin graft, graft harvest from the right forearm by Dr. Owen on 2/1/2023.  Procedure was performed with frozen section, additional margin was taken.  She reports that she is doing well.  Pain is mild, no current pain.  She is not taking pain medication.  She denies fever, chills, and sweats since the time of the surgery.     PATHOLOGY:  1. 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 Skin, right index finger mass 2nd margin, re-excision:   -NEGATIVE FOR INVASIVE SQUAMOUS CELL CARCINOMA  Microscopic examination: 1. Sections show a relatively symmetric proliferation of lobules, nests, and cords of pleomorphic keratocytes within the dermis. There is some degree of "glassy" hyalinization of the keratocytes in the center portions of the lobules. Tumor is at least 1.4 centimeters in depth. Negative for perineural invasion. Negative for lymphovascular invasion. The tumor extends to the deep biopsy edge. 2. Sections show fragments of skin, negative for invasive squamous cell carcinoma. AE1/AE3 (performed on blocks 2A through 2D) are negative for an occult squamous cell carcinoma. Immunohistochemical stains were reviewed with adequate positive controls.  Frozen section diagnosis: specimen 1 deep margin positive for SCC Dr. Green reported results to Dr. Owen and Nicolette@[id number] on 02/01/2023    Physical exam:    Vitals:    02/15/23 1308   BP: 132/68   Pulse: 63   Weight: 72.6 kg (160 lb)   Height: 5' 4" (1.626 m)   PainSc:   2     Vital signs are stable, patient is afebrile.  Patient is well dressed and well groomed, no acute distress.  Alert and oriented to person, place, and time.  Post op dressing taken down.  Incision and graft site are healing- clean, " dry, and intact.  There is no erythema or exudate.  There is no sign of any infection. She is NVI. Suture tails removed from the forearm without difficulty.  Chromic suture tails left in place over the finger    Assessment: 14 days status post right index finger mass excision with application of full-thickness skin graft, graft harvest from the right forearm    Plan:  Audrey was seen today for post-op evaluation.    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 Blackwood; she spoke with patient and her family after surgery about margin and excision extent.  Patient and family not interested in amputation.   - soft wrap applied  - Discussed scar massage  - Follow up in 2 weeks for skin recheck  - Call with questions or concerns      Per Op note: Postop plans patient gets the dressing clean dry and intact we will follow up on the permanent section of note with the pathologist called into the room the peripheral margins were clear deep margin still had tumor however we were down to the paratenon of the extensor tendon and therefore either patient would require tendon transfer after the tendon was excised versus an amputation patient is 89 and we will discuss this with her.

## 2023-03-01 ENCOUNTER — EXTERNAL HOME HEALTH (OUTPATIENT)
Dept: HOME HEALTH SERVICES | Facility: HOSPITAL | Age: 88
End: 2023-03-01
Payer: MEDICARE

## 2023-03-02 ENCOUNTER — OFFICE VISIT (OUTPATIENT)
Dept: ORTHOPEDICS | Facility: CLINIC | Age: 88
End: 2023-03-02
Payer: MEDICARE

## 2023-03-02 VITALS — BODY MASS INDEX: 27.33 KG/M2 | HEIGHT: 64 IN | WEIGHT: 160.06 LBS

## 2023-03-02 DIAGNOSIS — C44.622 SQUAMOUS CELL CANCER OF SKIN OF FINGER, RIGHT: Primary | ICD-10-CM

## 2023-03-02 DIAGNOSIS — M25.641 DECREASED RANGE OF MOTION OF FINGER OF RIGHT HAND: ICD-10-CM

## 2023-03-02 DIAGNOSIS — Z47.89 ORTHOPEDIC AFTERCARE: ICD-10-CM

## 2023-03-02 PROCEDURE — 99999 PR PBB SHADOW E&M-EST. PATIENT-LVL III: CPT | Mod: PBBFAC,,, | Performed by: PHYSICIAN ASSISTANT

## 2023-03-02 PROCEDURE — 99999 PR PBB SHADOW E&M-EST. PATIENT-LVL III: ICD-10-PCS | Mod: PBBFAC,,, | Performed by: PHYSICIAN ASSISTANT

## 2023-03-02 PROCEDURE — 99213 OFFICE O/P EST LOW 20 MIN: CPT | Mod: PBBFAC | Performed by: PHYSICIAN ASSISTANT

## 2023-03-02 PROCEDURE — 99024 PR POST-OP FOLLOW-UP VISIT: ICD-10-PCS | Mod: POP,,, | Performed by: PHYSICIAN ASSISTANT

## 2023-03-02 PROCEDURE — 99024 POSTOP FOLLOW-UP VISIT: CPT | Mod: POP,,, | Performed by: PHYSICIAN ASSISTANT

## 2023-03-02 RX ORDER — CEPHALEXIN 250 MG/1
250 CAPSULE ORAL EVERY 8 HOURS
Qty: 21 CAPSULE | Refills: 0 | Status: SHIPPED | OUTPATIENT
Start: 2023-03-02 | End: 2023-07-11 | Stop reason: ALTCHOICE

## 2023-03-02 NOTE — PROGRESS NOTES
"Ms. Lama is here today for a post-operative visit.  She is 29 days status post right index finger mass excision with application of full-thickness skin graft, graft harvest from the right forearm by Dr. Owen on 2/1/2023.  Procedure was performed with frozen section, additional margin was taken. Her family reports that she has not been complaining of pain.  Her daughter has been applying vitamin E oil to the incisions and graft site daily.  She is not taking pain medication.  She denies fever, chills, and sweats since the time of the surgery.     PATHOLOGY:  1. 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 Skin, right index finger mass 2nd margin, re-excision:   -NEGATIVE FOR INVASIVE SQUAMOUS CELL CARCINOMA  Microscopic examination: 1. Sections show a relatively symmetric proliferation of lobules, nests, and cords of pleomorphic keratocytes within the dermis. There is some degree of "glassy" hyalinization of the keratocytes in the center portions of the lobules. Tumor is at least 1.4 centimeters in depth. Negative for perineural invasion. Negative for lymphovascular invasion. The tumor extends to the deep biopsy edge. 2. Sections show fragments of skin, negative for invasive squamous cell carcinoma. AE1/AE3 (performed on blocks 2A through 2D) are negative for an occult squamous cell carcinoma. Immunohistochemical stains were reviewed with adequate positive controls.  Frozen section diagnosis: specimen 1 deep margin positive for SCC Dr. Green reported results to Dr. Owen and Nicolette@[id number] on 02/01/2023    Physical exam:    Vitals:    03/02/23 0957   Weight: 72.6 kg (160 lb 0.9 oz)   Height: 5' 4" (1.626 m)   PainSc: 0-No pain     Vital signs are stable, patient is afebrile.  Patient is well dressed and well groomed, no acute distress.  Alert and oriented to person, place, and time.  Incision " and graft site are healing- clean, dry, and intact. Mild edema noted. There seems to be increased erythema from prior visit.  No increased warmth of the skin. There is no sign of any infection. She is NVI.  Chromic suture tails in place over the finger, removed today in clinic. Fair right index finger motion.     Assessment: 29 days status post right index finger mass excision with application of full-thickness skin graft, graft harvest from the right forearm    Plan:  Audrey was seen today for post-op evaluation.    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            - Discussed avoiding vitamin E until graft site/wound are fully healed  - Keflex to pharmacy   - Follow up in 2-3 weeks for skin recheck, sooner if needed  - Call with questions or concerns      Per Op note: Postop plans patient gets the dressing clean dry and intact we will follow up on the permanent section of note with the pathologist called into the room the peripheral margins were clear deep margin still had tumor however we were down to the paratenon of the extensor tendon and therefore either patient would require tendon transfer after the tendon was excised versus an amputation patient is 89 and we will discuss this with her.

## 2023-03-03 ENCOUNTER — PATIENT MESSAGE (OUTPATIENT)
Dept: ORTHOPEDICS | Facility: CLINIC | Age: 88
End: 2023-03-03
Payer: MEDICARE

## 2023-03-05 ENCOUNTER — DOCUMENT SCAN (OUTPATIENT)
Dept: HOME HEALTH SERVICES | Facility: HOSPITAL | Age: 88
End: 2023-03-05
Payer: MEDICARE

## 2023-03-22 ENCOUNTER — TELEPHONE (OUTPATIENT)
Dept: ORTHOPEDICS | Facility: CLINIC | Age: 88
End: 2023-03-22
Payer: MEDICARE

## 2023-03-23 ENCOUNTER — OFFICE VISIT (OUTPATIENT)
Dept: ORTHOPEDICS | Facility: CLINIC | Age: 88
End: 2023-03-23
Payer: MEDICARE

## 2023-03-23 VITALS — WEIGHT: 160 LBS | BODY MASS INDEX: 27.31 KG/M2 | HEIGHT: 64 IN

## 2023-03-23 DIAGNOSIS — Z98.890 POST-OPERATIVE STATE: Primary | ICD-10-CM

## 2023-03-23 PROCEDURE — 99213 OFFICE O/P EST LOW 20 MIN: CPT | Mod: PBBFAC | Performed by: PHYSICIAN ASSISTANT

## 2023-03-23 PROCEDURE — 99999 PR PBB SHADOW E&M-EST. PATIENT-LVL III: CPT | Mod: PBBFAC,,, | Performed by: PHYSICIAN ASSISTANT

## 2023-03-23 PROCEDURE — 99999 PR PBB SHADOW E&M-EST. PATIENT-LVL III: ICD-10-PCS | Mod: PBBFAC,,, | Performed by: PHYSICIAN ASSISTANT

## 2023-03-23 PROCEDURE — 99024 PR POST-OP FOLLOW-UP VISIT: ICD-10-PCS | Mod: POP,,, | Performed by: PHYSICIAN ASSISTANT

## 2023-03-23 PROCEDURE — 99024 POSTOP FOLLOW-UP VISIT: CPT | Mod: POP,,, | Performed by: PHYSICIAN ASSISTANT

## 2023-03-23 NOTE — PROGRESS NOTES
"Ms. Lama is here today for a post-operative visit.  She is 6 wks status post right index finger mass excision with application of full-thickness skin graft, graft harvest from the right forearm by Dr. Owen on 2/1/2023. Pathology with clear peripheral margin but tumor present in deep margin, Dr. Owen previously discussed options with patient and family they are not interested in further surgery, amputation. She was placed on Keflex at last visit due to increased erythema. Today she presents with her family who report she is doing well. Erythema is improved from prior. She has completed abx. She has some stiffness with flexion but has refused therapy. They are working on scar massage. She is not taking pain medication.  She denies fever, chills, and sweats since the time of the surgery.     Physical exam:    Vitals:    03/23/23 1015   Weight: 72.6 kg (160 lb)   Height: 5' 4" (1.626 m)   PainSc: 0-No pain     Vital signs are stable, patient is afebrile.  Patient is well dressed and well groomed, no acute distress.  Alert and oriented to person, place, and time.  Incision is well healed. Mild edema noted.  No increased warmth of the skin. There is no sign of any infection. She is NVI.  Decreased full flexion. She has minimal tenderness over the radial aspect of the incision.      PATHOLOGY:  1. 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 Skin, right index finger mass 2nd margin, re-excision:   -NEGATIVE FOR INVASIVE SQUAMOUS CELL CARCINOMA  Microscopic examination: 1. Sections show a relatively symmetric proliferation of lobules, nests, and cords of pleomorphic keratocytes within the dermis. There is some degree of "glassy" hyalinization of the keratocytes in the center portions of the lobules. Tumor is at least 1.4 centimeters in depth. Negative for perineural invasion. Negative for " lymphovascular invasion. The tumor extends to the deep biopsy edge. 2. Sections show fragments of skin, negative for invasive squamous cell carcinoma. AE1/AE3 (performed on blocks 2A through 2D) are negative for an occult squamous cell carcinoma. Immunohistochemical stains were reviewed with adequate positive controls.  Frozen section diagnosis: specimen 1 deep margin positive for SCC Dr. Green reported results to Dr. Owen and Nicolette@[id number] on 02/01/2023      Assessment:  status post right index finger mass excision with application of full-thickness skin graft, graft harvest from the right forearm    Plan:  Audrey was seen today for follow-up.    Diagnoses and all orders for this visit:    Post-operative state      Doing well   Continue scar work, silicone scar patches provided   Discussed home ROM exercises   RTC as needed      Per Op note: Postop plans patient gets the dressing clean dry and intact we will follow up on the permanent section of note with the pathologist called into the room the peripheral margins were clear deep margin still had tumor however we were down to the paratenon of the extensor tendon and therefore either patient would require tendon transfer after the tendon was excised versus an amputation patient is 89 and we will discuss this with her. Dr. Owen previously discussed further surgery with patient and family they are not interested in amputation.

## 2023-04-10 ENCOUNTER — PATIENT MESSAGE (OUTPATIENT)
Dept: HOME HEALTH SERVICES | Facility: CLINIC | Age: 88
End: 2023-04-10
Payer: MEDICARE

## 2023-04-10 ENCOUNTER — PATIENT MESSAGE (OUTPATIENT)
Dept: INTERNAL MEDICINE | Facility: CLINIC | Age: 88
End: 2023-04-10
Payer: MEDICARE

## 2023-04-10 DIAGNOSIS — N30.90 CYSTITIS: Primary | ICD-10-CM

## 2023-04-10 RX ORDER — NITROFURANTOIN 25; 75 MG/1; MG/1
100 CAPSULE ORAL 2 TIMES DAILY
Qty: 10 CAPSULE | Refills: 0 | Status: SHIPPED | OUTPATIENT
Start: 2023-04-10 | End: 2023-04-15

## 2023-05-02 ENCOUNTER — PATIENT MESSAGE (OUTPATIENT)
Dept: HOME HEALTH SERVICES | Facility: CLINIC | Age: 88
End: 2023-05-02
Payer: MEDICARE

## 2023-05-15 ENCOUNTER — PATIENT MESSAGE (OUTPATIENT)
Dept: INTERNAL MEDICINE | Facility: CLINIC | Age: 88
End: 2023-05-15
Payer: MEDICARE

## 2023-05-16 ENCOUNTER — PATIENT MESSAGE (OUTPATIENT)
Dept: HOME HEALTH SERVICES | Facility: CLINIC | Age: 88
End: 2023-05-16
Payer: MEDICARE

## 2023-05-16 ENCOUNTER — PATIENT MESSAGE (OUTPATIENT)
Dept: INTERNAL MEDICINE | Facility: CLINIC | Age: 88
End: 2023-05-16
Payer: MEDICARE

## 2023-06-27 DIAGNOSIS — I10 PRIMARY HYPERTENSION: ICD-10-CM

## 2023-06-27 RX ORDER — AMLODIPINE BESYLATE 10 MG/1
TABLET ORAL
Qty: 30 TABLET | Refills: 5 | Status: SHIPPED | OUTPATIENT
Start: 2023-06-27 | End: 2024-01-15 | Stop reason: SDUPTHER

## 2023-06-27 RX ORDER — ATORVASTATIN CALCIUM 40 MG/1
TABLET, FILM COATED ORAL
Qty: 30 TABLET | Refills: 5 | Status: SHIPPED | OUTPATIENT
Start: 2023-06-27 | End: 2023-07-11

## 2023-06-27 RX ORDER — LOSARTAN POTASSIUM 100 MG/1
TABLET ORAL
Qty: 30 TABLET | Refills: 5 | Status: SHIPPED | OUTPATIENT
Start: 2023-06-27

## 2023-06-27 RX ORDER — METOPROLOL SUCCINATE 50 MG/1
TABLET, EXTENDED RELEASE ORAL
Qty: 30 TABLET | Refills: 5 | Status: SHIPPED | OUTPATIENT
Start: 2023-06-27 | End: 2024-01-15 | Stop reason: SDUPTHER

## 2023-06-28 ENCOUNTER — TELEPHONE (OUTPATIENT)
Dept: HOME HEALTH SERVICES | Facility: CLINIC | Age: 88
End: 2023-06-28
Payer: MEDICARE

## 2023-06-28 ENCOUNTER — PATIENT MESSAGE (OUTPATIENT)
Dept: HOME HEALTH SERVICES | Facility: CLINIC | Age: 88
End: 2023-06-28
Payer: MEDICARE

## 2023-06-28 NOTE — TELEPHONE ENCOUNTER
Left voicemail for CG stating meds were sent to the pharmacy and that patient is due for a follow up visit.  Also sent my chart message.

## 2023-07-07 ENCOUNTER — CARE AT HOME (OUTPATIENT)
Dept: HOME HEALTH SERVICES | Facility: CLINIC | Age: 88
End: 2023-07-07
Payer: MEDICARE

## 2023-07-07 DIAGNOSIS — N18.30 CONTROLLED TYPE 2 DIABETES MELLITUS WITH STAGE 3 CHRONIC KIDNEY DISEASE, WITHOUT LONG-TERM CURRENT USE OF INSULIN: ICD-10-CM

## 2023-07-07 DIAGNOSIS — Z78.9 IMPAIRED MOBILITY AND ACTIVITIES OF DAILY LIVING: Primary | ICD-10-CM

## 2023-07-07 DIAGNOSIS — I70.0 CALCIFICATION OF AORTA: ICD-10-CM

## 2023-07-07 DIAGNOSIS — Z74.09 IMPAIRED MOBILITY AND ACTIVITIES OF DAILY LIVING: Primary | ICD-10-CM

## 2023-07-07 DIAGNOSIS — E11.22 CONTROLLED TYPE 2 DIABETES MELLITUS WITH STAGE 3 CHRONIC KIDNEY DISEASE, WITHOUT LONG-TERM CURRENT USE OF INSULIN: ICD-10-CM

## 2023-07-07 DIAGNOSIS — F33.0 MAJOR DEPRESSIVE DISORDER, RECURRENT, MILD: ICD-10-CM

## 2023-07-07 PROCEDURE — 99349 PR HOME VISIT,ESTAB PATIENT,LEVEL III: ICD-10-PCS | Mod: S$GLB,,, | Performed by: NURSE PRACTITIONER

## 2023-07-07 PROCEDURE — 99349 HOME/RES VST EST MOD MDM 40: CPT | Mod: S$GLB,,, | Performed by: NURSE PRACTITIONER

## 2023-07-11 NOTE — ASSESSMENT & PLAN NOTE
Chronic and stable  Pt with some anxiety at times  No thoughts of harm  Improved with sertraline and remeron  Continue current plan and meds  Notify for any worsening symptoms  Monitor

## 2023-07-11 NOTE — PROGRESS NOTES
[facility]  Medical Home Visit    Visit Date: 7/11/2023  Encounter Provider: Sandra Palmer  PCP:  Sandra Palmer NP    Subjective:      Patient ID: Audrey Lama is a 89 y.o. female.    Consult Requested By:  No ref. provider found  Reason for Consult:  Establish Care    Audrey is being seen at home due to being seen at home due to physical debility that presents a taxing effort to leave the home, to mitigate high risk of hospital readmission and/or due to the limited availability of reliable or safe options for transportation to the point of access to the provider. Prior to treatment on this visit the chart was reviewed and patient verbal consent was obtained.    Chief Complaint: No chief complaint on file.          Audrey is found AAOx2, ambulating with a walker. She denies any complaints today and reports she is in her usual state of health. She is compliant with her medications, but reports she does not want to take all of them. Reports she is 89 y/o and does not need all those meds. She would like take less meds. PPI is not helping her belching. Daughter has stopped that.     Reviewed her medications as she would like to reduce her med burden. High normal readings of b/p today, discussed her need to continue HTN meds and beta blocker for her heart. She stated understanding. She would like to discontinue her statin. She understands the risk vs benefit profile.    She has a cancer to her right index finger. Derm has excised tumor and performed a skin graft. It has healed well and is not giving any problems at this time               Review of Systems   Constitutional:  Negative for activity change, fatigue and fever.   HENT:  Positive for hearing loss.    Respiratory:  Negative for chest tightness.    Cardiovascular: Negative.  Negative for leg swelling.   Gastrointestinal: Negative.         Frequent belching   Endocrine: Negative.    Genitourinary: Negative.    Musculoskeletal: Negative.    Skin:   Positive for wound (right index finger, see photo).   Allergic/Immunologic: Negative.    Neurological: Negative.    Hematological: Negative.    Psychiatric/Behavioral: Negative.  Negative for agitation.    All other systems reviewed and are negative.      Assessments:  Environmental: lives with daughter, single story home  Functional Status: requires some assistance  Safety: fall risk  Nutritional: adequate  Home Health/DME/Supplies: walker    Objective:   Physical Exam  Vitals reviewed.   Constitutional:       General: She is not in acute distress.     Appearance: She is well-developed.   HENT:      Head: Normocephalic and atraumatic.      Nose: Nose normal.      Mouth/Throat:      Mouth: Mucous membranes are dry.   Eyes:      Pupils: Pupils are equal, round, and reactive to light.   Cardiovascular:      Rate and Rhythm: Normal rate and regular rhythm.      Heart sounds: Normal heart sounds.   Pulmonary:      Effort: Pulmonary effort is normal.      Breath sounds: Normal breath sounds.   Abdominal:      General: Bowel sounds are normal.      Palpations: Abdomen is soft.   Musculoskeletal:         General: Normal range of motion.      Cervical back: Normal range of motion and neck supple.   Skin:     General: Skin is warm and dry.   Neurological:      Mental Status: She is alert and oriented to person, place, and time.      Cranial Nerves: No cranial nerve deficit.   Psychiatric:         Behavior: Behavior normal.         Thought Content: Thought content normal.         Judgment: Judgment normal.         There were no vitals filed for this visit.    There is no height or weight on file to calculate BMI.    Assessment:     1. Impaired mobility and activities of daily living    2. Controlled type 2 diabetes mellitus with stage 3 chronic kidney disease, without long-term current use of insulin    3. Major depressive disorder, recurrent, mild    4. Calcification of aorta          Plan:     Ethical / Legal: Advance Care  Planning   Surrogate decision maker:  Name Zoya davila, Relationship: daughter  Code Status:  DNR  LaPOST:  Not on file  Capacity to make medical decisions:  Intact, Conflict: None       Problem List Items Addressed This Visit          Psychiatric    Major depressive disorder, recurrent, mild    Current Assessment & Plan     Chronic and stable  Pt with some anxiety at times  No thoughts of harm  Improved with sertraline and remeron  Continue current plan and meds  Notify for any worsening symptoms  Monitor              Cardiac/Vascular    Calcification of aorta    Overview     CXR 9/15/11         Current Assessment & Plan     Noted on imaging  Statin deferred to age  Monitor            Endocrine    Controlled type 2 diabetes mellitus with stage 3 chronic kidney disease, without long-term current use of insulin    Current Assessment & Plan     Stable  No current meds  Diet controlled  ADA diet  Monitor            Other    Impaired mobility and activities of daily living - Primary    Current Assessment & Plan     Impaired mobility  Using a walker for mobility  Frail, fall risk  Monitor               Were controlled substances prescribed?  No  Total visit time: 45 min  Follow Up Appointments:   No future appointments.      Signature: Sandra Palmer NP

## 2023-07-22 ENCOUNTER — PATIENT MESSAGE (OUTPATIENT)
Dept: INTERNAL MEDICINE | Facility: CLINIC | Age: 88
End: 2023-07-22
Payer: MEDICARE

## 2023-07-22 ENCOUNTER — PATIENT MESSAGE (OUTPATIENT)
Dept: HOME HEALTH SERVICES | Facility: CLINIC | Age: 88
End: 2023-07-22
Payer: MEDICARE

## 2023-10-11 ENCOUNTER — TELEPHONE (OUTPATIENT)
Dept: HOME HEALTH SERVICES | Facility: CLINIC | Age: 88
End: 2023-10-11
Payer: MEDICARE

## 2023-10-11 DIAGNOSIS — N18.30 CONTROLLED TYPE 2 DIABETES MELLITUS WITH STAGE 3 CHRONIC KIDNEY DISEASE, WITHOUT LONG-TERM CURRENT USE OF INSULIN: Primary | ICD-10-CM

## 2023-10-11 DIAGNOSIS — I70.0 CALCIFICATION OF AORTA: ICD-10-CM

## 2023-10-11 DIAGNOSIS — Z78.9 IMPAIRED MOBILITY AND ACTIVITIES OF DAILY LIVING: ICD-10-CM

## 2023-10-11 DIAGNOSIS — E11.22 CONTROLLED TYPE 2 DIABETES MELLITUS WITH STAGE 3 CHRONIC KIDNEY DISEASE, WITHOUT LONG-TERM CURRENT USE OF INSULIN: Primary | ICD-10-CM

## 2023-10-11 DIAGNOSIS — Z74.09 IMPAIRED MOBILITY AND ACTIVITIES OF DAILY LIVING: ICD-10-CM

## 2023-10-11 NOTE — TELEPHONE ENCOUNTER
Orders placed per NP request for Bedside commode.  Orders faxed to [facility].  Fax confirmation received.

## 2023-11-06 ENCOUNTER — CARE AT HOME (OUTPATIENT)
Dept: HOME HEALTH SERVICES | Facility: CLINIC | Age: 88
End: 2023-11-06
Payer: MEDICARE

## 2023-11-06 DIAGNOSIS — I10 PRIMARY HYPERTENSION: ICD-10-CM

## 2023-11-06 DIAGNOSIS — M51.36 DDD (DEGENERATIVE DISC DISEASE), LUMBAR: Primary | ICD-10-CM

## 2023-11-06 DIAGNOSIS — R53.81 DEBILITY: ICD-10-CM

## 2023-11-06 PROCEDURE — 99349 HOME/RES VST EST MOD MDM 40: CPT | Mod: S$GLB,,, | Performed by: NURSE PRACTITIONER

## 2023-11-06 PROCEDURE — 99349 PR HOME VISIT,ESTAB PATIENT,LEVEL III: ICD-10-PCS | Mod: S$GLB,,, | Performed by: NURSE PRACTITIONER

## 2023-11-06 NOTE — PROGRESS NOTES
[facility]  Medical Home Visit    Visit Date: 11/7/2023  Encounter Provider: Sandra Palmer  PCP:  Sandra Palmer NP    Subjective:      Patient ID: Audrey Lama is a 89 y.o. female.    Consult Requested By:  No ref. provider found  Reason for Consult:  Establish Care    Audrey is being seen at home due to being seen at home due to physical debility that presents a taxing effort to leave the home, to mitigate high risk of hospital readmission and/or due to the limited availability of reliable or safe options for transportation to the point of access to the provider. Prior to treatment on this visit the chart was reviewed and patient verbal consent was obtained.    Chief Complaint: Follow-up      Audrey is found AAOx2,found sitting in a chair in her bedroom. She denies any complaints today and reports she is in her usual state of health. She is compliant with her medications.     Daughter reports mother is declining related to mobility and daily activities.  No longer going to sit outside. Ambulating with her walker but slow and more difficult.  Confusion increasing and pt with some paranoia.  Daughter is using distraction for behaviors    Daughter reports bedside commode is broken.  Seat and chair portion are in disrepair.                 Review of Systems   Constitutional:  Negative for activity change, fatigue and fever.   HENT:  Positive for hearing loss.    Respiratory:  Negative for chest tightness.    Cardiovascular: Negative.  Negative for leg swelling.   Gastrointestinal: Negative.         Frequent belching   Endocrine: Negative.    Genitourinary: Negative.    Musculoskeletal: Negative.    Skin:  Positive for wound (right index finger, see photo).   Allergic/Immunologic: Negative.    Neurological: Negative.    Hematological: Negative.    Psychiatric/Behavioral: Negative.  Negative for agitation.    All other systems reviewed and are negative.        Assessments:  Environmental: lives with  daughter, single story home  Functional Status: requires some assistance  Safety: fall risk  Nutritional: adequate  Home Health/DME/Supplies: walker    Objective:   Physical Exam  Vitals reviewed.   Constitutional:       General: She is not in acute distress.     Appearance: She is well-developed.   HENT:      Head: Normocephalic and atraumatic.      Nose: Nose normal.      Mouth/Throat:      Mouth: Mucous membranes are dry.   Eyes:      Pupils: Pupils are equal, round, and reactive to light.   Cardiovascular:      Rate and Rhythm: Normal rate and regular rhythm.      Heart sounds: Normal heart sounds.   Pulmonary:      Effort: Pulmonary effort is normal.      Breath sounds: Normal breath sounds.   Abdominal:      General: Bowel sounds are normal.      Palpations: Abdomen is soft.   Musculoskeletal:         General: Normal range of motion.      Cervical back: Normal range of motion and neck supple.   Skin:     General: Skin is warm and dry.   Neurological:      Mental Status: She is alert and oriented to person, place, and time.      Cranial Nerves: No cranial nerve deficit.   Psychiatric:         Behavior: Behavior normal.         Thought Content: Thought content normal.         Judgment: Judgment normal.           Vitals:    11/06/23 1102   BP: 124/62   Pulse: 70   Resp: 16   PainSc: 0-No pain       There is no height or weight on file to calculate BMI.    Assessment:     1. DDD (degenerative disc disease), lumbar    2. Debility          Plan:     Ethical / Legal: Advance Care Planning   Surrogate decision maker:  Name Zoya davila, Relationship: daughter  Code Status:  DNR  LaPOST:  Not on file  Capacity to make medical decisions:  Intact, Conflict: None       Problem List Items Addressed This Visit          Neuro    DDD (degenerative disc disease), lumbar - Primary    Current Assessment & Plan     Chronic  Reducing mobility  Use walker at all times  Declines PT due to age  Reviewed fall precautions.          Relevant Orders    COMMODE FOR HOME USE     Other Visit Diagnoses       Debility        Relevant Orders    COMMODE FOR HOME USE               Were controlled substances prescribed?  No  Total visit time: 45 min  Follow Up Appointments:   No future appointments.      Signature: Sandra Palmer NP

## 2023-11-06 NOTE — PROGRESS NOTES
[facility]  Medical Chronic Care Home Visit    Encounter Provider: Sandra Palmer   PCP: Sandra Palmer, NP  Consult Requested By: No ref. provider found    HISTORY OF PRESENT ILLNESS      Patient ID: Audrey Lama is a 89 y.o. female is being seen in the home due to physical debility that presents a taxing effort to leave the home, to mitigate high risk of hospital readmission and/or due to the limited availability of reliable or safe options for transportation to the point of access to the provider. Prior to treatment on this visit the chart was reviewed and patient verbal consent was obtained.    Chronic medical conditions synopsis:        DECISION MAKING TODAY       Assessment & Plan:  1. DDD (degenerative disc disease), lumbar  Assessment & Plan:  Chronic  Reducing mobility  Use walker at all times  Declines PT due to age  Reviewed fall precautions.    Orders:  -     COMMODE FOR HOME USE    2. Debility  -     COMMODE FOR HOME USE    3. Primary hypertension  Assessment & Plan:  Bp at goal today  Continue current plan  Monitor             ENVIRONMENT OF CARE      Family and/or Caregiver present at visit?  Yes  Name of Caregiver:   History provided by: caregiver    Advance Care Planning   Advanced Care Planning Status:  Patient has had an ACP conversation  Living Will: No  Power of : No  LaPOST: No         Impression upon entering the home:  Physical Dwelling: single family home   Appearance of home environment: cleaniness: clean  Functional Status: moderate assistance  Mobility: ambulatory with device  Nutritional access: adequate intake and access  Home Health: No, and does not need it at this time   DME/Supplies: rolling walker and bedside commode     .   Does patient have a PCP at [facility]? Yes   Repatriation plan with PCP? [facility] reason: mobility   Does patient have an ostomy (ileostomy, colostomy, suprapubic catheter, nephrostomy tube, tracheostomy, PEG tube, pleurex catheter,  cholecystostomy, etc)? No  Were BPAs reviewed? Yes    Social History     Socioeconomic History    Marital status:    Tobacco Use    Smoking status: Never    Smokeless tobacco: Never   Substance and Sexual Activity    Alcohol use: Not Currently    Drug use: Never    Sexual activity: Not Currently     Social Determinants of Health     Financial Resource Strain: Low Risk  (5/11/2021)    Overall Financial Resource Strain (CARDIA)     Difficulty of Paying Living Expenses: Not very hard   Food Insecurity: No Food Insecurity (5/11/2021)    Hunger Vital Sign     Worried About Running Out of Food in the Last Year: Never true     Ran Out of Food in the Last Year: Never true   Transportation Needs: No Transportation Needs (5/11/2021)    PRAPARE - Transportation     Lack of Transportation (Medical): No     Lack of Transportation (Non-Medical): No   Physical Activity: Inactive (5/11/2021)    Exercise Vital Sign     Days of Exercise per Week: 0 days     Minutes of Exercise per Session: 0 min   Stress: No Stress Concern Present (5/11/2021)    [employer] [place] [employer] - Occupational Stress Questionnaire     Feeling of Stress : Only a little   Social Connections: Moderately Isolated (5/11/2021)    Social Connection and Isolation Panel [NHANES]     Frequency of Communication with Friends and Family: More than three times a week     Frequency of Social Gatherings with Friends and Family: Twice a week     Attends Hinduism Services: 1 to 4 times per year     Active Member of Clubs or Organizations: No     Attends Club or Organization Meetings: Never     Marital Status:    Housing Stability: Low Risk  (5/11/2021)    Housing Stability Vital Sign     Unable to Pay for Housing in the Last Year: No     Number of Places Lived in the Last Year: 1     Unstable Housing in the Last Year: No         OBJECTIVE:     Vital Signs:  Vitals:    11/06/23 1102   BP: 124/62   Pulse: 70   Resp: 16       Review of  Systems    Physical Exam:  Physical Exam    INSTRUCTIONS FOR PATIENT:     Scheduled Follow-up, Appts Reviewed with Modifications if Needed: Yes  No future appointments.      Current Outpatient Medications:     acetaminophen (TYLENOL) 500 MG tablet, Take 2 tablets (1,000 mg total) by mouth every 12 (twelve) hours as needed for Pain., Disp: 56 tablet, Rfl: 0    amLODIPine (NORVASC) 10 MG tablet, TAKE ONE TABLET BY MOUTH EVERY DAY, Disp: 30 tablet, Rfl: 5    ANTI-DIARRHEAL, LOPERAMIDE, 2 mg Tab, TAKE ONE TABLET BY MOUTH TWICE DAILY AS NEEDED FOR DIARRHEA, Disp: 10 tablet, Rfl: 1    ibuprofen (ADVIL,MOTRIN) 600 MG tablet, Take 1 tablet (600 mg total) by mouth every 8 (eight) hours as needed for Pain., Disp: 42 tablet, Rfl: 0    losartan (COZAAR) 100 MG tablet, TAKE ONE TABLET BY MOUTH EVERY DAY, Disp: 30 tablet, Rfl: 5    metoprolol succinate (TOPROL-XL) 50 MG 24 hr tablet, TAKE ONE TABLET BY MOUTH EVERY DAY, Disp: 30 tablet, Rfl: 5    mirtazapine (REMERON) 15 MG tablet, Take 1 tablet (15 mg total) by mouth every evening., Disp: 30 tablet, Rfl: 12    sertraline (ZOLOFT) 25 MG tablet, TAKE ONE TABLET BY MOUTH EVERY DAY, Disp: 30 tablet, Rfl: 2    traMADoL (ULTRAM) 50 mg tablet, Take 1 tablet (50 mg total) by mouth every 6 (six) hours as needed for Pain., Disp: 20 tablet, Rfl: 0    Medication Reconciliation:  Were medications changed during this appointment? No  Were medications in the home? Yes  Is the patient taking the medications as directed? Yes  Does the patient/caregiver understand the medications and changes? Yes  Does updated med list accurately reflects meds patient is currently taking? Yes    Signature: Sandra Palmer NP

## 2023-11-07 ENCOUNTER — TELEPHONE (OUTPATIENT)
Dept: HOME HEALTH SERVICES | Facility: CLINIC | Age: 88
End: 2023-11-07
Payer: MEDICARE

## 2023-11-07 VITALS — SYSTOLIC BLOOD PRESSURE: 124 MMHG | DIASTOLIC BLOOD PRESSURE: 62 MMHG | HEART RATE: 70 BPM | RESPIRATION RATE: 16 BRPM

## 2023-11-07 PROBLEM — M51.36 DDD (DEGENERATIVE DISC DISEASE), LUMBAR: Status: ACTIVE | Noted: 2023-11-07

## 2023-11-07 NOTE — ASSESSMENT & PLAN NOTE
Chronic  Reducing mobility  Use walker at all times  Declines PT due to age  Reviewed fall precautions.

## 2023-11-07 NOTE — TELEPHONE ENCOUNTER
Faxed orders and demographics to [facility] for Bedside Commode per NP request.  Fax confirmation received.

## 2024-01-07 ENCOUNTER — PATIENT MESSAGE (OUTPATIENT)
Dept: HOME HEALTH SERVICES | Facility: CLINIC | Age: 89
End: 2024-01-07
Payer: MEDICARE

## 2024-01-09 ENCOUNTER — CARE AT HOME (OUTPATIENT)
Dept: HOME HEALTH SERVICES | Facility: CLINIC | Age: 89
End: 2024-01-09
Payer: MEDICARE

## 2024-01-09 DIAGNOSIS — N30.00 ACUTE CYSTITIS WITHOUT HEMATURIA: Primary | ICD-10-CM

## 2024-01-09 PROCEDURE — 99442 PR PHYSICIAN TELEPHONE EVALUATION 11-20 MIN: CPT | Mod: 95,,, | Performed by: NURSE PRACTITIONER

## 2024-01-09 RX ORDER — NITROFURANTOIN 25; 75 MG/1; MG/1
100 CAPSULE ORAL 2 TIMES DAILY
Qty: 14 CAPSULE | Refills: 0 | Status: SHIPPED | OUTPATIENT
Start: 2024-01-09 | End: 2024-01-16

## 2024-01-09 NOTE — PROGRESS NOTES
Established Patient - Audio Only Telehealth Visit with [facility] Provider: Spencer     The patient location is: HOME  The chief complaint leading to consultation is: routine care  Visit type: Virtual visit with audio only (telephone)     The reason for the audio only service rather than synchronous audio and video virtual visit was related to technical difficulties or patient preference/necessity.     Each patient to whom I provide medical services by telemedicine is:  (1) informed of the relationship between the physician and patient and the respective role of any other health care provider with respect to management of the patient; and (2) notified that they may decline to receive medical services by telemedicine and may withdraw from such care at any time. Patient verbally consented to receive this service via voice-only telephone call.       Subjective:      Patient ID: Audrey Lama is a 89 y.o. female.    Prior to this visit, patient's last encounter with PCP was Visit date not found.    Pt's daughter main complaint today is dysuria, increased confusion and agitation.  Daughter reports mother complained yesterday of burning when she urinated. Today, more confused and agitated. Will not talk about burning today.   Mother refusing to go to clinic, hospital or to collect specimen.  Family interested in comfort and avoiding visits      Medications: Does not have pill packs  Daughter handles medications, pt is compliant will meds, Med list is correct      4Ms for Medical Decision-Making in Older Adults    Last Completed EAWV: 2021    MOBILITY:  Get Up and Go:      2021    10:23 AM   Get Up and Go   Trial 1 19 seconds     Activities of Daily Livin/11/2021    10:25 AM   Activities of Daily Living   Ambulation Assistance Required   Ambulation Assistance Walker (wheeled)   Dressing Independent   Transfers Independent   Toileting Continent of bowel;Continent of bladder   Feeding Independent    Cleaning home/Chores Assistance Required   Telephone use Independent   Shopping Assistance Required   Paying bills Assistance Required   Taking meds Assistance Required     Whisper Test:      2021    10:23 AM   Whisper Test   Whisper Test N/A- Hearing Impairment     Disability Status:      2023    12:25 PM   Disability Status   Are you deaf or do you have serious difficulty hearing? N   Are you blind or do you have serious difficulty seeing, even when wearing glasses? N   Because of a physical, mental, or emotional condition, do you have serious difficulty concentrating, remembering, or making decisions? N   Do you have serious difficulty walking or climbing stairs? N   Do you have difficulty dressing or bathing? N   Because of a physical, mental, or emotional condition, do you have difficulty doing errands alone such as visiting a doctor's office or shopping? N     Nutrition Screenin/11/2021    10:25 AM   Nutrition Screening   Has food intake declined over the past three months due to loss of appetite, digestive problems, chewing or swallowing difficulties? No decrease in food intake   Involuntary weight loss during the last 3 months? No weight loss   Mobility? Able to get out of bed/chair, but does not go out   Has the patient suffered psychological stress or acute disease in the past three months? No   Neuropsychological problems? No psychological problems   Body Mass Index (BMI)?  BMI 23 or greater   Screening Score 13    Screening Score: 0-7 Malnourished, 8-11 At Risk, 12-14 Normal    MENTATION:   Depression Patient Health Questionnaire:      2021    10:22 AM   Depression Patient Health Questionnaire   Over the last two weeks how often have you been bothered by little interest or pleasure in doing things Several days   Over the last two weeks how often have you been bothered by feeling down, depressed or hopeless Not at all   PHQ-2 Total Score 1     Has Dementia Dx: No    Cognitive  Function Screenin/11/2021    10:27 AM   Cognitive Function Screening   Clock Drawing Test 1   Mini-Cog 3 Minute Recall 1   Cognitive Function Screening 2     Cognitive Function Screening Total - Less than 4 = Abnormal,  Greater than or equal to 4 = Normal    MEDICATIONS:  High Risk Medications:  Total Active Medications: 2  sertraline - 25 MG  traMADoL - 50 mg    WHAT MATTERS MOST:  Advance Care Planning   ACP Status:   Patient has had an ACP conversation  Living Will: No  Power of : No  [place]POST: No    What is most important right now is to focus on avoiding the hospital    Accordingly, we have decided that the best plan to meet the patient's goals includes pivot to comfort-focused care      What matters most to patient today is: avoiding hospital and clinic visits             Social History     Socioeconomic History    Marital status:    Tobacco Use    Smoking status: Never    Smokeless tobacco: Never   Substance and Sexual Activity    Alcohol use: Not Currently    Drug use: Never    Sexual activity: Not Currently     Social Determinants of Health     Financial Resource Strain: Low Risk  (2021)    Overall Financial Resource Strain (CARDIA)     Difficulty of Paying Living Expenses: Not very hard   Food Insecurity: No Food Insecurity (2021)    Hunger Vital Sign     Worried About Running Out of Food in the Last Year: Never true     Ran Out of Food in the Last Year: Never true   Transportation Needs: No Transportation Needs (2021)    PRAPARE - Transportation     Lack of Transportation (Medical): No     Lack of Transportation (Non-Medical): No   Physical Activity: Inactive (2021)    Exercise Vital Sign     Days of Exercise per Week: 0 days     Minutes of Exercise per Session: 0 min   Stress: No Stress Concern Present (2021)    [employer] [place] [employer] - Occupational Stress Questionnaire     Feeling of Stress : Only a little   Social Connections: Moderately  Isolated (5/11/2021)    Social Connection and Isolation Panel [NHANES]     Frequency of Communication with Friends and Family: More than three times a week     Frequency of Social Gatherings with Friends and Family: Twice a week     Attends Moravian Services: 1 to 4 times per year     Active Member of Clubs or Organizations: No     Attends Club or Organization Meetings: Never     Marital Status:    Housing Stability: Low Risk  (5/11/2021)    Housing Stability Vital Sign     Unable to Pay for Housing in the Last Year: No     Number of Places Lived in the Last Year: 1     Unstable Housing in the Last Year: No       Review of Systems    Objective:     Lab Results   Component Value Date    WBC 11.59 01/30/2023    HGB 11.3 (L) 01/30/2023    HCT 34.7 (L) 01/30/2023     01/30/2023    CHOL 224 (H) 10/11/2018    TRIG 201 (H) 10/11/2018    HDL 44 10/11/2018    ALT 17 02/26/2019    AST 19 02/26/2019     01/30/2023    K 4.4 01/30/2023     (H) 01/30/2023    CREATININE 0.9 01/30/2023    BUN 21 01/30/2023    CO2 23 01/30/2023    TSH 1.407 06/29/2016    INR 1.0 04/15/2010    HGBA1C 5.6 02/26/2019         Assessment:   89 y.o. female with multiple co-morbid illnesses here to continue work-up of chronic issues.     Plan:     Problem List Items Addressed This Visit          Renal/    Acute cystitis without hematuria - Primary     Increase water intake  Macrobid to pharmacy  Monitor         Relevant Medications    nitrofurantoin, macrocrystal-monohydrate, (MACROBID) 100 MG capsule       Health Maintenance         Date Due Completion Date    COVID-19 Vaccine (1) Never done ---    Shingles Vaccine (1 of 2) Never done ---    RSV Vaccine (Age 60+ and Pregnant patients) (1 - 1-dose 60+ series) Never done ---    Diabetes Urine Screening 04/23/2010 4/23/2009    DEXA Scan 04/15/2016 4/15/2013    Override on 7/24/2012: Not Clinically Appropriate    Hemoglobin A1c 02/26/2020 2/26/2019    Influenza Vaccine (1)  09/01/2023 11/17/2022    Lipid Panel 10/11/2023 10/11/2018    TETANUS VACCINE 04/25/2025 4/25/2015            No follow-ups on file. . Thirty minutes spent with this patient today in a non-face to face encounter.    Sandra Palmer, ANPJoséc, APRN, MSN  [facility]      This service was not originating from a related E/M service provided within the previous 7 days nor will  to an E/M service or procedure within the next 24 hours or my soonest available appointment.  Prevailing standard of care was able to be met in this audio-only visit.                         This service was not originating from a related E/M service provided within the previous 7 days nor will  to an E/M service or procedure within the next 24 hours or my soonest available appointment.  Prevailing standard of care was able to be met in this audio-only visit.

## 2024-01-15 DIAGNOSIS — I10 PRIMARY HYPERTENSION: ICD-10-CM

## 2024-01-15 DIAGNOSIS — F33.0 MAJOR DEPRESSIVE DISORDER, RECURRENT, MILD: ICD-10-CM

## 2024-01-15 RX ORDER — METOPROLOL SUCCINATE 50 MG/1
50 TABLET, EXTENDED RELEASE ORAL DAILY
Qty: 30 TABLET | Refills: 5 | Status: SHIPPED | OUTPATIENT
Start: 2024-01-15 | End: 2024-07-13

## 2024-01-15 RX ORDER — MIRTAZAPINE 15 MG/1
15 TABLET, FILM COATED ORAL NIGHTLY
Qty: 30 TABLET | Refills: 12 | Status: SHIPPED | OUTPATIENT
Start: 2024-01-15

## 2024-01-15 RX ORDER — AMLODIPINE BESYLATE 10 MG/1
10 TABLET ORAL DAILY
Qty: 30 TABLET | Refills: 5 | Status: SHIPPED | OUTPATIENT
Start: 2024-01-15 | End: 2024-07-13

## 2024-01-15 RX ORDER — SERTRALINE HYDROCHLORIDE 25 MG/1
25 TABLET, FILM COATED ORAL DAILY
Qty: 30 TABLET | Refills: 5 | Status: SHIPPED | OUTPATIENT
Start: 2024-01-15 | End: 2024-07-13

## 2024-03-27 ENCOUNTER — CARE AT HOME (OUTPATIENT)
Dept: HOME HEALTH SERVICES | Facility: CLINIC | Age: 89
End: 2024-03-27
Payer: MEDICARE

## 2024-03-27 DIAGNOSIS — K02.9 DENTAL CARIES: ICD-10-CM

## 2024-03-27 DIAGNOSIS — I10 PRIMARY HYPERTENSION: ICD-10-CM

## 2024-03-27 DIAGNOSIS — H35.30 MACULAR DEGENERATION, UNSPECIFIED LATERALITY, UNSPECIFIED TYPE: Primary | ICD-10-CM

## 2024-03-27 PROCEDURE — 99441 PR PHYSICIAN TELEPHONE EVALUATION 5-10 MIN: CPT | Mod: 95,,, | Performed by: NURSE PRACTITIONER

## 2024-04-01 PROBLEM — K02.9 DENTAL CARIES: Status: ACTIVE | Noted: 2024-04-01

## 2024-04-01 NOTE — PROGRESS NOTES
Established Patient - Audio Only Telehealth Visit     The patient location is: [place], [place]  The chief complaint leading to consultation is: tooth problem  Visit type: Virtual visit with audio only (telephone)  Total time spent with patient: 15 min       The reason for the audio only service rather than synchronous audio and video virtual visit was related to technical difficulties or patient preference/necessity.     Each patient to whom I provide medical services by telemedicine is:  (1) informed of the relationship between the physician and patient and the respective role of any other health care provider with respect to management of the patient; and (2) notified that they may decline to receive medical services by telemedicine and may withdraw from such care at any time. Patient verbally consented to receive this service via voice-only telephone call.       HPI:     Past Medical History:   Diagnosis Date    DDD (degenerative disc disease), lumbar 11/7/2023    Diabetes mellitus     Hypertension     Macular degeneration     Squamous cell carcinoma      DaughterZoya is on the call today regarding her mother. She is in her usual state of health. No recent falls. Does report her vision is worsening. She is compliant with her medications, appetite is good. See problem list     Assessment and plan:       Problem List Items Addressed This Visit          Ophtho    Macular degeneration - Both Eyes - Primary    Current Assessment & Plan     Daughter reports vision is worsening  Mostly blind now  Fall precautions              ENT    Dental caries    Current Assessment & Plan     Daughter reports filling has fallen out.  Pt denies any pain, but daughter notices her picking at it and believes food may get stuck.  Previous dentist no longer able to see pt.   Reviewed local dentis with daughter and she will call for an appt.  Will notify if any further assistance is needed            Cardiac/Vascular    Hypertension    Current  Assessment & Plan     Compliant with meds  Continue current plan  Monitor            - Continue all medications, treatments and therapies as ordered.   - Follow all instructions, recommendations as discussed.  - Maintain Safety Precautions at all times.  - Attend all medical appointments as scheduled.  - For worsening symptoms: call Primary Care Physician or Nurse Practitioner.  - For emergencies, call 911 or immediately report to the nearest emergency room.      Outpatient Medications as of 3/27/2024   Medication Sig Dispense Refill    acetaminophen (TYLENOL) 500 MG tablet Take 2 tablets (1,000 mg total) by mouth every 12 (twelve) hours as needed for Pain. 56 tablet 0    amLODIPine (NORVASC) 10 MG tablet Take 1 tablet (10 mg total) by mouth once daily. 30 tablet 5    ANTI-DIARRHEAL, LOPERAMIDE, 2 mg Tab TAKE ONE TABLET BY MOUTH TWICE DAILY AS NEEDED FOR DIARRHEA 10 tablet 1    ibuprofen (ADVIL,MOTRIN) 600 MG tablet Take 1 tablet (600 mg total) by mouth every 8 (eight) hours as needed for Pain. 42 tablet 0    losartan (COZAAR) 100 MG tablet TAKE ONE TABLET BY MOUTH EVERY DAY 30 tablet 5    metoprolol succinate (TOPROL-XL) 50 MG 24 hr tablet Take 1 tablet (50 mg total) by mouth once daily. 30 tablet 5    mirtazapine (REMERON) 15 MG tablet Take 1 tablet (15 mg total) by mouth every evening. 30 tablet 12    sertraline (ZOLOFT) 25 MG tablet Take 1 tablet (25 mg total) by mouth once daily. 30 tablet 5    traMADoL (ULTRAM) 50 mg tablet Take 1 tablet (50 mg total) by mouth every 6 (six) hours as needed for Pain. 20 tablet 0     No current facility-administered medications on file as of 3/27/2024.                        This service was not originating from a related E/M service provided within the previous 7 days nor will  to an E/M service or procedure within the next 24 hours or my soonest available appointment.  Prevailing standard of care was able to be met in this audio-only visit.

## 2024-04-01 NOTE — ASSESSMENT & PLAN NOTE
Daughter reports filling has fallen out.  Pt denies any pain, but daughter notices her picking at it and believes food may get stuck.  Previous dentist no longer able to see pt.   Reviewed local dentis with daughter and she will call for an appt.  Will notify if any further assistance is needed

## 2024-04-12 ENCOUNTER — TELEPHONE (OUTPATIENT)
Dept: HOME HEALTH SERVICES | Facility: CLINIC | Age: 89
End: 2024-04-12
Payer: MEDICARE

## 2024-04-12 NOTE — TELEPHONE ENCOUNTER
Returned daughter's call regarding next NP visit with Sandra Palmer NP.  Notified her that her mother has an appointment scheduled with Sandra on 5/16.  Per CG Patient has no immediate needs at this time and that appointment scheduled is fine. No other issues needed to be addressed at time of call.

## 2024-05-16 ENCOUNTER — CARE AT HOME (OUTPATIENT)
Dept: HOME HEALTH SERVICES | Facility: CLINIC | Age: 89
End: 2024-05-16
Payer: MEDICARE

## 2024-05-16 DIAGNOSIS — I70.0 CALCIFICATION OF AORTA: ICD-10-CM

## 2024-05-16 DIAGNOSIS — C44.622 SQUAMOUS CELL CANCER OF SKIN OF FINGER, RIGHT: Primary | ICD-10-CM

## 2024-05-16 DIAGNOSIS — F33.0 MAJOR DEPRESSIVE DISORDER, RECURRENT, MILD: ICD-10-CM

## 2024-05-16 DIAGNOSIS — N18.30 CONTROLLED TYPE 2 DIABETES MELLITUS WITH STAGE 3 CHRONIC KIDNEY DISEASE, WITHOUT LONG-TERM CURRENT USE OF INSULIN: ICD-10-CM

## 2024-05-16 DIAGNOSIS — E11.22 CONTROLLED TYPE 2 DIABETES MELLITUS WITH STAGE 3 CHRONIC KIDNEY DISEASE, WITHOUT LONG-TERM CURRENT USE OF INSULIN: ICD-10-CM

## 2024-05-16 PROCEDURE — 99348 HOME/RES VST EST LOW MDM 30: CPT | Mod: S$GLB,,, | Performed by: NURSE PRACTITIONER

## 2024-05-16 NOTE — PROGRESS NOTES
[facility]  Medical Chronic Care Home Visit    Encounter Provider: Sandra Palmer   PCP: Sandra Palmer, NP  Consult Requested By: No ref. provider found    HISTORY OF PRESENT ILLNESS      Patient ID: Audrey Lama is a 89 y.o. female is being seen in the home due to physical debility that presents a taxing effort to leave the home, to mitigate high risk of hospital readmission and/or due to the limited availability of reliable or safe options for transportation to the point of access to the provider. Prior to treatment on this visit the chart was reviewed and patient verbal consent was obtained.    Chronic medical conditions synopsis:  Pt is being seen today for routine follow ups  Denies any new problems today  Compliant with meds and good appetite  Pt does not want to leave the home, reports she will die in her home. Wants to stay in her room most of the time.  Palliative care in place        DECISION MAKING TODAY       Assessment & Plan:  1. 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  Dispense: 24 packet; Refill: 2    2. Controlled type 2 diabetes mellitus with stage 3 chronic kidney disease, without long-term current use of insulin  Assessment & Plan:  Stable  No current meds  Diet controlled  ADA diet  Monitor      3. Major depressive disorder, recurrent, mild  Assessment & Plan:  Chronic and stable  Pt with some anxiety at times  No thoughts of harm  Improved with sertraline and remeron  Continue current plan and meds  Notify for any worsening symptoms  Monitor        4. Calcification of aorta  Overview:  CXR 9/15/11    Assessment & Plan:  Noted on imaging  Statin deferred to age  Monitor             ENVIRONMENT OF  CARE      Family and/or Caregiver present at visit?  Yes  Name of Caregiver:   History provided by: patient    4Ms for Medical Decision-Making in Older Adults    Last Completed EAWV: 2021    MOBILITY:  Get Up and Go:      2021    10:23 AM   Get Up and Go   Trial 1 19 seconds     Activities of Daily Livin/11/2021    10:25 AM   Activities of Daily Living   Ambulation Assistance Required   Ambulation Assistance Walker (wheeled)   Dressing Independent   Transfers Independent   Toileting Continent of bowel;Continent of bladder   Feeding Independent   Cleaning home/Chores Assistance Required   Telephone use Independent   Shopping Assistance Required   Paying bills Assistance Required   Taking meds Assistance Required     Whisper Test:      2021    10:23 AM   Whisper Test   Whisper Test N/A- Hearing Impairment     Disability Status:      2023    12:25 PM   Disability Status   Are you deaf or do you have serious difficulty hearing? N   Are you blind or do you have serious difficulty seeing, even when wearing glasses? N   Because of a physical, mental, or emotional condition, do you have serious difficulty concentrating, remembering, or making decisions? N   Do you have serious difficulty walking or climbing stairs? N   Do you have difficulty dressing or bathing? N   Because of a physical, mental, or emotional condition, do you have difficulty doing errands alone such as visiting a doctor's office or shopping? N     Nutrition Screenin/11/2021    10:25 AM   Nutrition Screening   Has food intake declined over the past three months due to loss of appetite, digestive problems, chewing or swallowing difficulties? No decrease in food intake   Involuntary weight loss during the last 3 months? No weight loss   Mobility? Able to get out of bed/chair, but does not go out   Has the patient suffered psychological stress or acute disease in the past three months? No   Neuropsychological problems? No  psychological problems   Body Mass Index (BMI)?  BMI 23 or greater   Screening Score 13    Screening Score: 0-7 Malnourished, 8-11 At Risk, 12-14 Normal    MENTATION:   Depression Patient Health Questionnaire:      2021    10:22 AM   Depression Patient Health Questionnaire   Over the last two weeks how often have you been bothered by little interest or pleasure in doing things Several days   Over the last two weeks how often have you been bothered by feeling down, depressed or hopeless Not at all   PHQ-2 Total Score 1     Has Dementia Dx: No    Cognitive Function Screenin/11/2021    10:27 AM   Cognitive Function Screening   Clock Drawing Test 1   Mini-Cog 3 Minute Recall 1   Cognitive Function Screening 2     Cognitive Function Screening Total - Less than 4 = Abnormal,  Greater than or equal to 4 = Normal    MEDICATIONS:  High Risk Medications:  Total Active Medications: 2  sertraline - 25 MG  traMADoL - 50 mg    WHAT MATTERS MOST:  Advance Care Planning   ACP Status:   Patient has had an ACP conversation  Living Will: No  Power of : No  [place]: No    What is most important right now is to focus on avoiding the hospital    Accordingly, we have decided that the best plan to meet the patient's goals includes pivot to comfort-focused care      What matters most to patient today is: remaining in her home       {Is patient hospice appropriate: No  (If needed, use PPS <30 or FAST score >7)  Was referral to hospice placed: No    Impression upon entering the home:  Physical Dwelling: single family home   Appearance of home environment: cleaniness: clean  Functional Status: minimal assistance  Mobility: ambulatory with device  Nutritional access: adequate intake and access  Home Health: No, and does not need it at this time   DME/Supplies: rolling walker       Does patient have a PCP at [facility]? Yes   Repatriation plan with PCP? [facility] at Home reason: mobility   Does patient have an ostomy (ileostomy,  colostomy, suprapubic catheter, nephrostomy tube, tracheostomy, PEG tube, pleurex catheter, cholecystostomy, etc)? No  Were BPAs reviewed? Yes    Social History     Socioeconomic History    Marital status:    Tobacco Use    Smoking status: Never    Smokeless tobacco: Never   Substance and Sexual Activity    Alcohol use: Not Currently    Drug use: Never    Sexual activity: Not Currently     Social Determinants of Health     Financial Resource Strain: Low Risk  (5/11/2021)    Overall Financial Resource Strain (CARDIA)     Difficulty of Paying Living Expenses: Not very hard   Food Insecurity: No Food Insecurity (5/11/2021)    Hunger Vital Sign     Worried About Running Out of Food in the Last Year: Never true     Ran Out of Food in the Last Year: Never true   Transportation Needs: No Transportation Needs (5/11/2021)    PRAPARE - Transportation     Lack of Transportation (Medical): No     Lack of Transportation (Non-Medical): No   Physical Activity: Inactive (5/11/2021)    Exercise Vital Sign     Days of Exercise per Week: 0 days     Minutes of Exercise per Session: 0 min   Stress: No Stress Concern Present (5/11/2021)    [employer] [place] - Occupational Stress Questionnaire     Feeling of Stress : Only a little   Housing Stability: Low Risk  (5/11/2021)    Housing Stability Vital Sign     Unable to Pay for Housing in the Last Year: No     Number of Places Lived in the Last Year: 1     Unstable Housing in the Last Year: No         OBJECTIVE:     Vital Signs:  Vitals:    05/16/24 1300   BP: 124/68   Pulse: 72   Resp: 16       Review of Systems   Constitutional:  Negative for activity change, fatigue and fever.   HENT:  Positive for hearing loss.    Eyes:  Positive for visual disturbance.   Respiratory:  Negative for chest tightness.    Cardiovascular: Negative.  Negative for leg swelling.   Gastrointestinal:  Positive for constipation.   Endocrine: Negative.    Musculoskeletal:  Positive for  gait problem.   Skin:         growths   Psychiatric/Behavioral: Negative.  Negative for agitation.    All other systems reviewed and are negative.      Physical Exam:  Physical Exam  Vitals reviewed.   Constitutional:       General: She is not in acute distress.     Appearance: She is well-developed.   HENT:      Head: Normocephalic and atraumatic.      Nose: Nose normal.      Mouth/Throat:      Mouth: Mucous membranes are dry.   Eyes:      Pupils: Pupils are equal, round, and reactive to light.   Cardiovascular:      Rate and Rhythm: Normal rate and regular rhythm.      Heart sounds: Normal heart sounds.   Pulmonary:      Effort: Pulmonary effort is normal.      Breath sounds: Normal breath sounds.   Abdominal:      General: Bowel sounds are normal.      Palpations: Abdomen is soft.   Musculoskeletal:         General: Normal range of motion.      Cervical back: Normal range of motion and neck supple.   Skin:     General: Skin is warm and dry.      Findings: Lesion (multiple areas of actinic keratosis, skin cancers) present.   Neurological:      Mental Status: She is alert. Mental status is at baseline.      Cranial Nerves: No cranial nerve deficit.   Psychiatric:         Behavior: Behavior normal.         INSTRUCTIONS FOR PATIENT:     Scheduled Follow-up, Appts Reviewed with Modifications if Needed: Yes  No future appointments.      Current Outpatient Medications:     acetaminophen (TYLENOL) 500 MG tablet, Take 2 tablets (1,000 mg total) by mouth every 12 (twelve) hours as needed for Pain., Disp: 56 tablet, Rfl: 0    amLODIPine (NORVASC) 10 MG tablet, Take 1 tablet (10 mg total) by mouth once daily., Disp: 30 tablet, Rfl: 5    ANTI-DIARRHEAL, LOPERAMIDE, 2 mg Tab, TAKE ONE TABLET BY MOUTH TWICE DAILY AS NEEDED FOR DIARRHEA, Disp: 10 tablet, Rfl: 1    ibuprofen (ADVIL,MOTRIN) 600 MG tablet, Take 1 tablet (600 mg total) by mouth every 8 (eight) hours as needed for Pain., Disp: 42 tablet, Rfl: 0    imiquimod (ALDARA)  5 % cream, Apply topically 3 (three) times a week., Disp: 24 packet, Rfl: 2    losartan (COZAAR) 100 MG tablet, TAKE ONE TABLET BY MOUTH EVERY DAY, Disp: 30 tablet, Rfl: 5    metoprolol succinate (TOPROL-XL) 50 MG 24 hr tablet, Take 1 tablet (50 mg total) by mouth once daily., Disp: 30 tablet, Rfl: 5    mirtazapine (REMERON) 15 MG tablet, Take 1 tablet (15 mg total) by mouth every evening., Disp: 30 tablet, Rfl: 12    sertraline (ZOLOFT) 25 MG tablet, Take 1 tablet (25 mg total) by mouth once daily., Disp: 30 tablet, Rfl: 5    traMADoL (ULTRAM) 50 mg tablet, Take 1 tablet (50 mg total) by mouth every 6 (six) hours as needed for Pain., Disp: 20 tablet, Rfl: 0    Medication Reconciliation:  Were medications changed during this appointment? No  Were medications in the home? Yes  Is the patient taking the medications as directed? Yes  Does the patient/caregiver understand the medications and changes? Yes  Does updated med list accurately reflects meds patient is currently taking? Yes    This visit includes 30 min face to face time and non-face to face time preparing to see the patient (eg, review of tests), obtaining and/or reviewing separately obtained history, documenting clinical information in the electronic or other health record, independently interpreting results and communicating results to the patient/family/caregiver, or care coordinator.        Signature: Sandra Palmer NP

## 2024-05-17 VITALS
RESPIRATION RATE: 16 BRPM | OXYGEN SATURATION: 97 % | SYSTOLIC BLOOD PRESSURE: 124 MMHG | HEART RATE: 72 BPM | DIASTOLIC BLOOD PRESSURE: 68 MMHG

## 2024-05-17 RX ORDER — IMIQUIMOD 12.5 MG/.25G
CREAM TOPICAL
Qty: 24 PACKET | Refills: 2 | Status: SHIPPED | OUTPATIENT
Start: 2024-05-17 | End: 2024-07-16

## 2024-05-17 NOTE — ASSESSMENT & PLAN NOTE
Excised by derm   Pt declined further surgery  Growth returning to finger  Pt also with actinic keratosis to skin  Pt does not want procedures  Imiquiod cream 3x week

## 2024-06-27 ENCOUNTER — PATIENT MESSAGE (OUTPATIENT)
Dept: HOME HEALTH SERVICES | Facility: CLINIC | Age: 89
End: 2024-06-27
Payer: MEDICARE

## 2024-06-30 ENCOUNTER — PATIENT MESSAGE (OUTPATIENT)
Dept: HOME HEALTH SERVICES | Facility: CLINIC | Age: 89
End: 2024-06-30
Payer: MEDICARE

## 2024-07-02 ENCOUNTER — PATIENT MESSAGE (OUTPATIENT)
Dept: HOME HEALTH SERVICES | Facility: CLINIC | Age: 89
End: 2024-07-02
Payer: MEDICARE

## 2024-07-03 ENCOUNTER — PATIENT MESSAGE (OUTPATIENT)
Dept: INTERNAL MEDICINE | Facility: CLINIC | Age: 89
End: 2024-07-03
Payer: MEDICARE

## 2024-07-03 ENCOUNTER — PATIENT MESSAGE (OUTPATIENT)
Dept: HOME HEALTH SERVICES | Facility: CLINIC | Age: 89
End: 2024-07-03

## 2024-07-03 ENCOUNTER — CARE AT HOME (OUTPATIENT)
Dept: HOME HEALTH SERVICES | Facility: CLINIC | Age: 89
End: 2024-07-03
Payer: MEDICARE

## 2024-07-03 ENCOUNTER — E-CONSULT (OUTPATIENT)
Dept: DERMATOLOGY | Facility: CLINIC | Age: 89
End: 2024-07-03

## 2024-07-03 VITALS — RESPIRATION RATE: 16 BRPM | HEART RATE: 77 BPM | TEMPERATURE: 98 F | OXYGEN SATURATION: 98 %

## 2024-07-03 DIAGNOSIS — E44.0 MODERATE PROTEIN-CALORIE MALNUTRITION: ICD-10-CM

## 2024-07-03 DIAGNOSIS — L57.0 ACTINIC KERATOSES: ICD-10-CM

## 2024-07-03 DIAGNOSIS — L98.9 SKIN LESION OF FACE: Primary | ICD-10-CM

## 2024-07-03 DIAGNOSIS — L82.1 SEBORRHEIC KERATOSES: ICD-10-CM

## 2024-07-03 DIAGNOSIS — L01.00 IMPETIGO: Primary | ICD-10-CM

## 2024-07-03 DIAGNOSIS — Z51.5 PALLIATIVE CARE PATIENT: ICD-10-CM

## 2024-07-03 DIAGNOSIS — L89.211 PRESSURE INJURY OF RIGHT HIP, STAGE 1: ICD-10-CM

## 2024-07-03 RX ORDER — DOXYCYCLINE 100 MG/1
100 CAPSULE ORAL EVERY 12 HOURS
Qty: 14 CAPSULE | Refills: 0 | Status: SHIPPED | OUTPATIENT
Start: 2024-07-03 | End: 2024-07-10

## 2024-07-03 NOTE — CONSULTS
[facility]  Response for E-Consult     Patient Name: Audrey Lama  MRN: [id number]  Primary Care Provider: Sandra Palmer NP   Requesting Provider: Sandra Palmer NP  E-Consult to Dermatology  Consult performed by: Daphne Rojo MD  Consult ordered by: Sandra Palmer NP  Reason for consult: Rash  Assessment/Recommendations: Thank you for this consult. I favor she has an infection beneath the plaque of actinic keratoses and seborrheic keratoses. I recommend treating with doxycycline as you did.     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           Recommendation: as above     Contingency that warrants a repeat eConsult or referral: Reconsult if the condition worsens or fails to improve       Total time of Consultation: 10 minute    I did not speak to the requesting provider verbally about this.     *This eConsult is based on the clinical data available to me and is furnished without benefit of a physical examination. The eConsult will need to be interpreted in light of any clinical issues or changes in patient status not available to me at the time of filing this eConsults. Significant changes in patient condition or level of acuity should result in immediate formal consultation and reevaluation. Please alert me if you have further questions.    Thank you for this eConsult referral.     Daphne Rojo MD  [facility]

## 2024-07-03 NOTE — ASSESSMENT & PLAN NOTE
Daughter would like to consider hospice  Discussed end of life goals and advanced care planning with pt and/or family.   Reviewed living will, [place], and pt's wishes regarding life support and tube feeding.  Reviewed pt's code status and prognosis.   Reviewed hospice program and services to pt and/or family.  Will send hospice referral for educ visit  30 minutes spent in discussion of these services.

## 2024-07-03 NOTE — PROGRESS NOTES
[facility]  Medical Chronic Care Home Visit    Encounter Provider: Sandra Palmer   PCP: Sandra Palmer, NP  Consult Requested By: No ref. provider found    HISTORY OF PRESENT ILLNESS      Patient ID: Audrey Lama is a 89 y.o. female is being seen in the home due to physical debility that presents a taxing effort to leave the home, to mitigate high risk of hospital readmission and/or due to the limited availability of reliable or safe options for transportation to the point of access to the provider. Prior to treatment on this visit the chart was reviewed and patient verbal consent was obtained.    Chronic medical conditions synopsis:  Pt is being seen today for evaluation of rash to face  Daughter reports rash is becoming warm to touch, redder and occ draining. She has history of skin cancers and daughter has been using Aldara cream to skin lesions    She also has a pressure wound to her right hip        DECISION MAKING TODAY       Assessment & Plan:  1. Skin lesion of face  Assessment & Plan:  See photo  Red and warm some drainage  Cancer vs cellulitis  Doxycycline to pharmacy  Hold Aldara cream  E-consult to derm    Orders:  -     E-Consult to Dermatology    2. Pressure injury of right hip, stage 1  Assessment & Plan:  Small nickel sized ulcer to hip  Reposition frequently  Use duoderm to wound      3. Moderate protein-calorie malnutrition  Assessment & Plan:  Appetite decreasing  Losing wt  Bony prominences noted, clothes loose  CMP ordered.  Small more frequent offerings    Orders:  -     COMPREHENSIVE METABOLIC PANEL; Future; Expected date: 07/03/2024  -     CBC Auto Differential; Future; Expected date: 07/03/2024    4. Palliative care patient  Overview:  Pt is DNR    Assessment & Plan:  Daughter would like to consider hospice  Discussed end of life goals and advanced care planning with pt and/or family.   Reviewed living will, [place], and pt's wishes regarding life support and tube  feeding.  Reviewed pt's code status and prognosis.   Reviewed hospice program and services to pt and/or family.  Will send hospice referral for educ visit  30 minutes spent in discussion of these services.        Other orders  -     doxycycline (VIBRAMYCIN) 100 MG Cap; Take 1 capsule (100 mg total) by mouth every 12 (twelve) hours. for 7 days  Dispense: 14 capsule; Refill: 0             ENVIRONMENT OF CARE      Family and/or Caregiver present at visit?  Yes  Name of Caregiver:   History provided by: patient    4Ms for Medical Decision-Making in Older Adults    Last Completed EAWV: 2021    MOBILITY:  Get Up and Go:      2021    10:23 AM   Get Up and Go   Trial 1 19 seconds     Activities of Daily Livin/11/2021    10:25 AM   Activities of Daily Living   Ambulation Assistance Required   Ambulation Assistance Walker (wheeled)   Dressing Independent   Transfers Independent   Toileting Continent of bowel;Continent of bladder   Feeding Independent   Cleaning home/Chores Assistance Required   Telephone use Independent   Shopping Assistance Required   Paying bills Assistance Required   Taking meds Assistance Required     Whisper Test:      2021    10:23 AM   Whisper Test   Whisper Test N/A- Hearing Impairment     Disability Status:      2023    12:25 PM   Disability Status   Are you deaf or do you have serious difficulty hearing? N   Are you blind or do you have serious difficulty seeing, even when wearing glasses? N   Because of a physical, mental, or emotional condition, do you have serious difficulty concentrating, remembering, or making decisions? N   Do you have serious difficulty walking or climbing stairs? N   Do you have difficulty dressing or bathing? N   Because of a physical, mental, or emotional condition, do you have difficulty doing errands alone such as visiting a doctor's office or shopping? N     Nutrition Screenin/11/2021    10:25 AM   Nutrition Screening   Has food  intake declined over the past three months due to loss of appetite, digestive problems, chewing or swallowing difficulties? No decrease in food intake   Involuntary weight loss during the last 3 months? No weight loss   Mobility? Able to get out of bed/chair, but does not go out   Has the patient suffered psychological stress or acute disease in the past three months? No   Neuropsychological problems? No psychological problems   Body Mass Index (BMI)?  BMI 23 or greater   Screening Score 13    Screening Score: 0-7 Malnourished, 8-11 At Risk, 12-14 Normal    MENTATION:   Depression Patient Health Questionnaire:      2021    10:22 AM   Depression Patient Health Questionnaire   Over the last two weeks how often have you been bothered by little interest or pleasure in doing things Several days   Over the last two weeks how often have you been bothered by feeling down, depressed or hopeless Not at all   PHQ-2 Total Score 1     Has Dementia Dx: No    Cognitive Function Screenin/11/2021    10:27 AM   Cognitive Function Screening   Clock Drawing Test 1   Mini-Cog 3 Minute Recall 1   Cognitive Function Screening 2     Cognitive Function Screening Total - Less than 4 = Abnormal,  Greater than or equal to 4 = Normal    MEDICATIONS:  High Risk Medications:  Total Active Medications: 2  sertraline - 25 MG  traMADoL - 50 mg    WHAT MATTERS MOST:  Advance Care Planning   ACP Status:   Patient has had an ACP conversation  Living Will: No  Power of : No  [place]: No    What is most important right now is to focus on avoiding the hospital    Accordingly, we have decided that the best plan to meet the patient's goals includes pivot to comfort-focused care      What matters most to patient today is: remaining in her home       {Is patient hospice appropriate: No  (If needed, use PPS <30 or FAST score >7)  Was referral to hospice placed: No    Impression upon entering the home:  Physical Dwelling: single family home    Appearance of home environment: cleaniness: clean  Functional Status: minimal assistance  Mobility: ambulatory with device  Nutritional access: adequate intake and access  Home Health: No, and does not need it at this time   DME/Supplies: rolling walker       Does patient have a PCP at [facility]? Yes   Repatriation plan with PCP? [facility] at Home reason: mobility   Does patient have an ostomy (ileostomy, colostomy, suprapubic catheter, nephrostomy tube, tracheostomy, PEG tube, pleurex catheter, cholecystostomy, etc)? No  Were BPAs reviewed? Yes    Social History     Socioeconomic History    Marital status:    Tobacco Use    Smoking status: Never    Smokeless tobacco: Never   Substance and Sexual Activity    Alcohol use: Not Currently    Drug use: Never    Sexual activity: Not Currently     Social Determinants of Health     Financial Resource Strain: Low Risk  (5/11/2021)    Overall Financial Resource Strain (CARDIA)     Difficulty of Paying Living Expenses: Not very hard   Food Insecurity: No Food Insecurity (5/11/2021)    Hunger Vital Sign     Worried About Running Out of Food in the Last Year: Never true     Ran Out of Food in the Last Year: Never true   Transportation Needs: No Transportation Needs (5/11/2021)    PRAPARE - Transportation     Lack of Transportation (Medical): No     Lack of Transportation (Non-Medical): No   Physical Activity: Inactive (5/11/2021)    Exercise Vital Sign     Days of Exercise per Week: 0 days     Minutes of Exercise per Session: 0 min   Stress: No Stress Concern Present (5/11/2021)    [employer] - Occupational Stress Questionnaire     Feeling of Stress : Only a little   Housing Stability: Low Risk  (5/11/2021)    Housing Stability Vital Sign     Unable to Pay for Housing in the Last Year: No     Number of Places Lived in the Last Year: 1     Unstable Housing in the Last Year: No         OBJECTIVE:     Vital Signs:  Vitals:    07/03/24 1345   Pulse: 77    Resp: 16   Temp: 97.7 °F (36.5 °C)         Review of Systems   Constitutional:  Negative for activity change, fatigue and fever.   HENT:  Positive for hearing loss.    Eyes:  Positive for visual disturbance.   Respiratory:  Negative for chest tightness.    Cardiovascular: Negative.  Negative for leg swelling.   Gastrointestinal:  Positive for constipation.   Endocrine: Negative.    Musculoskeletal:  Positive for gait problem.   Skin:         growths   Psychiatric/Behavioral: Negative.  Negative for agitation.    All other systems reviewed and are negative.      Physical Exam:  Physical Exam  Vitals reviewed.   Constitutional:       General: She is not in acute distress.     Appearance: She is well-developed.   HENT:      Head: Normocephalic and atraumatic.      Nose: Nose normal.      Mouth/Throat:      Mouth: Mucous membranes are dry.   Eyes:      Pupils: Pupils are equal, round, and reactive to light.   Cardiovascular:      Rate and Rhythm: Normal rate and regular rhythm.      Heart sounds: Normal heart sounds.   Pulmonary:      Effort: Pulmonary effort is normal.      Breath sounds: Normal breath sounds.   Abdominal:      General: Bowel sounds are normal.      Palpations: Abdomen is soft.   Musculoskeletal:         General: Normal range of motion.      Cervical back: Normal range of motion and neck supple.   Skin:     General: Skin is warm and dry.      Findings: Lesion (multiple areas of actinic keratosis, skin cancers) present.      Comments: See photo of facial lesion   Neurological:      Mental Status: She is alert. Mental status is at baseline.      Cranial Nerves: No cranial nerve deficit.   Psychiatric:         Behavior: Behavior normal.         INSTRUCTIONS FOR PATIENT:     Scheduled Follow-up, Appts Reviewed with Modifications if Needed: Yes  No future appointments.      Current Outpatient Medications:     acetaminophen (TYLENOL) 500 MG tablet, Take 2 tablets (1,000 mg total) by mouth every 12 (twelve)  hours as needed for Pain., Disp: 56 tablet, Rfl: 0    amLODIPine (NORVASC) 10 MG tablet, Take 1 tablet (10 mg total) by mouth once daily., Disp: 30 tablet, Rfl: 5    ANTI-DIARRHEAL, LOPERAMIDE, 2 mg Tab, TAKE ONE TABLET BY MOUTH TWICE DAILY AS NEEDED FOR DIARRHEA, Disp: 10 tablet, Rfl: 1    doxycycline (VIBRAMYCIN) 100 MG Cap, Take 1 capsule (100 mg total) by mouth every 12 (twelve) hours. for 7 days, Disp: 14 capsule, Rfl: 0    ibuprofen (ADVIL,MOTRIN) 600 MG tablet, Take 1 tablet (600 mg total) by mouth every 8 (eight) hours as needed for Pain., Disp: 42 tablet, Rfl: 0    imiquimod (ALDARA) 5 % cream, Apply topically 3 (three) times a week., Disp: 24 packet, Rfl: 2    losartan (COZAAR) 100 MG tablet, TAKE ONE TABLET BY MOUTH EVERY DAY, Disp: 30 tablet, Rfl: 5    metoprolol succinate (TOPROL-XL) 50 MG 24 hr tablet, Take 1 tablet (50 mg total) by mouth once daily., Disp: 30 tablet, Rfl: 5    mirtazapine (REMERON) 15 MG tablet, Take 1 tablet (15 mg total) by mouth every evening., Disp: 30 tablet, Rfl: 12    sertraline (ZOLOFT) 25 MG tablet, Take 1 tablet (25 mg total) by mouth once daily., Disp: 30 tablet, Rfl: 5    traMADoL (ULTRAM) 50 mg tablet, Take 1 tablet (50 mg total) by mouth every 6 (six) hours as needed for Pain., Disp: 20 tablet, Rfl: 0    Medication Reconciliation:  Were medications changed during this appointment? No  Were medications in the home? Yes  Is the patient taking the medications as directed? Yes  Does the patient/caregiver understand the medications and changes? Yes  Does updated med list accurately reflects meds patient is currently taking? Yes    This visit includes 30 min face to face time and non-face to face time preparing to see the patient (eg, review of tests), obtaining and/or reviewing separately obtained history, documenting clinical information in the electronic or other health record, independently interpreting results and communicating results to the patient/family/caregiver, or  care coordinator.        Signature: Sandra Palmer NP

## 2024-07-03 NOTE — ASSESSMENT & PLAN NOTE
See photo  Red and warm some drainage  Cancer vs cellulitis  Doxycycline to pharmacy  Hold Aldara cream  E-consult to derm

## 2024-07-03 NOTE — ASSESSMENT & PLAN NOTE
Appetite decreasing  Losing wt  Bony prominences noted, clothes loose  CMP ordered.  Small more frequent offerings

## 2024-07-05 ENCOUNTER — TELEPHONE (OUTPATIENT)
Dept: HOME HEALTH SERVICES | Facility: CLINIC | Age: 89
End: 2024-07-05
Payer: MEDICARE

## 2024-07-05 DIAGNOSIS — E11.22 CONTROLLED TYPE 2 DIABETES MELLITUS WITH STAGE 3 CHRONIC KIDNEY DISEASE, WITHOUT LONG-TERM CURRENT USE OF INSULIN: Primary | ICD-10-CM

## 2024-07-05 DIAGNOSIS — N18.30 CONTROLLED TYPE 2 DIABETES MELLITUS WITH STAGE 3 CHRONIC KIDNEY DISEASE, WITHOUT LONG-TERM CURRENT USE OF INSULIN: Primary | ICD-10-CM

## 2024-07-05 NOTE — TELEPHONE ENCOUNTER
Faxed orders and demographics to [facility] for labs (CMP and CBC) per NP request.  Fax confirmation received.  Attempt was made to call patient's caregiver per NP request for information on the dermatology e-consult result stating  that the dermatologist agrees with NP that she has an infection under psoarsis plaques. After the antibiotics are completed if she is still having issues then he recommends a steroid cream and the need for labs to be drawn.  Spoke with daughterZoya.  Verbalized understanding.

## 2024-07-08 ENCOUNTER — LAB VISIT (OUTPATIENT)
Dept: LAB | Facility: HOSPITAL | Age: 89
End: 2024-07-08
Attending: NURSE PRACTITIONER
Payer: MEDICARE

## 2024-07-08 DIAGNOSIS — E44.0 MODERATE PROTEIN-CALORIE MALNUTRITION: ICD-10-CM

## 2024-07-08 LAB
ALBUMIN SERPL BCP-MCNC: 3.6 G/DL (ref 3.5–5.2)
ALP SERPL-CCNC: 87 U/L (ref 38–126)
ALT SERPL W/O P-5'-P-CCNC: 17 U/L (ref 10–44)
ANION GAP SERPL CALC-SCNC: 9 MMOL/L (ref 8–16)
AST SERPL-CCNC: 27 U/L (ref 15–46)
BASOPHILS # BLD AUTO: 0.02 K/UL (ref 0–0.2)
BASOPHILS NFR BLD: 0.3 % (ref 0–1.9)
BILIRUB SERPL-MCNC: 0.5 MG/DL (ref 0.1–1)
CALCIUM SERPL-MCNC: 9.2 MG/DL (ref 8.7–10.5)
CHLORIDE SERPL-SCNC: 109 MMOL/L (ref 95–110)
CO2 SERPL-SCNC: 24 MMOL/L (ref 23–29)
CREAT SERPL-MCNC: 0.73 MG/DL (ref 0.5–1.4)
DIFFERENTIAL METHOD BLD: ABNORMAL
EOSINOPHIL # BLD AUTO: 0.1 K/UL (ref 0–0.5)
EOSINOPHIL NFR BLD: 0.8 % (ref 0–8)
ERYTHROCYTE [DISTWIDTH] IN BLOOD BY AUTOMATED COUNT: 14.8 % (ref 11.5–14.5)
EST. GFR  (NO RACE VARIABLE): >60 ML/MIN/1.73 M^2
GLUCOSE SERPL-MCNC: 90 MG/DL (ref 70–110)
HCT VFR BLD AUTO: 39.3 % (ref 37–48.5)
HGB BLD-MCNC: 12.7 G/DL (ref 12–16)
IMM GRANULOCYTES # BLD AUTO: 0.09 K/UL (ref 0–0.04)
IMM GRANULOCYTES NFR BLD AUTO: 1.2 % (ref 0–0.5)
LYMPHOCYTES # BLD AUTO: 2.8 K/UL (ref 1–4.8)
LYMPHOCYTES NFR BLD: 36.7 % (ref 18–48)
MCH RBC QN AUTO: 28 PG (ref 27–31)
MCHC RBC AUTO-ENTMCNC: 32.3 G/DL (ref 32–36)
MCV RBC AUTO: 87 FL (ref 82–98)
MONOCYTES # BLD AUTO: 0.6 K/UL (ref 0.3–1)
MONOCYTES NFR BLD: 7.2 % (ref 4–15)
NEUTROPHILS # BLD AUTO: 4.2 K/UL (ref 1.8–7.7)
NEUTROPHILS NFR BLD: 53.8 % (ref 38–73)
NRBC BLD-RTO: 0 /100 WBC
PLATELET # BLD AUTO: 180 K/UL (ref 150–450)
PMV BLD AUTO: 11.5 FL (ref 9.2–12.9)
POTASSIUM SERPL-SCNC: 4.3 MMOL/L (ref 3.5–5.1)
PROT SERPL-MCNC: 6.8 G/DL (ref 6–8.4)
RBC # BLD AUTO: 4.54 M/UL (ref 4–5.4)
SODIUM SERPL-SCNC: 142 MMOL/L (ref 136–145)
UUN UR-MCNC: 18 MG/DL (ref 7–17)
WBC # BLD AUTO: 7.74 K/UL (ref 3.9–12.7)

## 2024-07-08 PROCEDURE — 85025 COMPLETE CBC W/AUTO DIFF WBC: CPT | Mod: PN | Performed by: NURSE PRACTITIONER

## 2024-07-08 PROCEDURE — 80053 COMPREHEN METABOLIC PANEL: CPT | Mod: PN | Performed by: NURSE PRACTITIONER

## 2024-07-09 ENCOUNTER — PATIENT MESSAGE (OUTPATIENT)
Dept: HOME HEALTH SERVICES | Facility: CLINIC | Age: 89
End: 2024-07-09
Payer: MEDICARE

## 2024-07-12 ENCOUNTER — PATIENT MESSAGE (OUTPATIENT)
Dept: HOME HEALTH SERVICES | Facility: CLINIC | Age: 89
End: 2024-07-12
Payer: MEDICARE

## 2024-07-15 ENCOUNTER — PATIENT MESSAGE (OUTPATIENT)
Dept: HOME HEALTH SERVICES | Facility: CLINIC | Age: 89
End: 2024-07-15
Payer: MEDICARE

## 2024-07-22 DIAGNOSIS — F33.0 MAJOR DEPRESSIVE DISORDER, RECURRENT, MILD: ICD-10-CM

## 2024-07-22 DIAGNOSIS — I10 PRIMARY HYPERTENSION: ICD-10-CM

## 2024-07-22 DIAGNOSIS — E44.0 MODERATE PROTEIN-CALORIE MALNUTRITION: Primary | ICD-10-CM

## 2024-07-22 RX ORDER — METOPROLOL SUCCINATE 50 MG/1
50 TABLET, EXTENDED RELEASE ORAL DAILY
Qty: 30 TABLET | Refills: 5 | Status: SHIPPED | OUTPATIENT
Start: 2024-07-22 | End: 2025-01-18

## 2024-07-22 RX ORDER — SERTRALINE HYDROCHLORIDE 25 MG/1
25 TABLET, FILM COATED ORAL DAILY
Qty: 30 TABLET | Refills: 5 | Status: SHIPPED | OUTPATIENT
Start: 2024-07-22 | End: 2025-01-18

## 2024-07-22 RX ORDER — MIRTAZAPINE 15 MG/1
15 TABLET, FILM COATED ORAL NIGHTLY
Qty: 30 TABLET | Refills: 12 | Status: SHIPPED | OUTPATIENT
Start: 2024-07-22

## 2024-07-22 RX ORDER — AMLODIPINE BESYLATE 10 MG/1
10 TABLET ORAL DAILY
Qty: 30 TABLET | Refills: 5 | Status: SHIPPED | OUTPATIENT
Start: 2024-07-22 | End: 2025-01-18

## 2024-07-22 NOTE — PROGRESS NOTES
Returned daughter's call.  Refills to pharmacy per request.  Cellulitis to face has resolved  Daughter would like to have an informational visit from hospice  Orders sent to [facility]

## 2024-07-24 ENCOUNTER — TELEPHONE (OUTPATIENT)
Dept: HOME HEALTH SERVICES | Facility: CLINIC | Age: 89
End: 2024-07-24
Payer: MEDICARE

## 2024-07-24 NOTE — TELEPHONE ENCOUNTER
Faxed orders, notes, and demographics to [facility] for Hospice services per NP request.  Fax confirmation received.

## 2025-02-01 NOTE — TELEPHONE ENCOUNTER
Spoke to pt daughter and she stated she need to speak to nursing home on how to facilitate the virtual call. Will call back to confirm. Pt is active in [employer].    KILEY    Ms. Villa

## (undated) DEVICE — GLOVE BIOGEL ECLIPSE SZ 7

## (undated) DEVICE — SLING ARM MEDIUM FOAM STRAP

## (undated) DEVICE — TOURNIQUET SB QC DP 18X4IN

## (undated) DEVICE — SPONGE COTTON TRAY 4X4IN

## (undated) DEVICE — GLOVE BIOGEL PI MICRO INDIC 7

## (undated) DEVICE — DRAPE STERI-DRAPE 1000 17X11IN

## (undated) DEVICE — SOL PVP-I SCRUB 7.5% 4OZ

## (undated) DEVICE — SUT 4-0 CHROMIC GUT / P-3

## (undated) DEVICE — BANDAGE MATRIX HK LOOP 2IN 5YD

## (undated) DEVICE — CORD BIPOLAR 12 FOOT

## (undated) DEVICE — FORCEP STRAIGHT DISP

## (undated) DEVICE — DRESSING N ADH OIL EMUL 3X3

## (undated) DEVICE — SUT 5-0 VICRYL / P-3 (J493)

## (undated) DEVICE — SUT ETHILON 4-0 P-3 BLK 18IN

## (undated) DEVICE — PAD UNDERPAD 30X30

## (undated) DEVICE — PACK UPPER EXTREMITY BAPTIST

## (undated) DEVICE — PAD CAST 2 IN X 4YDS STERILE

## (undated) DEVICE — GAUZE DERMACEA LOW PLY 3X4YRDS